# Patient Record
Sex: MALE | Race: WHITE | NOT HISPANIC OR LATINO | Employment: OTHER | URBAN - METROPOLITAN AREA
[De-identification: names, ages, dates, MRNs, and addresses within clinical notes are randomized per-mention and may not be internally consistent; named-entity substitution may affect disease eponyms.]

---

## 2018-03-22 ENCOUNTER — TRANSFERRED RECORDS (OUTPATIENT)
Dept: MULTI SPECIALTY CLINIC | Facility: CLINIC | Age: 63
End: 2018-03-22

## 2020-02-07 ENCOUNTER — OFFICE VISIT (OUTPATIENT)
Dept: FAMILY MEDICINE | Facility: CLINIC | Age: 65
End: 2020-02-07
Payer: COMMERCIAL

## 2020-02-07 ENCOUNTER — ANCILLARY PROCEDURE (OUTPATIENT)
Dept: GENERAL RADIOLOGY | Facility: CLINIC | Age: 65
End: 2020-02-07
Attending: PHYSICIAN ASSISTANT
Payer: COMMERCIAL

## 2020-02-07 VITALS
RESPIRATION RATE: 18 BRPM | DIASTOLIC BLOOD PRESSURE: 82 MMHG | HEART RATE: 64 BPM | TEMPERATURE: 97.2 F | BODY MASS INDEX: 26.09 KG/M2 | SYSTOLIC BLOOD PRESSURE: 128 MMHG | HEIGHT: 67 IN | WEIGHT: 166.2 LBS

## 2020-02-07 DIAGNOSIS — Z00.01 ENCOUNTER FOR ROUTINE ADULT HEALTH EXAMINATION WITH ABNORMAL FINDINGS: ICD-10-CM

## 2020-02-07 DIAGNOSIS — L30.8 OTHER ECZEMA: ICD-10-CM

## 2020-02-07 DIAGNOSIS — Z87.891 HISTORY OF TOBACCO ABUSE: ICD-10-CM

## 2020-02-07 DIAGNOSIS — Z23 NEED FOR VACCINATION: ICD-10-CM

## 2020-02-07 DIAGNOSIS — M25.512 CHRONIC LEFT SHOULDER PAIN: ICD-10-CM

## 2020-02-07 DIAGNOSIS — G89.29 CHRONIC LEFT SHOULDER PAIN: ICD-10-CM

## 2020-02-07 DIAGNOSIS — I25.10 CORONARY ARTERY DISEASE INVOLVING NATIVE CORONARY ARTERY OF NATIVE HEART WITHOUT ANGINA PECTORIS: ICD-10-CM

## 2020-02-07 DIAGNOSIS — Z12.11 SPECIAL SCREENING FOR MALIGNANT NEOPLASMS, COLON: Primary | ICD-10-CM

## 2020-02-07 PROBLEM — L57.0 ACTINIC KERATOSIS: Status: ACTIVE | Noted: 2020-02-07

## 2020-02-07 PROCEDURE — 90750 HZV VACC RECOMBINANT IM: CPT | Performed by: PHYSICIAN ASSISTANT

## 2020-02-07 PROCEDURE — 73030 X-RAY EXAM OF SHOULDER: CPT | Mod: LT

## 2020-02-07 PROCEDURE — 99387 INIT PM E/M NEW PAT 65+ YRS: CPT | Mod: 25 | Performed by: PHYSICIAN ASSISTANT

## 2020-02-07 PROCEDURE — 90471 IMMUNIZATION ADMIN: CPT | Performed by: PHYSICIAN ASSISTANT

## 2020-02-07 RX ORDER — ATORVASTATIN CALCIUM 40 MG/1
40 TABLET, FILM COATED ORAL
COMMUNITY
Start: 2016-07-01 | End: 2020-08-14

## 2020-02-07 RX ORDER — TRIAMCINOLONE ACETONIDE 1 MG/G
CREAM TOPICAL 2 TIMES DAILY
Qty: 85.2 G | Refills: 1 | Status: SHIPPED | OUTPATIENT
Start: 2020-02-07 | End: 2020-07-29

## 2020-02-07 RX ORDER — ASPIRIN 81 MG/1
81 TABLET, CHEWABLE ORAL
COMMUNITY
Start: 2015-06-13 | End: 2020-08-14

## 2020-02-07 ASSESSMENT — ENCOUNTER SYMPTOMS
HEADACHES: 0
SORE THROAT: 0
FEVER: 0
NERVOUS/ANXIOUS: 0
DIARRHEA: 0
PALPITATIONS: 0
DYSURIA: 0
MYALGIAS: 1
JOINT SWELLING: 0
HEMATOCHEZIA: 0
COUGH: 0
ABDOMINAL PAIN: 0
ARTHRALGIAS: 1
FREQUENCY: 0
EYE PAIN: 0
DIZZINESS: 0
HEARTBURN: 0
HEMATURIA: 0
PARESTHESIAS: 0
NAUSEA: 0
CHILLS: 0
CONSTIPATION: 0

## 2020-02-07 ASSESSMENT — ACTIVITIES OF DAILY LIVING (ADL): CURRENT_FUNCTION: NO ASSISTANCE NEEDED

## 2020-02-07 ASSESSMENT — MIFFLIN-ST. JEOR: SCORE: 1496.47

## 2020-02-07 NOTE — NURSING NOTE
"Chief Complaint   Patient presents with     Physical       Initial /82 (BP Location: Right arm, Patient Position: Sitting, Cuff Size: Adult Regular)   Pulse 64   Temp 97.2  F (36.2  C) (Tympanic)   Resp 18   Ht 1.708 m (5' 7.25\")   Wt 75.4 kg (166 lb 3.2 oz)   BMI 25.84 kg/m   Estimated body mass index is 25.84 kg/m  as calculated from the following:    Height as of this encounter: 1.708 m (5' 7.25\").    Weight as of this encounter: 75.4 kg (166 lb 3.2 oz).    Patient presents to the clinic using No DME    Health Maintenance that is potentially due pending provider review:  Colonoscopy/FIT    Gave pt phone number/pended order to schedule mammo and/or colonoscopy(or FIT)    Is there anyone who you would like to be able to receive your results? No  If yes have patient fill out RJ    Shari Chaney CMA    "

## 2020-02-07 NOTE — PROGRESS NOTES
"SUBJECTIVE:   Dillon Matias is a 65 year old male who presents for Preventive Visit.    Physical Health:    In general, how would you rate your overall physical health? good    Outside of work, how many days during the week do you exercise? 4-5 days/week    Outside of work, approximately how many minutes a day do you exercise?less than 15 minutes    If you drink alcohol do you typically have >3 drinks per day or >7 drinks per week? No    Do you usually eat at least 4 servings of fruit and vegetables a day, include whole grains & fiber and avoid regularly eating high fat or \"junk\" foods? NO    Do you have any problems taking medications regularly?  No    Do you have any side effects from medications? No    Needs assistance for the following daily activities: no assistance needed    Which of the following safety concerns are present in your home?  none identified     Hearing impairment: No    In the past 6 months, have you been bothered by leaking of urine? no    Mental Health:    In general, how would you rate your overall mental or emotional health? good  PHQ-2 Score: 0    Do you feel safe in your environment? Yes    Have you ever done Advance Care Planning? (For example, a Health Directive, POLST, or a discussion with a medical provider or your loved ones about your wishes): Yes, patient states has an Advance Care Planning document and will bring a copy to the clinic.    Additional concerns to address?  No    Fall risk:  Fallen 2 or more times in the past year?: No  Any fall with injury in the past year?: No     Cognitive Screenin) Repeat 3 items (Leader, Season, Table)   2) Clock draw: NORMAL  3) 3 item recall: Recalls 3 objects  Results: 3 items recalled: COGNITIVE IMPAIRMENT LESS LIKELY    Mini-CogTM Copyright SRIDHAR Mckenzie. Licensed by the author for use in [facility]; reprinted with permission (ean@.[facility]). All rights reserved.      Do you have sleep apnea, excessive snoring or daytime " drowsiness?: yes    Vascular Disease Follow-up - June 2015 with [facility]      How often do you take nitroglycerin? Never    Do you take an aspirin every day? Yes      Reviewed and updated as needed this visit by clinical staff  Tobacco  Allergies  Meds  Problems  Med Hx  Surg Hx  Fam Hx  Soc Hx          Reviewed and updated as needed this visit by Provider  Tobacco  Allergies  Meds  Problems  Med Hx  Surg Hx  Fam Hx        Social History     Tobacco Use     Smoking status: Former Smoker     Smokeless tobacco: Never Used   Substance Use Topics     Alcohol use: Not on file                           Current providers sharing in care for this patient include:   Transferring care to NB team  Patient Care Team:  Shree Saunders MD as PCP - General    The following health maintenance items are reviewed in Epic and correct as of today:  Health Maintenance   Topic Date Due     HEPATITIS C SCREENING  1955     COLONOSCOPY  01/21/1964     LIPID  01/21/1989     AORTIC ANEURYSM SCREENING (SYSTEM ASSIGNED)  01/21/2019     MEDICARE ANNUAL WELLNESS VISIT  02/07/2021     FALL RISK ASSESSMENT  02/07/2021     DTAP/TDAP/TD IMMUNIZATION (3 - Td) 03/18/2023     ADVANCE CARE PLANNING  02/07/2025     PHQ-2  Completed     INFLUENZA VACCINE  Completed     PNEUMOCOCCAL IMMUNIZATION 65+ LOW/MEDIUM RISK  Completed     ZOSTER IMMUNIZATION  Completed     IPV IMMUNIZATION  Aged Out     MENINGITIS IMMUNIZATION  Aged Out     Lab work is in process  Labs reviewed in EPIC  Patient Active Problem List   Diagnosis     Coronary artery disease involving native coronary artery of native heart without angina pectoris     Dyslipidemia     Actinic keratosis     Chronic left shoulder pain     History of tobacco abuse     Other eczema     History reviewed. No pertinent surgical history.    Social History     Tobacco Use     Smoking status: Former Smoker     Smokeless tobacco: Never Used   Substance Use Topics     Alcohol use: Not on file      "History reviewed. No pertinent family history.      Current Outpatient Medications   Medication Sig Dispense Refill     aspirin (ASA) 81 MG chewable tablet 81 mg by Nasogastric route       atorvastatin (LIPITOR) 40 MG tablet Take 40 mg by mouth       triamcinolone (KENALOG) 0.1 % external cream Apply topically 2 times daily 85.2 g 1     No Known Allergies  Pneumonia Vaccine: UTD  Shingles - 2nd IZ given today.     ROS:  Constitutional, HEENT, cardiovascular, pulmonary, gi and gu systems are negative, except as otherwise noted. Mild right shoulder pain    OBJECTIVE:   /82 (BP Location: Right arm, Patient Position: Sitting, Cuff Size: Adult Regular)   Pulse 64   Temp 97.2  F (36.2  C) (Tympanic)   Resp 18   Ht 1.708 m (5' 7.25\")   Wt 75.4 kg (166 lb 3.2 oz)   BMI 25.84 kg/m   Estimated body mass index is 25.84 kg/m  as calculated from the following:    Height as of this encounter: 1.708 m (5' 7.25\").    Weight as of this encounter: 75.4 kg (166 lb 3.2 oz).  EXAM:   GENERAL: healthy, alert and no distress  EYES: Eyes grossly normal to inspection, PERRL and conjunctivae and sclerae normal  HENT: Multiple rough scaly patches on the face consistent with actinic keratosis.  Ear canals and TM's normal, nose and mouth without ulcers or lesions  NECK: no adenopathy, no asymmetry, masses, or scars and thyroid normal to palpation  RESP: lungs clear to auscultation - no rales, rhonchi or wheezes  CV: regular rate and rhythm, normal S1 S2, no S3 or S4, no murmur, click or rub, no peripheral edema and peripheral pulses strong  ABDOMEN: soft, nontender, no hepatosplenomegaly, no masses and bowel sounds normal  MS: no gross musculoskeletal defects noted, no edema  MS: extremities normal- no gross deformities noted  SKIN: no suspicious lesions or rashes  NEURO: Normal strength and tone, mentation intact and speech normal  PSYCH: mentation appears normal, affect normal/bright     Diagnostic Test Results:  Labs reviewed in " "Epic      ASSESSMENT / PLAN:   (Z00.01) Encounter for routine adult health examination with abnormal findings  Comment: Overall very healthy. Few actinic keratoses on exam. Will schedule appt for cryotherapy. Updated screenings, see below  Plan:     (Z12.11) Special screening for malignant neoplasms, colon  (primary encounter diagnosis)  Comment: Thinks he had a colonoscopy in 2017, but isn't sure. Will bring in information if he can find it. If not, order for Colonoscopy placed.   Plan: GASTROENTEROLOGY ADULT REF PROCEDURE ONLY [facility] [phone]          (Z87.891) History of tobacco abuse  Comment: Pt with > 30 pack year history. AAA screening ordered  Plan: US Abdominal Aorta Imaging          (I25.10) Coronary artery disease involving native coronary artery of native heart without angina pectoris  Comment: Stable on medical management. No changes today.   Plan: Continue current regimen. Sees Cardiology yearly.     (L30.8) Other eczema  Comment: Chronic. Try triamcinolone  Plan: triamcinolone (KENALOG) 0.1 % external cream          (M25.512,  G89.29) Chronic left shoulder pain  Comment: Pain for several years. F/u for further evaluation. XR today to discuss at follow up  Plan: XR Shoulder Left G/E 3 Views          (Z23) Need for vaccination  Comment: Updated Singrix #1.   Plan: ZOSTER VACCINE RECOMBINANT ADJUVANTED IM NJX         COUNSELING:  Reviewed preventive health counseling, as reflected in patient instructions  Special attention given to:       Consider AAA screening for ages 65-75 and smoking history       Healthy diet/nutrition       Vision screening       Hearing screening       Immunizations    Vaccinated for: Zoster             Colon cancer screening    Estimated body mass index is 25.84 kg/m  as calculated from the following:    Height as of this encounter: 1.708 m (5' 7.25\").    Weight as of this encounter: 75.4 kg (166 lb 3.2 oz).       reports that he has quit smoking. He has " "never used smokeless tobacco.      Appropriate preventive services were discussed with this patient, including applicable screening as appropriate for cardiovascular disease, diabetes, osteopenia/osteoporosis, and glaucoma.  As appropriate for age/gender, discussed screening for colorectal cancer, prostate cancer, breast cancer, and cervical cancer. Checklist reviewing preventive services available has been given to the patient.    Reviewed patients plan of care and provided an AVS. The Basic Care Plan (routine screening as documented in Health Maintenance) for Dillon meets the Care Plan requirement. This Care Plan has been established and reviewed with the Patient.    Counseling Resources:  ATP IV Guidelines  Pooled Cohorts Equation Calculator  Breast Cancer Risk Calculator  FRAX Risk Assessment  ICSI Preventive Guidelines  Dietary Guidelines for Americans, 2010  [employer]'s MyPlate  ASA Prophylaxis  Lung CA Screening    Antonio Hernandez PA-C  [facility]  Answers for HPI/ROS submitted by the patient on 2/7/2020   Annual Exam:  In general, how would you rate your overall physical health?: good  Frequency of exercise:: 4-5 days/week  Do you usually eat at least 4 servings of fruit and vegetables a day, include whole grains & fiber, and avoid regularly eating high fat or \"junk\" foods? : No  Taking medications regularly:: Yes  Medication side effects:: None  Activities of Daily Living: no assistance needed  Home safety: no safety concerns identified  Hearing Impairment:: no hearing concerns  In the past 6 months, have you been bothered by leaking of urine?: No  abdominal pain: No  Blood in stool: No  Blood in urine: No  chest pain: No  chills: No  congestion: Yes  constipation: No  cough: No  diarrhea: No  dizziness: No  ear pain: No  eye pain: No  nervous/anxious: No  fever: No  frequency: No  genital sores: No  headaches: No  hearing loss: No  heartburn: No  arthralgias: Yes  joint swelling: " No  peripheral edema: No  mood changes: No  myalgias: Yes  nausea: No  dysuria: No  palpitations: No  Skin sensation changes: No  sore throat: No  urgency: No  In general, how would you rate your overall mental or emotional health?: excellent  Additional concerns today:: No  Duration of exercise:: Less than 15 minutes

## 2020-02-07 NOTE — PROGRESS NOTES
"SUBJECTIVE:   Dillon Matias is a 65 year old male who presents for Preventive Visit.    Are you in the first 12 months of your Medicare coverage?  No    Healthy Habits:     In general, how would you rate your overall health?  Good    Frequency of exercise:  4-5 days/week    Duration of exercise:  Less than 15 minutes    Do you usually eat at least 4 servings of fruit and vegetables a day, include whole grains    & fiber and avoid regularly eating high fat or \"junk\" foods?  No    Taking medications regularly:  Yes    Medication side effects:  None    Ability to successfully perform activities of daily living:  No assistance needed    Home Safety:  No safety concerns identified    Hearing Impairment:  No hearing concerns    In the past 6 months, have you been bothered by leaking of urine?  No    In general, how would you rate your overall mental or emotional health?  Excellent      PHQ-2 Total Score: 0    Additional concerns today:  No    Do you feel safe in your environment? Yes    Have you ever done Advance Care Planning? (For example, a Health Directive, POLST, or a discussion with a medical provider or your loved ones about your wishes): No, advance care planning information given to patient to review.  Advanced care planning was discussed at today's visit.    {Hearing Test Done (Optional):895331}  Fall risk  Fallen 2 or more times in the past year?: No  Any fall with injury in the past year?: No    Cognitive Screening   1) Repeat 3 items (Leader, Season, Table)    2) Clock draw: NORMAL  3) 3 item recall: Recalls 3 objects  Results: 3 items recalled: COGNITIVE IMPAIRMENT LESS LIKELY    Mini-CogTM Copyright SRIDHAR Mckenzie. Licensed by the author for use in [facility]; reprinted with permission (ean@.[facility]). All rights reserved.      {Do you have sleep apnea, excessive snoring or daytime drowsiness? (Optional):057978}    Reviewed and updated as needed this visit by clinical staff         Reviewed and " updated as needed this visit by Provider        Social History     Tobacco Use     Smoking status: Not on file   Substance Use Topics     Alcohol use: Not on file     If you drink alcohol do you typically have >3 drinks per day or >7 drinks per week? No    Alcohol Use 2/7/2020   Prescreen: >3 drinks/day or >7 drinks/week? No   No flowsheet data found.      Current providers sharing in care for this patient include:   Patient Care Team:  Shree Saunders MD as PCP - General    The following health maintenance items are reviewed in Epic and correct as of today:  Health Maintenance   Topic Date Due     HEPATITIS C SCREENING  1955     ADVANCE CARE PLANNING  1955     COLONOSCOPY  01/21/1964     DTAP/TDAP/TD IMMUNIZATION (1 - Tdap) 01/21/1965     LIPID  01/21/1989     ZOSTER IMMUNIZATION (1 of 2) 01/21/2004     MEDICARE ANNUAL WELLNESS VISIT  01/21/2019     FALL RISK ASSESSMENT  01/21/2019     PNEUMOCOCCAL IMMUNIZATION 65+ LOW/MEDIUM RISK (1 of 2 - PCV13) 01/21/2019     AORTIC ANEURYSM SCREENING (SYSTEM ASSIGNED)  01/21/2019     INFLUENZA VACCINE (1) 09/01/2019     PHQ-2  01/01/2020     IPV IMMUNIZATION  Aged Out     MENINGITIS IMMUNIZATION  Aged Out     {Chronicprobdata (optional):585892}  {Decision Support (Optional):503486}    Review of Systems   Constitutional: Negative for chills and fever.   HENT: Positive for congestion. Negative for ear pain, hearing loss and sore throat.    Eyes: Negative for pain.   Respiratory: Negative for cough.    Cardiovascular: Negative for chest pain, palpitations and peripheral edema.   Gastrointestinal: Negative for abdominal pain, constipation, diarrhea, heartburn, hematochezia and nausea.   Genitourinary: Negative for dysuria, frequency, genital sores, hematuria and urgency.   Musculoskeletal: Positive for arthralgias and myalgias. Negative for joint swelling.   Neurological: Negative for dizziness, headaches and paresthesias.   Psychiatric/Behavioral: Negative for mood  "changes. The patient is not nervous/anxious.      {ROS COMP (Optional):861357}    OBJECTIVE:   /82 (BP Location: Right arm, Patient Position: Sitting, Cuff Size: Adult Regular)   Pulse 64   Temp 97.2  F (36.2  C) (Tympanic)   Resp 18   Ht 1.708 m (5' 7.25\")   Wt 75.4 kg (166 lb 3.2 oz)   BMI 25.84 kg/m   There is no height or weight on file to calculate BMI.  Physical Exam  {Exam (Optional) :826222}    {Diagnostic Test Results (Optional):998446::\"Diagnostic Test Results:\",\"Labs reviewed in Epic\"}    ASSESSMENT / PLAN:   {Dia Picklist:622001}    COUNSELING:  {Medicare Counselin}    Estimated body mass index is 25.84 kg/m  as calculated from the following:    Height as of this encounter: 1.708 m (5' 7.25\").    Weight as of this encounter: 75.4 kg (166 lb 3.2 oz).    {Weight Management Plan (ACO) Complete if BMI is abnormal-  Ages 18-64  BMI >24.9.  Age 65+ with BMI <23 or >30 (Optional):791802}     has no history on file for tobacco.  {Tobacco Cessation -- Complete if patient is a smoker (Optional):781993}    Appropriate preventive services were discussed with this patient, including applicable screening as appropriate for cardiovascular disease, diabetes, osteopenia/osteoporosis, and glaucoma.  As appropriate for age/gender, discussed screening for colorectal cancer, prostate cancer, breast cancer, and cervical cancer. Checklist reviewing preventive services available has been given to the patient.    Reviewed patients plan of care and provided an AVS. The {CarePlan:891231} for Dillon meets the Care Plan requirement. This Care Plan has been established and reviewed with the {PATIENT, FAMILY MEMBER, CAREGIVER:613284}.    Counseling Resources:  ATP IV Guidelines  Pooled Cohorts Equation Calculator  Breast Cancer Risk Calculator  FRAX Risk Assessment  ICSI Preventive Guidelines  Dietary Guidelines for Americans,   USDA's MyPlate  ASA Prophylaxis  Lung CA Screening    Antonio Hernandez, " TAMEKA  [facility]    Identified Health Risks:

## 2020-02-10 ENCOUNTER — OFFICE VISIT (OUTPATIENT)
Dept: FAMILY MEDICINE | Facility: CLINIC | Age: 65
End: 2020-02-10
Payer: COMMERCIAL

## 2020-02-10 VITALS
HEART RATE: 62 BPM | RESPIRATION RATE: 16 BRPM | SYSTOLIC BLOOD PRESSURE: 120 MMHG | DIASTOLIC BLOOD PRESSURE: 78 MMHG | TEMPERATURE: 97.6 F | WEIGHT: 166 LBS | HEIGHT: 67 IN | BODY MASS INDEX: 26.06 KG/M2

## 2020-02-10 DIAGNOSIS — L57.0 ACTINIC KERATOSIS: Primary | ICD-10-CM

## 2020-02-10 PROCEDURE — 17003 DESTRUCT PREMALG LES 2-14: CPT | Performed by: PHYSICIAN ASSISTANT

## 2020-02-10 PROCEDURE — 17000 DESTRUCT PREMALG LESION: CPT | Performed by: PHYSICIAN ASSISTANT

## 2020-02-10 ASSESSMENT — MIFFLIN-ST. JEOR: SCORE: 1495.56

## 2020-02-10 NOTE — NURSING NOTE
"Chief Complaint   Patient presents with     Derm Problem       Initial /78   Pulse 62   Temp 97.6  F (36.4  C) (Tympanic)   Resp 16   Ht 1.708 m (5' 7.25\")   Wt 75.3 kg (166 lb)   BMI 25.81 kg/m   Estimated body mass index is 25.81 kg/m  as calculated from the following:    Height as of this encounter: 1.708 m (5' 7.25\").    Weight as of this encounter: 75.3 kg (166 lb).    Patient presents to the clinic using No DME    Health Maintenance that is potentially due pending provider review:  NONE    n/a    Is there anyone who you would like to be able to receive your results? Yes  If yes have patient fill out RJ    "

## 2020-02-10 NOTE — PROGRESS NOTES
"Subjective     Dillon Matias is a 65 year old male who presents to clinic today for the following health issues:    HPI   Facial and scalp      Duration: months to years    Description (location/character/radiation): raised dry    Intensity:  mild    Accompanying signs and symptoms: none    History (similar episodes/previous evaluation): yes    Precipitating or alleviating factors:     Therapies tried and outcome: has had cryo in the past      Patient Active Problem List   Diagnosis     Coronary artery disease involving native coronary artery of native heart without angina pectoris     Dyslipidemia     Actinic keratosis     Chronic left shoulder pain     History of tobacco abuse     Other eczema     No past surgical history on file.    Social History     Tobacco Use     Smoking status: Former Smoker     Smokeless tobacco: Never Used   Substance Use Topics     Alcohol use: Not on file     No family history on file.      Current Outpatient Medications   Medication Sig Dispense Refill     aspirin (ASA) 81 MG chewable tablet 81 mg by Nasogastric route       atorvastatin (LIPITOR) 40 MG tablet Take 40 mg by mouth       triamcinolone (KENALOG) 0.1 % external cream Apply topically 2 times daily 85.2 g 1     No Known Allergies    Reviewed and updated as needed this visit by Provider         Review of Systems   ROS otherwise negative than what was stated in the HPI.       Objective    /78   Pulse 62   Temp 97.6  F (36.4  C) (Tympanic)   Resp 16   Ht 1.708 m (5' 7.25\")   Wt 75.3 kg (166 lb)   BMI 25.81 kg/m    Body mass index is 25.81 kg/m .  Physical Exam   GENERAL: healthy, alert and no distress  EYES: Eyes grossly normal to inspection, PERRL and conjunctivae and sclerae normal  NECK: no adenopathy, no asymmetry, masses, or scars and thyroid normal to palpation  RESP: No resp distress  SKIN: A total of 8 actinic keratoses on the scalp face and neck were applied cryotherapy in 3 freeze/thaw cycles.    Diagnostic " Test Results:  none         Assessment & Plan     1. Actinic keratosis  8 actinic keratoses were applied cryotherapy on the face, head and neck.  Patient tolerated the procedure well.  Return to clinic as needed for further cryotherapy.  - DESTRUCT BENIGN LESION, UP TO 14     No follow-ups on file.    Antonio Hernandez PA-C  [facility]

## 2020-02-22 ENCOUNTER — ANCILLARY PROCEDURE (OUTPATIENT)
Dept: GENERAL RADIOLOGY | Facility: CLINIC | Age: 65
End: 2020-02-22
Attending: NURSE PRACTITIONER
Payer: COMMERCIAL

## 2020-02-22 ENCOUNTER — OFFICE VISIT (OUTPATIENT)
Dept: URGENT CARE | Facility: URGENT CARE | Age: 65
End: 2020-02-22
Payer: COMMERCIAL

## 2020-02-22 VITALS
OXYGEN SATURATION: 98 % | WEIGHT: 168 LBS | SYSTOLIC BLOOD PRESSURE: 128 MMHG | HEIGHT: 67 IN | HEART RATE: 79 BPM | TEMPERATURE: 100.7 F | RESPIRATION RATE: 16 BRPM | DIASTOLIC BLOOD PRESSURE: 76 MMHG | BODY MASS INDEX: 26.37 KG/M2

## 2020-02-22 DIAGNOSIS — B34.9 VIRAL SYNDROME: Primary | ICD-10-CM

## 2020-02-22 DIAGNOSIS — R05.9 COUGH: ICD-10-CM

## 2020-02-22 DIAGNOSIS — R50.9 FEVER, UNSPECIFIED FEVER CAUSE: ICD-10-CM

## 2020-02-22 LAB
FLUAV+FLUBV AG SPEC QL: NEGATIVE
FLUAV+FLUBV AG SPEC QL: NEGATIVE
SPECIMEN SOURCE: NORMAL

## 2020-02-22 PROCEDURE — 99213 OFFICE O/P EST LOW 20 MIN: CPT | Performed by: NURSE PRACTITIONER

## 2020-02-22 PROCEDURE — 87804 INFLUENZA ASSAY W/OPTIC: CPT | Performed by: NURSE PRACTITIONER

## 2020-02-22 PROCEDURE — 71046 X-RAY EXAM CHEST 2 VIEWS: CPT

## 2020-02-22 ASSESSMENT — MIFFLIN-ST. JEOR: SCORE: 1504.63

## 2020-02-22 NOTE — PROGRESS NOTES
"Subjective     Dillon Matias is a 65 year old male who presents to clinic today for the following health issues:    HPI     Chief Complaint   Patient presents with     Flu     Yesterday afternoon fever, chills, nasal congestion, cough, body aches, headache. Taking Nyquil.         Patient Active Problem List   Diagnosis     Coronary artery disease involving native coronary artery of native heart without angina pectoris     Dyslipidemia     Actinic keratosis     Chronic left shoulder pain     History of tobacco abuse     Other eczema     History reviewed. No pertinent surgical history.    Social History     Tobacco Use     Smoking status: Former Smoker     Smokeless tobacco: Never Used   Substance Use Topics     Alcohol use: Not on file     History reviewed. No pertinent family history.      Current Outpatient Medications   Medication Sig Dispense Refill     aspirin (ASA) 81 MG chewable tablet 81 mg by Nasogastric route       atorvastatin (LIPITOR) 40 MG tablet Take 40 mg by mouth       triamcinolone (KENALOG) 0.1 % external cream Apply topically 2 times daily 85.2 g 1     No Known Allergies      Reviewed and updated as needed this visit by Provider  Tobacco  Allergies  Meds  Problems  Med Hx  Surg Hx  Fam Hx         Review of Systems   ROS COMP: Constitutional, HEENT, cardiovascular, pulmonary, GI, , musculoskeletal, neuro, skin, endocrine and psych systems are negative, except as otherwise noted.      Objective    /76 (BP Location: Right arm, Patient Position: Sitting, Cuff Size: Adult Regular)   Pulse 79   Temp 100.7  F (38.2  C) (Tympanic)   Resp 16   Ht 1.708 m (5' 7.25\")   Wt 76.2 kg (168 lb)   SpO2 98%   BMI 26.12 kg/m    Body mass index is 26.12 kg/m .  Physical Exam   GENERAL: healthy, alert and no distress, nontoxic in appearance  EYES: Eyes grossly normal to inspection, PERRL and conjunctivae and sclerae normal  HENT: ear canals and TM's normal, nose and mouth without ulcers or " lesions  NECK: no adenopathy, supple with full ROM  RESP: lungs clear to auscultation - no rales, rhonchi or wheezes  CV: regular rate and rhythm, normal S1 S2, no S3 or S4, no murmur, click or rub, no peripheral edema   ABDOMEN: soft, nontender, no hepatosplenomegaly, no masses and bowel sounds normal  MS: no gross musculoskeletal defects noted, no edema  No rash    Diagnostic Test Results: I believe cxr is clear but will await over read.    CHEST TWO VIEWS 2/22/2020 1:42 PM      HISTORY: Fever, unspecified fever cause. Cough.     COMPARISON: None.                                                                       IMPRESSION: There are no acute infiltrates. The cardiac silhouette is  not enlarged. Pulmonary vasculature is unremarkable.    Labs reviewed in Epic  Results for orders placed or performed in visit on 02/22/20 (from the past 24 hour(s))   Influenza A/B antigen   Result Value Ref Range    Influenza A/B Agn Specimen Nasopharyngeal     Influenza A Negative NEG^Negative    Influenza B Negative NEG^Negative           Assessment & Plan   Problem List Items Addressed This Visit     None      Visit Diagnoses     Viral syndrome    -  Primary    Fever, unspecified fever cause        Relevant Orders    Influenza A/B antigen (Completed)    XR Chest 2 Views (Completed)    Cough        Relevant Orders    XR Chest 2 Views (Completed)               Patient Instructions   Increase rest and fluids. Tylenol and/or Ibuprofen for comfort. Cool mist vaporizer. If your symptoms worsen or do not resolve follow up with your primary care provider in 1 week and sooner if needed.      Mucinex 600 mg 12 hour formula for ear, head and chest congestion.  It can also thin post nasal drip which can cause a cough and sore throat.    Indications for emergent return to emergency department discussed with patient, who verbalized good understanding and agreement.  Patient understands the limitations of today's evaluation.           Patient  "Education     Viral Syndrome (Adult)  A viral illness may cause a number of symptoms such as fever. Other symptoms depend on the part of the body that the virus affects. If it settles in your nose, throat, and lungs, it may cause cough, sore throat, congestion, runny nose, headache, earache and other ear symptoms, or shortness of breath. If it settles in your stomach and intestinal tract, it may cause nausea, vomiting, cramping, and diarrhea. Sometimes it causes generalized symptoms like \"aching all over,\" feeling tired, loss of energy, or loss of appetite.  A viral illness usually lasts anywhere from several days to several weeks, but sometimes it lasts longer. In some cases, a more serious infection can look like a viral syndrome in the first few days of the illness. You may need another exam and additional tests to know the difference. Watch for the warning signs listed below for when to seek medical advice.  Home care  Follow these guidelines for taking care of yourself at home:    If symptoms are severe, rest at home for the first 2 to 3 days.    Stay away from cigarette smoke - both your smoke and the smoke from others.    You may use over-the-counter acetaminophen or ibuprofen for fever, muscle aching, and headache, unless another medicine was prescribed for this. If you have chronic liver or kidney disease or ever had a stomach ulcer or gastrointestinal bleeding, talk with your healthcare provider before using these medicines. No one who is younger than 18 and ill with a fever should take aspirin. It may cause severe disease or death.    Your appetite may be poor, so a light diet is fine. Avoid dehydration by drinking 8 to 12, 8-ounce glasses of fluids each day. This may include water; orange juice; lemonade; apple, grape, and cranberry juice; clear fruit drinks; electrolyte replacement and sports drinks; and decaffeinated teas and coffee. If you have been diagnosed with a kidney disease, ask your healthcare " provider how much and what types of fluids you should drink to prevent dehydration. If you have kidney disease, drinking too much fluid can cause it build up in the your body and be dangerous to your health.    Over-the-counter remedies won't shorten the length of the illness but may be helpful for symptoms such as cough, sore throat, nasal and sinus congestion, or diarrhea. Don't use decongestants if you have high blood pressure.  Follow-up care  Follow up with your healthcare provider if you do not improve over the next week.  Call 911  Call 911 if any of the following occur:    Convulsion    Feeling weak, dizzy, or like you are going to faint    Chest pain, or more than mild shortness of breath  When to seek medical advice  Call your healthcare provider right away if any of these occur:    Cough with lots of colored sputum (mucus) or blood in your sputum    Chest pain, shortness of breath, wheezing, or trouble breathing    Severe headache; face, neck, or ear pain    Severe, constant pain in the lower right side of your belly (abdominal)    Continued vomiting (can t keep liquids down)    Frequent diarrhea (more than 5 times a day); blood (red or black color) or mucus in diarrhea    Feeling weak, dizzy, or like you are going to faint    Extreme thirst    Fever of 100.4 F (38 C) or higher, or as directed by your healthcare provider  Date Last Reviewed: 4/1/2018 2000-2019 [employer]. [address] [place] [id number]. All rights reserved. This information is not intended as a substitute for professional medical care. Always follow your healthcare professional's instructions.             Return in about 1 week (around 2/29/2020) for Follow up with your primary care provider.    SRIRAM Chung [facility]

## 2020-02-22 NOTE — PATIENT INSTRUCTIONS
"Increase rest and fluids. Tylenol and/or Ibuprofen for comfort. Cool mist vaporizer. If your symptoms worsen or do not resolve follow up with your primary care provider in 1 week and sooner if needed.      Mucinex 600 mg 12 hour formula for ear, head and chest congestion.  It can also thin post nasal drip which can cause a cough and sore throat.    Indications for emergent return to emergency department discussed with patient, who verbalized good understanding and agreement.  Patient understands the limitations of today's evaluation.           Patient Education     Viral Syndrome (Adult)  A viral illness may cause a number of symptoms such as fever. Other symptoms depend on the part of the body that the virus affects. If it settles in your nose, throat, and lungs, it may cause cough, sore throat, congestion, runny nose, headache, earache and other ear symptoms, or shortness of breath. If it settles in your stomach and intestinal tract, it may cause nausea, vomiting, cramping, and diarrhea. Sometimes it causes generalized symptoms like \"aching all over,\" feeling tired, loss of energy, or loss of appetite.  A viral illness usually lasts anywhere from several days to several weeks, but sometimes it lasts longer. In some cases, a more serious infection can look like a viral syndrome in the first few days of the illness. You may need another exam and additional tests to know the difference. Watch for the warning signs listed below for when to seek medical advice.  Home care  Follow these guidelines for taking care of yourself at home:    If symptoms are severe, rest at home for the first 2 to 3 days.    Stay away from cigarette smoke - both your smoke and the smoke from others.    You may use over-the-counter acetaminophen or ibuprofen for fever, muscle aching, and headache, unless another medicine was prescribed for this. If you have chronic liver or kidney disease or ever had a stomach ulcer or gastrointestinal " bleeding, talk with your healthcare provider before using these medicines. No one who is younger than 18 and ill with a fever should take aspirin. It may cause severe disease or death.    Your appetite may be poor, so a light diet is fine. Avoid dehydration by drinking 8 to 12, 8-ounce glasses of fluids each day. This may include water; orange juice; lemonade; apple, grape, and cranberry juice; clear fruit drinks; electrolyte replacement and sports drinks; and decaffeinated teas and coffee. If you have been diagnosed with a kidney disease, ask your healthcare provider how much and what types of fluids you should drink to prevent dehydration. If you have kidney disease, drinking too much fluid can cause it build up in the your body and be dangerous to your health.    Over-the-counter remedies won't shorten the length of the illness but may be helpful for symptoms such as cough, sore throat, nasal and sinus congestion, or diarrhea. Don't use decongestants if you have high blood pressure.  Follow-up care  Follow up with your healthcare provider if you do not improve over the next week.  Call 911  Call 911 if any of the following occur:    Convulsion    Feeling weak, dizzy, or like you are going to faint    Chest pain, or more than mild shortness of breath  When to seek medical advice  Call your healthcare provider right away if any of these occur:    Cough with lots of colored sputum (mucus) or blood in your sputum    Chest pain, shortness of breath, wheezing, or trouble breathing    Severe headache; face, neck, or ear pain    Severe, constant pain in the lower right side of your belly (abdominal)    Continued vomiting (can t keep liquids down)    Frequent diarrhea (more than 5 times a day); blood (red or black color) or mucus in diarrhea    Feeling weak, dizzy, or like you are going to faint    Extreme thirst    Fever of 100.4 F (38 C) or higher, or as directed by your healthcare provider  Date Last Reviewed:  4/1/2018 2000-2019 [employer]. [address] [place] [address], [place] [address]. All rights reserved. This information is not intended as a substitute for professional medical care. Always follow your healthcare professional's instructions.

## 2020-02-27 ENCOUNTER — TELEPHONE (OUTPATIENT)
Dept: FAMILY MEDICINE | Facility: CLINIC | Age: 65
End: 2020-02-27

## 2020-02-27 NOTE — TELEPHONE ENCOUNTER
Spoke with Dillon, his flu like symptoms are gone but this cough is hanging on. Advised the cough can hang on for 2-3 weeks, cough should slowly improve, if gets worse, come back in

## 2020-02-27 NOTE — TELEPHONE ENCOUNTER
Reason for call:  Patient reporting a symptom    Symptom or request: Dillon was seen in  on Saturday with fever and aches. He tested negative for flu and CxR was negative. He says he is living on cough drops and decongestants.  His fever is down but now the congestion is going into his throat and is coughing a milky phlegm. Voice is hoarse. He is wondering what we advise and if he needs to be seen again.   Have you been treated for this before? Yes        Phone Number patient can be reached at:  Work number [phone]  Best Time:  anytime    Can we leave a detailed message on this number:  YES    Call taken on 2/27/2020 at 8:50 AM by Ivanna Whittaker

## 2020-07-29 ENCOUNTER — OFFICE VISIT (OUTPATIENT)
Dept: FAMILY MEDICINE | Facility: CLINIC | Age: 65
End: 2020-07-29
Payer: COMMERCIAL

## 2020-07-29 VITALS
TEMPERATURE: 97 F | HEART RATE: 60 BPM | WEIGHT: 166 LBS | RESPIRATION RATE: 18 BRPM | OXYGEN SATURATION: 96 % | DIASTOLIC BLOOD PRESSURE: 80 MMHG | SYSTOLIC BLOOD PRESSURE: 134 MMHG | BODY MASS INDEX: 25.81 KG/M2

## 2020-07-29 DIAGNOSIS — M72.2 PLANTAR FASCIITIS: ICD-10-CM

## 2020-07-29 DIAGNOSIS — L57.0 ACTINIC KERATOSIS: Primary | ICD-10-CM

## 2020-07-29 PROCEDURE — 99214 OFFICE O/P EST MOD 30 MIN: CPT | Performed by: PHYSICIAN ASSISTANT

## 2020-07-29 RX ORDER — IMIQUIMOD 12.5 MG/.25G
CREAM TOPICAL
Qty: 8 PACKET | Refills: 3 | Status: SHIPPED | OUTPATIENT
Start: 2020-07-29 | End: 2022-03-02

## 2020-07-29 NOTE — PROGRESS NOTES
Subjective     Dillon Matias is a 65 year old male who presents to clinic today for the following health issues:    HPI   Musculoskeletal problem/pain    Duration: 2 weeks     Description  Location: right heel     Intensity:  Varies      Accompanying signs and symptoms: none    History  Previous similar problem: no   Previous evaluation:  none    Precipitating or alleviating factors:  Trauma or overuse: no   Aggravating factors include: inactivity     Therapies tried and outcome: Ibuprofen    Worse with first step in the morning. Improves throughout the day.     Derm     Duration: Years    Description (location/character/radiation): Patient states the lesion that was frozen has returned. Says he used to see a dermatologist annually and wonders if he should get another referral to see a dermatologist     Intensity:  mild    Accompanying signs and symptoms: none    History (similar episodes/previous evaluation): None    Precipitating or alleviating factors: None    Therapies tried and outcome: Nothing since he was seen last      Patient Active Problem List   Diagnosis     Coronary artery disease involving native coronary artery of native heart without angina pectoris     Dyslipidemia     Actinic keratosis     Chronic left shoulder pain     History of tobacco abuse     Other eczema     History reviewed. No pertinent surgical history.    Social History     Tobacco Use     Smoking status: Former Smoker     Smokeless tobacco: Never Used   Substance Use Topics     Alcohol use: Not on file     History reviewed. No pertinent family history.      Current Outpatient Medications   Medication Sig Dispense Refill     aspirin (ASA) 81 MG chewable tablet 81 mg by Nasogastric route       atorvastatin (LIPITOR) 40 MG tablet Take 40 mg by mouth       imiquimod (ALDARA) 5 % external cream Apply topically twice weekly at bedtime to face or scalp (but not both) and wash off after 8 hours. May use for up to 16 weeks. 8 packet 3     No  Known Allergies    Reviewed and updated as needed this visit by Provider  Tobacco  Allergies  Meds  Problems  Med Hx  Surg Hx  Fam Hx       Review of Systems   Constitutional, HEENT, cardiovascular, pulmonary, gi and gu systems are negative, except as otherwise noted.      Objective    /80 (BP Location: Right arm, Patient Position: Sitting, Cuff Size: Adult Regular)   Pulse 60   Temp 97  F (36.1  C) (Tympanic)   Resp 18   Wt 75.3 kg (166 lb)   SpO2 96%   BMI 25.81 kg/m    Body mass index is 25.81 kg/m .  Physical Exam   Constitutional: healthy, alert, and no distress  Head: Normocephalic. Atraumatic  Eyes: No conjunctival injection, sclera anicteric  Respiratory: No resp distress.  Musculoskeletal: extremities normal- no gross deformities noted, and normal muscle tone. R foot is non-tender without swelling. FROM of the ankle.   Neurologic: Gait normal. CN 2-12 grossly intact  Psychiatric: mentation appears normal and affect normal/bright   Skin: There are several SKs and AKs on the scalp and face.     Diagnostic Test Results:  none       Assessment & Plan   (L57.0) Actinic keratosis  (primary encounter diagnosis)  Comment: Pt had concerns about SK's on the scalp. Discussed that these are non-cancerous lesions. However, he does have more AKs as well. Rx or Aldara cream and referral to Derm per his request.   Plan: imiquimod (ALDARA) 5 % external cream,         DERMATOLOGY REFERRAL          (M72.2) Plantar fasciitis  Comment: Pt with R heel pain that is worse at night and with the first steps in the morning. This is mostly consistent with plantar fasciitis. Will treat with NSIADs, rest, ice and home exercises. Recommended nighttime bracing if symptoms persist. RETURN TO CLINIC in 4-6 weeks if symptoms no improved.   Plan: Conservative measures at home as above.     Return in about 4 weeks (around 8/26/2020), or if symptoms worsen or fail to improve, for In-Clinic Visit.    Antonio Hernandez,  TAMEKA  [facility]

## 2020-07-29 NOTE — PATIENT INSTRUCTIONS
You can use the cream for the rough spots on your face and scalp. The brown spots are just age spots and will continue to pop up and grow over time. There spots are not cancerous.     You also have plantar fasciitis. Use the exercises I gave you today. Use Ibuprofen 600 mg after work and strenuous activities, or even before bed. You can do this for two weeks and be safe.     I also recommend icing your foot with a frozen water bottle after strenuous activities to help decreased inflammation.     Use the nighttime bracing for your foot if you want. I think this is a little overkill for now, but its up to you.     Patient Education     Understanding Plantar Fasciitis    Plantar fasciitis is a condition that causes foot and heel pain. The plantar fascia is a tough band of tissue that runs across the bottom of the foot from the heel to the toes. This tissue pulls on the heel bone. It supports the arch of the foot as it pushes off the ground. If the tissue becomes irritated or red and swollen (inflamed), it is called plantar fasciitis.  How to say it  PLAN-tuhr [id number]   What causes plantar fasciitis?  Plantar fasciitis most often occurs from overusing the plantar fascia. The tissue may become damaged from activities that put repeated stress on the heel and foot. Or it may wear down over time with age and ankle stiffness. You are more likely to have plantar fasciitis if you:    Do activities that require a lot of running, jumping, or dancing    Have a job that requires being on your feet for long periods    Are overweight or obese    Have certain foot problems, such as a tight Achilles tendon, flat feet, or high arches    Often wear poorly fitting shoes  Symptoms of plantar fasciitis  The condition most often causes pain in the heel and the bottom of the foot. The pain may occur when you take your first steps in the morning. It may get better as you walk throughout the day. But as you continue to put weight on the  foot, the pain often returns. Pain may also occur after standing or sitting for long periods.  Treating plantar fasciitis  Treatments for plantar fasciitis include:    Resting the foot. This involves limiting movements that make your foot hurt. You may also need to avoid certain sports and types of work for a time.    Using cold packs. Put an ice pack on the heel and foot to help reduce pain and swelling.    Taking pain medicines. Prescription and over-the-counter pain medicines can help relieve pain and swelling.    Using heel cups or foot inserts (orthotics). These are placed in the shoes to help support the heel or arch and cushion the heel. You may also be told to buy proper-fitting shoes with good arch support and cushioned soles.    Taping the foot. This supports the arch and limits the movement of the plantar fascia to help relieve symptoms.    Wearing a night splint. This stretches the plantar fascia and leg muscles while you sleep. This may help relieve pain.    Doing exercises and physical therapy. These stretch and strengthen the plantar fascia and the muscles in the leg that support the heel and foot.    Getting shots of medicine into the foot. These may help relieve symptoms for a time.    Having surgery. This may be needed if other treatments fail to relieve symptoms. During surgery, the surgeon may partially cut the plantar fascia to release tension.  Possible complications of plantar fasciitis  Without proper care and treatment, healing may take longer than normal. Also, symptoms may continue or get worse. Over time, the plantar fascia may be damaged. This can make it hard to walk or even stand without pain.  When to call your healthcare provider  Call your healthcare provider right away if you have any of these:    Fever of 100.4 F (38 C) or higher, or as directed    Symptoms that don t get better with treatment, or get worse    New symptoms, such as numbness, tingling, or weakness in the foot    Date Last Reviewed: 3/10/2016    4976-8640 [employer]. [address] [place] [address], [place] [address]. All rights reserved. This information is not intended as a substitute for professional medical care. Always follow your healthcare professional's instructions.

## 2020-08-14 DIAGNOSIS — I25.10 CORONARY ARTERY DISEASE INVOLVING NATIVE CORONARY ARTERY OF NATIVE HEART WITHOUT ANGINA PECTORIS: Primary | ICD-10-CM

## 2020-08-18 RX ORDER — ATORVASTATIN CALCIUM 40 MG/1
40 TABLET, FILM COATED ORAL DAILY
Qty: 90 TABLET | Refills: 3 | Status: SHIPPED | OUTPATIENT
Start: 2020-08-18 | End: 2021-07-28

## 2020-08-18 RX ORDER — ASPIRIN 81 MG/1
81 TABLET, CHEWABLE ORAL DAILY
Qty: 90 TABLET | Refills: 3 | Status: SHIPPED | OUTPATIENT
Start: 2020-08-18

## 2020-08-18 NOTE — TELEPHONE ENCOUNTER
Routing refill requests to provider for review/approval because:  Medications are reported/historical    Conchita WRIGHT RN, BSN

## 2020-08-27 ENCOUNTER — OFFICE VISIT (OUTPATIENT)
Dept: DERMATOLOGY | Facility: CLINIC | Age: 65
End: 2020-08-27
Attending: PHYSICIAN ASSISTANT
Payer: COMMERCIAL

## 2020-08-27 VITALS — SYSTOLIC BLOOD PRESSURE: 128 MMHG | RESPIRATION RATE: 20 BRPM | DIASTOLIC BLOOD PRESSURE: 75 MMHG | HEART RATE: 69 BPM

## 2020-08-27 DIAGNOSIS — L81.4 LENTIGO: ICD-10-CM

## 2020-08-27 DIAGNOSIS — D22.9 NEVUS: ICD-10-CM

## 2020-08-27 DIAGNOSIS — L82.1 SEBORRHEIC KERATOSIS: ICD-10-CM

## 2020-08-27 DIAGNOSIS — D18.01 ANGIOMA OF SKIN: ICD-10-CM

## 2020-08-27 DIAGNOSIS — L57.0 ACTINIC KERATOSIS: Primary | ICD-10-CM

## 2020-08-27 PROCEDURE — 99214 OFFICE O/P EST MOD 30 MIN: CPT | Mod: 25 | Performed by: PHYSICIAN ASSISTANT

## 2020-08-27 PROCEDURE — 17000 DESTRUCT PREMALG LESION: CPT | Performed by: PHYSICIAN ASSISTANT

## 2020-08-27 RX ORDER — FLUOROURACIL 50 MG/G
CREAM TOPICAL
Qty: 40 G | Refills: 3 | Status: SHIPPED | OUTPATIENT
Start: 2020-08-27 | End: 2023-01-20

## 2020-08-27 NOTE — PROGRESS NOTES
HPI:   Chief complaints: Dillon Matias is a 65 year old male who presents for Full skin cancer screening to rule out skin cancer   Last Skin Exam: 2018 1st Baseline: no  Personal HX of Skin Cancer: no but has had AKs in the past  Personal HX of Malignant Melanoma:no   Family HX of Skin Cancer / Malignant Melanoma:   Personal HX of Atypical Moles:   no  Risk factors: history of frequent sun exposure and burns in the past with limited sunscreen use  New / Changing lesions:yes scaly spots on the face  Social History: lives in [place]; putting new dirt in yard (very large yard)  On review of systems, there are no further skin complaints, patient is feeling otherwise well.  See patient intake sheet.  ROS of the following were done and are negative: Constitutional, Eyes, Ears, Nose,   Mouth, Throat, Cardiovascular, Respiratory, GI, Genitourinary, Musculoskeletal,   Psychiatric, Endocrine, Allergic/Immunologic.    PHYSICAL EXAM:   /75 (BP Location: Right arm, Patient Position: Sitting, Cuff Size: Adult Regular)   Pulse 69   Resp 20   Skin exam performed as follows: Type 2 skin. Mood appropriate  Alert and Oriented X 3. Well developed, well nourished in no distress.  General appearance: Normal  Head including face: Normal  Eyes: conjunctiva and lids: Normal  Mouth: Lips, teeth, gums: Normal  Neck: Normal  Chest-breast/axillae: Normal  Back: Normal  Spleen and liver: Normal  Cardiovascular: Exam of peripheral vascular system by observation for swelling, varicosities, edema: Normal  Genitalia: groin, buttocks: Normal  Extremities: digits/nails (clubbing): Normal  Eccrine and Apocrine glands: Normal  Right upper extremity: Normal  Left upper extremity: Normal  Right lower extremity: Normal  Left lower extremity: Normal  Skin: Scalp and body hair: See below    Pt deferred exam of breasts, groin, buttocks: No    Other physical findings:  1. Multiple pigmented macules on extremities and trunk  2. Multiple  pigmented macules on face, trunk and extremities  3. Multiple vascular papules on trunk, arms and legs  4. Multiple scattered keratotic plaques  5. Pink gritty papul minerva the left helix x 1  6. Numerous pink gritty papules on the face       Except as noted above, no other signs of skin cancer or melanoma.     ASSESSMENT/PLAN:   Benign Full skin cancer screening today. . Patient with history of AKs  Advised on monthly self exams and 1 year  Patient Education: Appropriate brochures given.    1. Multiple benign appearing nevi on arms, legs and trunk. Discussed ABCDEs of melanoma and sunscreen.   2. Multiple lentigos on arms, legs and trunk. Advised benign, no treatment needed.  3. Multiple scattered angiomas. Advised benign, no treatment needed.   4. Seborrheic keratosis on arms, legs and trunk. Advised benign, no treatment needed.  5. Actinic keratosis on the left helix x 1. As precancerous, cryosurgery performed. Advised on blistering and post-op care. Advised if not resolved in 1-2 months to return for evaluation  6. Numerous AKs on the face - he has done Efudex and PDT in the past. Had an aggressive reaction to PDT and does not want to redo this. Start efudex BID x 2 weeks.               Follow-up: yearly FSE/PRN sooner    1.) Patient was asked about new and changing moles. YES  2.) Patient received a complete physical skin examination: YES  3.) Patient was counseled to perform a monthly self skin examination: YES  Scribed By: Eduarda Gaytan, MS, PACASANDRA

## 2020-08-27 NOTE — LETTER
"    8/27/2020         RE: Dillon Matias  [address] [address] [id number]        Dear Colleague,    Thank you for referring your patient, Dillon Matias, to the [facility]. Please see a copy of my visit note below.    Initial /75 (BP Location: Right arm, Patient Position: Sitting, Cuff Size: Adult Regular)   Pulse 69   Resp 20  Estimated body mass index is 25.81 kg/m  as calculated from the following:    Height as of 2/22/20: 1.708 m (5' 7.25\").    Weight as of 7/29/20: 75.3 kg (166 lb). .    Javy OVERTON RN   Specialty Clinics     HPI:   Chief complaints: Dillon Matias is a 65 year old male who presents for Full skin cancer screening to rule out skin cancer   Last Skin Exam: 2018 1st Baseline: no  Personal HX of Skin Cancer: no but has had AKs in the past  Personal HX of Malignant Melanoma:no   Family HX of Skin Cancer / Malignant Melanoma:   Personal HX of Atypical Moles:   no  Risk factors: history of frequent sun exposure and burns in the past with limited sunscreen use  New / Changing lesions:yes scaly spots on the face  Social History: lives in [place]; putting new dirt in yard (very large yard)  On review of systems, there are no further skin complaints, patient is feeling otherwise well.  See patient intake sheet.  ROS of the following were done and are negative: Constitutional, Eyes, Ears, Nose,   Mouth, Throat, Cardiovascular, Respiratory, GI, Genitourinary, Musculoskeletal,   Psychiatric, Endocrine, Allergic/Immunologic.    PHYSICAL EXAM:   /75 (BP Location: Right arm, Patient Position: Sitting, Cuff Size: Adult Regular)   Pulse 69   Resp 20   Skin exam performed as follows: Type 2 skin. Mood appropriate  Alert and Oriented X 3. Well developed, well nourished in no distress.  General appearance: Normal  Head including face: Normal  Eyes: conjunctiva and lids: Normal  Mouth: Lips, teeth, gums: Normal  Neck: Normal  Chest-breast/axillae: Normal  Back: " Normal  Spleen and liver: Normal  Cardiovascular: Exam of peripheral vascular system by observation for swelling, varicosities, edema: Normal  Genitalia: groin, buttocks: Normal  Extremities: digits/nails (clubbing): Normal  Eccrine and Apocrine glands: Normal  Right upper extremity: Normal  Left upper extremity: Normal  Right lower extremity: Normal  Left lower extremity: Normal  Skin: Scalp and body hair: See below    Pt deferred exam of breasts, groin, buttocks: No    Other physical findings:  1. Multiple pigmented macules on extremities and trunk  2. Multiple pigmented macules on face, trunk and extremities  3. Multiple vascular papules on trunk, arms and legs  4. Multiple scattered keratotic plaques  5. Pink gritty papul minerva the left helix x 1  6. Numerous pink gritty papules on the face       Except as noted above, no other signs of skin cancer or melanoma.     ASSESSMENT/PLAN:   Benign Full skin cancer screening today. . Patient with history of AKs  Advised on monthly self exams and 1 year  Patient Education: Appropriate brochures given.    1. Multiple benign appearing nevi on arms, legs and trunk. Discussed ABCDEs of melanoma and sunscreen.   2. Multiple lentigos on arms, legs and trunk. Advised benign, no treatment needed.  3. Multiple scattered angiomas. Advised benign, no treatment needed.   4. Seborrheic keratosis on arms, legs and trunk. Advised benign, no treatment needed.  5. Actinic keratosis on the left helix x 1. As precancerous, cryosurgery performed. Advised on blistering and post-op care. Advised if not resolved in 1-2 months to return for evaluation  6. Numerous AKs on the face - he has done Efudex and PDT in the past. Had an aggressive reaction to PDT and does not want to redo this. Start efudex BID x 2 weeks.               Follow-up: yearly FSE/PRN sooner    1.) Patient was asked about new and changing moles. YES  2.) Patient received a complete physical skin examination: YES  3.) Patient was  counseled to perform a monthly self skin examination: YES  Scribed By: Eduarda Gaytan, MS, PAMiriamC        Again, thank you for allowing me to participate in the care of your patient.        Sincerely,        EDUARDO QuintanillaC

## 2020-08-27 NOTE — PROGRESS NOTES
"Initial /75 (BP Location: Right arm, Patient Position: Sitting, Cuff Size: Adult Regular)   Pulse 69   Resp 20  Estimated body mass index is 25.81 kg/m  as calculated from the following:    Height as of 2/22/20: 1.708 m (5' 7.25\").    Weight as of 7/29/20: 75.3 kg (166 lb). .    Javy OVERTON RN   Specialty Clinics   "

## 2020-08-27 NOTE — PATIENT INSTRUCTIONS
Using 5-Flurouracil Cream    5-Fluorouracil (5FU) topical cream (brand names Efudex, Carac) is a prescription topical medicine to treat actinic keratoses (pre-squamous cell skin cancer lesions), sun-damaged skin as well as superficial skin cancers.    When applied the areas of sun-damaged skin, the 5FU will  find  damaged skin cells and destroy them.   During treatment, the skin will become red and look very irritated. This is the expected  normal response,  Some patients using 5FU show minimal redness and scaling while others have a very  vigorous  response where the skin scabs and peels. The important thing to realize is that 5FU is treating sun-damaged skin that carries skin cancer risk.      While the skin is irritated, open, sore or scabbed you can apply aquaphor, vaseline or 1% hydrocortisone cream in the morning.     You should apply a thin layer of the cream to the affected area twice a day for 2  weeks every night. A strip of cream the length of your finger tip should be enough to cover your entire face.  For tougher skin like arms, legs, or back, we may suggest longer treatment plans.  However if you react really strong and fast, you might stop earlier or use less frequently. - please call if it is very strong and you are concern you might need to stop early.     Typically very strong reactions are related to lots of underlying sun damage, and this means you are getting a good response to the medication. However, there is no need to be miserable while using this. Please let us know if you are having trouble or concerns!

## 2020-09-14 ENCOUNTER — VIRTUAL VISIT (OUTPATIENT)
Dept: FAMILY MEDICINE | Facility: CLINIC | Age: 65
End: 2020-09-14
Payer: COMMERCIAL

## 2020-09-14 ENCOUNTER — TELEPHONE (OUTPATIENT)
Dept: FAMILY MEDICINE | Facility: CLINIC | Age: 65
End: 2020-09-14

## 2020-09-14 DIAGNOSIS — Z20.822 EXPOSURE TO COVID-19 VIRUS: Primary | ICD-10-CM

## 2020-09-14 PROCEDURE — 99213 OFFICE O/P EST LOW 20 MIN: CPT | Mod: 95 | Performed by: PHYSICIAN ASSISTANT

## 2020-09-14 NOTE — PATIENT INSTRUCTIONS
If your co-worker develops symptoms or tests positive then we will get you tested. I strongly recommend wearing a mask at all times when around others and to social distance as much as possible until the result of his test comes back.

## 2020-09-14 NOTE — TELEPHONE ENCOUNTER
Reason for Call:  Pt had Virtual visit today with LEAH Hernandez.  Pt was exposed to Employee see Virtual Visit today. Pt wanted to give this information to Provider. Employee  who did not have symptoms was exposed 9/6/20 to his Daughter that was tested postive 9/10/11 her Symptoms began  9/9/20. )   Employee at this time does not have symptoms. Until employee's test comes back pt will continue to wear his mask. Does pt need to be tested.       Phone Number Patient can be reached at: Home number on file [phone] (home)    Best Time: Any Time       Can we leave a detailed message on this number? YES    Call taken on 9/14/2020 at 11:29 AM by Corrie Vo

## 2020-09-14 NOTE — PROGRESS NOTES
"Dillon Matias is a 65 year old male who is being evaluated via a billable telephone visit.      The patient has been notified of following:     \"This telephone visit will be conducted via a call between you and your physician/provider. We have found that certain health care needs can be provided without the need for a physical exam.  This service lets us provide the care you need with a short phone conversation.  If a prescription is necessary we can send it directly to your pharmacy.  If lab work is needed we can place an order for that and you can then stop by our lab to have the test done at a later time.    Telephone visits are billed at different rates depending on your insurance coverage. During this emergency period, for some insurers they may be billed the same as an in-person visit.  Please reach out to your insurance provider with any questions.    If during the course of the call the physician/provider feels a telephone visit is not appropriate, you will not be charged for this service.\"    Patient has given verbal consent for Telephone visit?  Yes    What phone number would you like to be contacted at? [phone]    How would you like to obtain your AVS? Oniel Enciso     Dillon Matias is a 65 year old male who presents via phone visit today for the following health issues:    HPI  Concern for COVID-19  About how many days ago did these symptoms start? 0  Is this your first visit for this illness? Yes  In the 14 days before your symptoms started, have you had close contact with someone with COVID-19 (Coronavirus)? Found out this morning a co workers daughter was positive for COVID on 9/9/2020. Coworker had exposure to daughter several days before her symptoms developed. Coworker is currently asymptomatic.   Pt has had close un-masked contact with co-worker within the last week for more then 15 minutes at a time.   Do you have a fever or chills? No  Are you having new or worsening difficulty " breathing? No  Do you have new or worsening cough? No  Have you had any new or unexplained body aches? No    Have you experienced any of the following NEW symptoms?    Headache: No    Sore throat: No    Loss of taste or smell: No    Chest pain: No    Diarrhea: No    Rash: No  What treatments have you tried? NA   Who do you live with? Wife   Are you, or a household member, a healthcare worker or a ? No  Do you live in a nursing home, group home, or shelter? No  Do you have a way to get food/medications if quarantined? Yes, I have a friend or family member who can help me.    Review of Systems   Constitutional, HEENT, cardiovascular, pulmonary, gi and gu systems are negative, except as otherwise noted.     Objective      Vitals:  No vitals were obtained today due to virtual visit.  healthy, alert and no distress  PSYCH: Alert and oriented times 3; coherent speech, normal   rate and volume, able to articulate logical thoughts, able   to abstract reason, no tangential thoughts, no hallucinations   or delusions  His affect is normal  RESP: No cough, no audible wheezing, able to talk in full sentences  Remainder of exam unable to be completed due to telephone visits      Assessment & Plan   Exposure to COVID-19 virus  Asymptomatic patient with possible exposure to Covid-19 infection. He has high-risk contact with an employee whose daughter has tested positive. This co-worker did have 1 high risk exposure near the time of symptoms onset of his daughter. Co-work is getting tested, but is also asymptomatic. Right now this is a lower risk exposure for the patient. If co-worker develops symptoms or tests positive, then we will get him tested. In the meantime, strict adherence to social distancing and mask wearing is recommended at all times for the patient. Pt will follow up if symptoms develop or co-work tests positive so that he can get testing.    Return in about 1 week (around 9/21/2020) for Video  Visit.    Antonio Hernandez PA-C  [facility]    Phone call duration:  6 minutes

## 2020-12-14 ENCOUNTER — TELEPHONE (OUTPATIENT)
Dept: FAMILY MEDICINE | Facility: CLINIC | Age: 65
End: 2020-12-14

## 2020-12-14 DIAGNOSIS — I25.10 CORONARY ARTERY DISEASE INVOLVING NATIVE CORONARY ARTERY OF NATIVE HEART WITHOUT ANGINA PECTORIS: Primary | ICD-10-CM

## 2020-12-14 NOTE — TELEPHONE ENCOUNTER
Says he had a heart attack 5 years ago and [employer] requires him to have treadmill stress test every 2 years. He is due for this this year

## 2020-12-14 NOTE — TELEPHONE ENCOUNTER
Patient called [employer] scheduling asking about how to have a treadmill stress test ordered.    Please advise patient if PCP can place this order or if the order needs to be generated from cardiology.    Krupa Nuñez  [employer] [facility]

## 2020-12-14 NOTE — TELEPHONE ENCOUNTER
Pt notified and understood.  Transferred down to the Cardiac number at [facility]

## 2020-12-18 ENCOUNTER — HOSPITAL ENCOUNTER (OUTPATIENT)
Dept: CARDIOLOGY | Facility: CLINIC | Age: 65
Discharge: HOME | End: 2020-12-18
Attending: PHYSICIAN ASSISTANT | Admitting: PHYSICIAN ASSISTANT
Payer: COMMERCIAL

## 2020-12-18 DIAGNOSIS — I25.10 CORONARY ARTERY DISEASE INVOLVING NATIVE CORONARY ARTERY OF NATIVE HEART WITHOUT ANGINA PECTORIS: ICD-10-CM

## 2020-12-18 PROCEDURE — 93017 CV STRESS TEST TRACING ONLY: CPT

## 2021-01-29 ENCOUNTER — MYC MEDICAL ADVICE (OUTPATIENT)
Dept: FAMILY MEDICINE | Facility: CLINIC | Age: 66
End: 2021-01-29

## 2021-02-04 ENCOUNTER — ANCILLARY PROCEDURE (OUTPATIENT)
Dept: GENERAL RADIOLOGY | Facility: CLINIC | Age: 66
End: 2021-02-04
Attending: PHYSICIAN ASSISTANT
Payer: COMMERCIAL

## 2021-02-04 ENCOUNTER — OFFICE VISIT (OUTPATIENT)
Dept: FAMILY MEDICINE | Facility: CLINIC | Age: 66
End: 2021-02-04
Payer: COMMERCIAL

## 2021-02-04 VITALS
BODY MASS INDEX: 26.3 KG/M2 | SYSTOLIC BLOOD PRESSURE: 122 MMHG | TEMPERATURE: 97.2 F | WEIGHT: 169.2 LBS | DIASTOLIC BLOOD PRESSURE: 72 MMHG | RESPIRATION RATE: 18 BRPM | HEART RATE: 68 BPM

## 2021-02-04 DIAGNOSIS — M79.644 PAIN OF RIGHT THUMB: ICD-10-CM

## 2021-02-04 DIAGNOSIS — M25.531 RIGHT WRIST PAIN: Primary | ICD-10-CM

## 2021-02-04 DIAGNOSIS — M25.531 RIGHT WRIST PAIN: ICD-10-CM

## 2021-02-04 PROCEDURE — 99214 OFFICE O/P EST MOD 30 MIN: CPT | Performed by: PHYSICIAN ASSISTANT

## 2021-02-04 PROCEDURE — 73110 X-RAY EXAM OF WRIST: CPT | Mod: RT | Performed by: RADIOLOGY

## 2021-02-04 ASSESSMENT — PAIN SCALES - GENERAL: PAINLEVEL: MODERATE PAIN (4)

## 2021-02-04 NOTE — PROGRESS NOTES
"  Assessment & Plan   Right wrist pain  Patient presents with several weeks of atraumatic right wrist pain.    History and exam are not concerning for possible fracture.  Differential also includes strain, sprain, hematoma, 1st CMC arthritis.  X-ray of the area demonstrates no fracture or dislocation, per my read. No sigificant 1st CMC arthritis.   Most likely this represents a wrist sprain or overuse injury.  Recommended conservative therapies including rest, ice, elevation, NSAIDs.   As pain recedes, begin normal activities slowly as tolerated. Consider Physical Therapy if symptoms not better with symptomatic care.   Return to clinic in 4 weeks if symptoms not significantly improved, or sooner for any new changing or worsening symptoms.  - XR Wrist Right G/E 3 Views; Future     BMI:   Estimated body mass index is 26.3 kg/m  as calculated from the following:    Height as of 2/22/20: 1.708 m (5' 7.25\").    Weight as of this encounter: 76.7 kg (169 lb 3.2 oz).     Return in about 4 weeks (around 3/4/2021), or if symptoms worsen or fail to improve, for In-Clinic Visit.    TAMEKA Dos [facility]    Zaria Carranza is a 66 year old who presents to clinic today for the following health issues     HPI   Musculoskeletal problem/pain  Onset/Duration: About a month   Description  Location: hand and wrist - right  Joint Swelling: no  Redness: no  Pain: YES  Warmth: no  Intensity:  moderate  Progression of Symptoms:  same  Accompanying signs and symptoms:   Fevers: no  Numbness/tingling/weakness: no  History  Trauma to the area: no  Recent illness:  no  Previous similar problem: no  Previous evaluation:  no  Precipitating or alleviating factors:  Aggravating factors include: overuse  Therapies tried and outcome: Ibuprofen    Review of Systems   Constitutional, HEENT, cardiovascular, pulmonary, gi and gu systems are negative, except as otherwise noted.      Objective    /72 (BP " Location: Right arm, Patient Position: Sitting, Cuff Size: Adult Large)   Pulse 68   Temp 97.2  F (36.2  C) (Tympanic)   Resp 18   Wt 76.7 kg (169 lb 3.2 oz)   BMI 26.30 kg/m    Body mass index is 26.3 kg/m .  Physical Exam   Constitutional: healthy, alert, and no distress  Head: Normocephalic. Atraumatic  Eyes: No conjunctival injection, sclera anicteric  Respiratory: No resp distress.  Musculoskeletal: extremities normal- no gross deformities noted, and normal muscle tone. Tenderness over the dorsal aspect of the R wrist. Tenderness at the 1st CMC. FROM of the wrist. Negative  Finkelstein sign.  Skin: no suspicious lesions or rashes  Neurologic: Gait normal. CN 2-12 grossly intact. Sensation grossly intact in the median, radial and ulnar distributions. Able to appose thumb, give thumbs up, spread fingers against resistance. 5/5  strength, wrist flexion and extension.   Psychiatric: mentation appears normal and affect normal/bright    Xray - Reviewed and interpreted by me.  There is no evidence of fracture or dislocation per my read.

## 2021-02-04 NOTE — PATIENT INSTRUCTIONS
Patient Education     Treating Strains and Sprains    Strains and sprains happen when muscles or other soft tissues near your bones stretch or tear. These injuries can cause bruising, swelling, and pain. To ease your discomfort and speed the healing of your strain or sprain, follow the tips below. Remember, a strain or sprain can take 6 to 8 weeks to heal.  Important Note: Do not give aspirin to children or teens without discussing it with your healthcare provider first.  Ice first, heat later    Use ice for the first 24 to 48 hours after injury. Ice helps prevent swelling and reduce pain. Ice the injury for no more than 20 minutes at a time and allow at least 20 minutes between icing sessions.    Apply heat after the first 72 hours, once the swelling has gone down. Heat relaxes muscles and increases blood flow. Soak the injured area in warm water or use a heating pad set on low for no more than 15 minutes at a time.  Wrap and elevate    Wrap an injured limb firmly with an elastic bandage. This provides support and helps prevent swelling. Don t wear an elastic bandage overnight. Watch for tingling, numbness, or increased pain. Remove the bandage immediately if any of these occurs.    Elevate the injured area to help reduce swelling and throbbing. It s best to raise an injured limb above the level of your heart.  Medicines    Over-the-counter medicines such as acetaminophen or ibuprofen can help reduce pain. Some also help reduce swelling.    Take medicine only as directed.    Rest the area even if medicines are controlling the pain.  Rest    Rest the injured area by not using it for 24 hours.    When you re ready, return slowly to your normal activities. Rest the injured area often.    Don t use or walk on an injured limb if it hurts.  [employer] last reviewed this educational content on 1/1/2018 2000-2020 [employer]. [address] [place] [address], [place] [address]. All rights reserved. This information  is not intended as a substitute for professional medical care. Always follow your healthcare professional's instructions.

## 2021-03-08 ENCOUNTER — IMMUNIZATION (OUTPATIENT)
Dept: NURSING | Facility: CLINIC | Age: 66
End: 2021-03-08
Payer: COMMERCIAL

## 2021-03-08 PROCEDURE — 0031A PR COVID VAC JANSSEN AD26 0.5ML: CPT

## 2021-03-08 PROCEDURE — 91303 PR COVID VAC JANSSEN AD26 0.5ML: CPT

## 2021-04-03 ENCOUNTER — HEALTH MAINTENANCE LETTER (OUTPATIENT)
Age: 66
End: 2021-04-03

## 2021-07-28 ENCOUNTER — MYC MEDICAL ADVICE (OUTPATIENT)
Dept: FAMILY MEDICINE | Facility: CLINIC | Age: 66
End: 2021-07-28

## 2021-07-28 DIAGNOSIS — I25.10 CORONARY ARTERY DISEASE INVOLVING NATIVE CORONARY ARTERY OF NATIVE HEART WITHOUT ANGINA PECTORIS: ICD-10-CM

## 2021-07-28 RX ORDER — ATORVASTATIN CALCIUM 40 MG/1
40 TABLET, FILM COATED ORAL DAILY
Qty: 90 TABLET | Refills: 0 | Status: SHIPPED | OUTPATIENT
Start: 2021-07-28 | End: 2021-11-11

## 2021-07-29 ENCOUNTER — DOCUMENTATION ONLY (OUTPATIENT)
Dept: LAB | Facility: CLINIC | Age: 66
End: 2021-07-29

## 2021-07-29 DIAGNOSIS — E78.5 DYSLIPIDEMIA: ICD-10-CM

## 2021-07-29 DIAGNOSIS — I25.10 CORONARY ARTERY DISEASE INVOLVING NATIVE CORONARY ARTERY OF NATIVE HEART WITHOUT ANGINA PECTORIS: Primary | ICD-10-CM

## 2021-07-30 ENCOUNTER — LAB (OUTPATIENT)
Dept: LAB | Facility: CLINIC | Age: 66
End: 2021-07-30
Payer: COMMERCIAL

## 2021-07-30 DIAGNOSIS — E78.5 DYSLIPIDEMIA: ICD-10-CM

## 2021-07-30 LAB
CHOLEST SERPL-MCNC: 126 MG/DL
FASTING STATUS PATIENT QL REPORTED: YES
HDLC SERPL-MCNC: 64 MG/DL
LDLC SERPL CALC-MCNC: 50 MG/DL
NONHDLC SERPL-MCNC: 62 MG/DL
TRIGL SERPL-MCNC: 58 MG/DL

## 2021-07-30 PROCEDURE — 36415 COLL VENOUS BLD VENIPUNCTURE: CPT

## 2021-07-30 PROCEDURE — 80061 LIPID PANEL: CPT

## 2021-09-18 ENCOUNTER — HEALTH MAINTENANCE LETTER (OUTPATIENT)
Age: 66
End: 2021-09-18

## 2021-10-18 ENCOUNTER — OFFICE VISIT (OUTPATIENT)
Dept: DERMATOLOGY | Facility: CLINIC | Age: 66
End: 2021-10-18
Payer: COMMERCIAL

## 2021-10-18 VITALS
BODY MASS INDEX: 26.11 KG/M2 | HEART RATE: 70 BPM | DIASTOLIC BLOOD PRESSURE: 69 MMHG | HEIGHT: 68 IN | SYSTOLIC BLOOD PRESSURE: 118 MMHG

## 2021-10-18 DIAGNOSIS — D18.01 ANGIOMA OF SKIN: ICD-10-CM

## 2021-10-18 DIAGNOSIS — L30.9 DERMATITIS: ICD-10-CM

## 2021-10-18 DIAGNOSIS — D22.9 NEVUS: ICD-10-CM

## 2021-10-18 DIAGNOSIS — L82.1 SEBORRHEIC KERATOSIS: ICD-10-CM

## 2021-10-18 DIAGNOSIS — D48.5 NEOPLASM OF UNCERTAIN BEHAVIOR OF SKIN: ICD-10-CM

## 2021-10-18 DIAGNOSIS — L81.4 LENTIGO: ICD-10-CM

## 2021-10-18 DIAGNOSIS — L57.0 ACTINIC KERATOSIS: Primary | ICD-10-CM

## 2021-10-18 PROCEDURE — 17003 DESTRUCT PREMALG LES 2-14: CPT | Mod: 59 | Performed by: PHYSICIAN ASSISTANT

## 2021-10-18 PROCEDURE — 88305 TISSUE EXAM BY PATHOLOGIST: CPT | Performed by: PATHOLOGY

## 2021-10-18 PROCEDURE — 99214 OFFICE O/P EST MOD 30 MIN: CPT | Mod: 25 | Performed by: PHYSICIAN ASSISTANT

## 2021-10-18 PROCEDURE — 11102 TANGNTL BX SKIN SINGLE LES: CPT | Performed by: PHYSICIAN ASSISTANT

## 2021-10-18 PROCEDURE — 17000 DESTRUCT PREMALG LESION: CPT | Mod: 59 | Performed by: PHYSICIAN ASSISTANT

## 2021-10-18 RX ORDER — TRIAMCINOLONE ACETONIDE 1 MG/G
CREAM TOPICAL
Qty: 454 G | Refills: 2 | Status: SHIPPED | OUTPATIENT
Start: 2021-10-18 | End: 2024-07-03

## 2021-10-18 NOTE — PROGRESS NOTES
"HPI:   Chief complaints: Dillon Matias is a 66 year old male who presents for Full skin cancer screening to rule out skin cancer   Last Skin Exam: 1 year ago     1st Baseline: no  Personal HX of Skin Cancer: no but has had AKs in the past  Personal HX of Malignant Melanoma:no   Family HX of Skin Cancer / Malignant Melanoma:   Personal HX of Atypical Moles:   no  Risk factors: history of frequent sun exposure and burns in the past with limited sunscreen use  New / Changing lesions: yes new spot on the left shoulder  Social History: lives in [place]  On review of systems, there are no further skin complaints, patient is feeling otherwise well.  See patient intake sheet.  ROS of the following were done and are negative: Constitutional, Eyes, Ears, Nose,   Mouth, Throat, Cardiovascular, Respiratory, GI, Genitourinary, Musculoskeletal,   Psychiatric, Endocrine, Allergic/Immunologic.    PHYSICAL EXAM:   /69   Pulse 70   Ht 1.715 m (5' 7.5\")   BMI 26.11 kg/m    Skin exam performed as follows: Type 2 skin. Mood appropriate  Alert and Oriented X 3. Well developed, well nourished in no distress.  General appearance: Normal  Head including face: Normal  Eyes: conjunctiva and lids: Normal  Mouth: Lips, teeth, gums: Normal  Neck: Normal  Chest-breast/axillae: Normal  Back: Normal  Spleen and liver: Normal  Cardiovascular: Exam of peripheral vascular system by observation for swelling, varicosities, edema: Normal  Genitalia: groin, buttocks: Normal  Extremities: digits/nails (clubbing): Normal  Eccrine and Apocrine glands: Normal  Right upper extremity: Normal  Left upper extremity: Normal  Right lower extremity: Normal  Left lower extremity: Normal  Skin: Scalp and body hair: See below    Pt deferred exam of breasts, groin, buttocks: No    Other physical findings:  1. Multiple pigmented macules on extremities and trunk  2. Multiple pigmented macules on face, trunk and extremities  3. Multiple vascular papules on " trunk, arms and legs  4. Multiple scattered keratotic plaques  5. Pink gritty papules on the vertex scalp x 7  6. Pink firm papule on the left superior shoulder  7. Dermatitis on the lower legs        Except as noted above, no other signs of skin cancer or melanoma.     ASSESSMENT/PLAN:   Benign Full skin cancer screening today. . Patient with history of AKs  Advised on monthly self exams and 1 year  Patient Education: Appropriate brochures given.    1. Multiple benign appearing nevi on arms, legs and trunk. Discussed ABCDEs of melanoma and sunscreen.   2. Multiple lentigos on arms, legs and trunk. Advised benign, no treatment needed.  3. Multiple scattered angiomas. Advised benign, no treatment needed.   4. Seborrheic keratosis on arms, legs and trunk. Advised benign, no treatment needed.  5. Actinic keratosis on the vertex scalp x 7. As precancerous, cryosurgery performed. Advised on blistering and post-op care. Advised if not resolved in 1-2 months to return for evaluation  6. Dermatofibroma vs scar tissue r/o skin malignancy on the left superior shoulder Shave biopsy performed.  Area cleaned and anesthetized with 1% lidocaine with epinephrine.  Dermablade used to remove the lesion and sent to pathology. Bleeding was cauterized. Pt tolerated procedure well with no complications.   7. Dermatitis on the legs- start TAC cream BID x 1-2 weeks PRN; continue thick emollients daily                Follow-up: yearly FSE/PRN sooner    1.) Patient was asked about new and changing moles. YES  2.) Patient received a complete physical skin examination: YES  3.) Patient was counseled to perform a monthly self skin examination: YES  Scribed By: Eduarda Gaytan, MS, PA-C

## 2021-10-20 LAB
PATH REPORT.COMMENTS IMP SPEC: NORMAL
PATH REPORT.COMMENTS IMP SPEC: NORMAL
PATH REPORT.FINAL DX SPEC: NORMAL
PATH REPORT.GROSS SPEC: NORMAL
PATH REPORT.MICROSCOPIC SPEC OTHER STN: NORMAL
PATH REPORT.RELEVANT HX SPEC: NORMAL

## 2021-11-08 ENCOUNTER — OFFICE VISIT (OUTPATIENT)
Dept: DERMATOLOGY | Facility: CLINIC | Age: 66
End: 2021-11-08
Payer: COMMERCIAL

## 2021-11-08 VITALS — DIASTOLIC BLOOD PRESSURE: 80 MMHG | SYSTOLIC BLOOD PRESSURE: 133 MMHG | OXYGEN SATURATION: 98 % | HEART RATE: 72 BPM

## 2021-11-08 DIAGNOSIS — C44.619 BASAL CELL CARCINOMA (BCC) OF LEFT SHOULDER: Primary | ICD-10-CM

## 2021-11-08 PROCEDURE — 17314 MOHS ADDL STAGE T/A/L: CPT | Performed by: DERMATOLOGY

## 2021-11-08 PROCEDURE — 12032 INTMD RPR S/A/T/EXT 2.6-7.5: CPT | Performed by: DERMATOLOGY

## 2021-11-08 PROCEDURE — 17313 MOHS 1 STAGE T/A/L: CPT | Performed by: DERMATOLOGY

## 2021-11-08 PROCEDURE — 99207 PR NO CHARGE LOS: CPT | Performed by: DERMATOLOGY

## 2021-11-08 NOTE — PROGRESS NOTES
Dillon Matias is an extremely pleasant 66 year old year old male patient here today for evaluation and managment of basal cell carcinoma on left shoulder.  Patient has no other skin complaints today.  Remainder of the HPI, Meds, PMH, Allergies, FH, and SH was reviewed in chart.      Past Medical History:   Diagnosis Date     Actinic keratosis      Basal cell carcinoma        History reviewed. No pertinent surgical history.     History reviewed. No pertinent family history.    Social History     Socioeconomic History     Marital status:      Spouse name: Not on file     Number of children: Not on file     Years of education: Not on file     Highest education level: Not on file   Occupational History     Not on file   Tobacco Use     Smoking status: Former Smoker     Smokeless tobacco: Never Used   Substance and Sexual Activity     Alcohol use: Not on file     Drug use: Not on file     Sexual activity: Not on file   Other Topics Concern     Not on file   Social History Narrative     Not on file     Social Determinants of Health     Financial Resource Strain: Not on file   Food Insecurity: Not on file   Transportation Needs: Not on file   Physical Activity: Not on file   Stress: Not on file   Social Connections: Not on file   Intimate Partner Violence: Not on file   Housing Stability: Not on file       Outpatient Encounter Medications as of 11/8/2021   Medication Sig Dispense Refill     atorvastatin (LIPITOR) 40 MG tablet Take 1 tablet (40 mg) by mouth daily 90 tablet 0     triamcinolone (KENALOG) 0.1 % external cream Apply to AA on the leg BID x 1-2 week then PRN only 454 g 2     aspirin (ASA) 81 MG chewable tablet Take 1 tablet (81 mg) by mouth daily (Patient not taking: Reported on 11/8/2021) 90 tablet 3     fluorouracil (EFUDEX) 5 % external cream Apply to face BID x 2 weeks (Patient not taking: Reported on 9/14/2020) 40 g 3     imiquimod (ALDARA) 5 % external cream Apply topically twice weekly at bedtime  to face or scalp (but not both) and wash off after 8 hours. May use for up to 16 weeks. (Patient not taking: Reported on 9/14/2020) 8 packet 3     No facility-administered encounter medications on file as of 11/8/2021.             O:   NAD, WDWN, Alert & Oriented, Mood & Affect wnl, Vitals stable   Here today alone   /80 (BP Location: Left arm)   Pulse 72   SpO2 98%    General appearance normal   Vitals stable   Alert, oriented and in no acute distress     L shoulder ulcerated 1.5cm red plaque       Eyes: Conjunctivae/lids:Normal     ENT: Lips, buccal mucosa, tongue: normal    MSK:Normal    Cardiovascular: peripheral edema none    Pulm: Breathing Normal    Neuro/Psych: Orientation:Alert and Orientedx3 ; Mood/Affect:normal       A/P:  1. L shoulder basal cell carcinoma   MOHS:   Size    The rationale for Mohs surgery was discussed with the patient and consent was obtained.  The risks and benefits as well as alternatives to therapy were discussed, in detail.  Specifically, the risks of infection, scarring, bleeding, prolonged wound healing, incomplete removal, allergy to anesthesia, nerve injury and recurrence were addressed.  Indication for Mohs was Size. Prior to the procedure, the treatment site was clearly identified and, if available, confirmed with previous photos and confirmed by the patient   All components of the Universal Protocol/PAUSE rule were completed.  The Mohs surgeon operated in two distinct and integrated capacities as the surgeon and pathologist.      The area was prepped with Betasept.  A rim of normal appearing skin was marked circumferentially around the lesion.  The area was infiltrated with local anesthesia.  The tumor was first debulked to remove all clinically apparent tumor.  An incision following the standard Mohs approach was done and the specimen was oriented,mapped and placed in 2 block(s).  Each specimen was then chromacoded and processed in the Mohs laboratory using standard  Mohs technique and submitted for frozen section histology.  Frozen section analysis showed  residual tumor but CLEAR MARGINS.    First stage:Orthokeratosis of epidermis with a proliferation of nests of basaloid cells, with peripheral palisading and a haphazard arrangement in the center extending into the dermis, forming infiltrative nodules.  The tumor cells have hyperchromatic nuclei. Poor cytoplasm and intercellular bridging.      The tumor was excised using standard Mohs technique in 2 stages(s).  CLEAR MARGINS OBTAINED and Final defect size was 2.3 x 2.5 cm.     We discussed the options for wound management in full with the patient including risks/benefits/ possible outcomes.        REPAIR int: Because of the tightness of the surrounding skin and Because of the size and full thickness nature of the defect, a int closure was planned. After LEC anesthesia and prep, Burow's triangles were excised in the relaxed skin tension lines. The wound edges were undermined by dissection in the subcutaneous plane until adequate tissue mobility was obtained. Hemostasis was obtained. The wound edges were closed in a layered fashion using Vicryl and Fast Absorbing Plain Gut sutures. Postoperative length was 5 cm.   EBL minimal; complications none; wound care routine.  The patient was discharged in good condition and will return in one week for wound evaluation.    It was a pleasure speaking to Dillon Matias today.  Previous clinic notes and pertinent laboratory tests were reviewed prior to Dillon Matias's visit.  Nature and genetics of benign skin lesions dicussed with patient.  Signs and Symptoms of skin cancer discussed with patient.  Patient encouraged to perform monthly skin exams.  UV precautions reviewed with patient.  Risks of non-melanoma skin cancer discussed with patient   Return to clinic 6 months

## 2021-11-08 NOTE — LETTER
11/8/2021         RE: Dlilon Matias  [address] [facility] [id number]        Dear Colleague,    Thank you for referring your patient, Dillon Matias, to the [facility]. Please see a copy of my visit note below.    Dillon Matias is an extremely pleasant 66 year old year old male patient here today for evaluation and managment of basal cell carcinoma on left shoulder.  Patient has no other skin complaints today.  Remainder of the HPI, Meds, PMH, Allergies, FH, and SH was reviewed in chart.      Past Medical History:   Diagnosis Date     Actinic keratosis      Basal cell carcinoma        History reviewed. No pertinent surgical history.     History reviewed. No pertinent family history.    Social History     Socioeconomic History     Marital status:      Spouse name: Not on file     Number of children: Not on file     Years of education: Not on file     Highest education level: Not on file   Occupational History     Not on file   Tobacco Use     Smoking status: Former Smoker     Smokeless tobacco: Never Used   Substance and Sexual Activity     Alcohol use: Not on file     Drug use: Not on file     Sexual activity: Not on file   Other Topics Concern     Not on file   Social History Narrative     Not on file     Social Determinants of Health     Financial Resource Strain: Not on file   Food Insecurity: Not on file   Transportation Needs: Not on file   Physical Activity: Not on file   Stress: Not on file   Social Connections: Not on file   Intimate Partner Violence: Not on file   Housing Stability: Not on file       Outpatient Encounter Medications as of 11/8/2021   Medication Sig Dispense Refill     atorvastatin (LIPITOR) 40 MG tablet Take 1 tablet (40 mg) by mouth daily 90 tablet 0     triamcinolone (KENALOG) 0.1 % external cream Apply to AA on the leg BID x 1-2 week then PRN only 454 g 2     aspirin (ASA) 81 MG chewable tablet Take 1 tablet (81 mg) by mouth daily (Patient  not taking: Reported on 11/8/2021) 90 tablet 3     fluorouracil (EFUDEX) 5 % external cream Apply to face BID x 2 weeks (Patient not taking: Reported on 9/14/2020) 40 g 3     imiquimod (ALDARA) 5 % external cream Apply topically twice weekly at bedtime to face or scalp (but not both) and wash off after 8 hours. May use for up to 16 weeks. (Patient not taking: Reported on 9/14/2020) 8 packet 3     No facility-administered encounter medications on file as of 11/8/2021.             O:   NAD, WDWN, Alert & Oriented, Mood & Affect wnl, Vitals stable   Here today alone   /80 (BP Location: Left arm)   Pulse 72   SpO2 98%    General appearance normal   Vitals stable   Alert, oriented and in no acute distress     L shoulder ulcerated 1.5cm red plaque       Eyes: Conjunctivae/lids:Normal     ENT: Lips, buccal mucosa, tongue: normal    MSK:Normal    Cardiovascular: peripheral edema none    Pulm: Breathing Normal    Neuro/Psych: Orientation:Alert and Orientedx3 ; Mood/Affect:normal       A/P:  1. L shoulder basal cell carcinoma   MOHS:   Size    The rationale for Mohs surgery was discussed with the patient and consent was obtained.  The risks and benefits as well as alternatives to therapy were discussed, in detail.  Specifically, the risks of infection, scarring, bleeding, prolonged wound healing, incomplete removal, allergy to anesthesia, nerve injury and recurrence were addressed.  Indication for Mohs was Size. Prior to the procedure, the treatment site was clearly identified and, if available, confirmed with previous photos and confirmed by the patient   All components of the Universal Protocol/PAUSE rule were completed.  The Mohs surgeon operated in two distinct and integrated capacities as the surgeon and pathologist.      The area was prepped with Betasept.  A rim of normal appearing skin was marked circumferentially around the lesion.  The area was infiltrated with local anesthesia.  The tumor was first debulked  to remove all clinically apparent tumor.  An incision following the standard Mohs approach was done and the specimen was oriented,mapped and placed in 2 block(s).  Each specimen was then chromacoded and processed in the Mohs laboratory using standard Mohs technique and submitted for frozen section histology.  Frozen section analysis showed  residual tumor but CLEAR MARGINS.    First stage:Orthokeratosis of epidermis with a proliferation of nests of basaloid cells, with peripheral palisading and a haphazard arrangement in the center extending into the dermis, forming infiltrative nodules.  The tumor cells have hyperchromatic nuclei. Poor cytoplasm and intercellular bridging.      The tumor was excised using standard Mohs technique in 2 stages(s).  CLEAR MARGINS OBTAINED and Final defect size was 2.3 x 2.5 cm.     We discussed the options for wound management in full with the patient including risks/benefits/ possible outcomes.        REPAIR int: Because of the tightness of the surrounding skin and Because of the size and full thickness nature of the defect, a int closure was planned. After LEC anesthesia and prep, Burow's triangles were excised in the relaxed skin tension lines. The wound edges were undermined by dissection in the subcutaneous plane until adequate tissue mobility was obtained. Hemostasis was obtained. The wound edges were closed in a layered fashion using Vicryl and Fast Absorbing Plain Gut sutures. Postoperative length was 5 cm.   EBL minimal; complications none; wound care routine.  The patient was discharged in good condition and will return in one week for wound evaluation.    It was a pleasure speaking to Dillon Matias today.  Previous clinic notes and pertinent laboratory tests were reviewed prior to Dillon Matias's visit.  Nature and genetics of benign skin lesions dicussed with patient.  Signs and Symptoms of skin cancer discussed with patient.  Patient encouraged to perform monthly skin  exams.  UV precautions reviewed with patient.  Risks of non-melanoma skin cancer discussed with patient   Return to clinic 6 months        Again, thank you for allowing me to participate in the care of your patient.        Sincerely,        Herrera Paul MD

## 2021-11-08 NOTE — PATIENT INSTRUCTIONS
Sutured Wound Care     Left posterior shoulder    [facility]: [phone]    [facility]: [phone]          ? No strenuous activity for 48 hours. Resume moderate activity in 48 hours. No heavy exercising until you are seen for follow up in one week.     ? Take Tylenol as needed for discomfort.                         ? Do not drink alcoholic beverages for 48 hours.     ? Keep the pressure bandage in place for 24 hours. If the bandage becomes blood tinged or loose, reinforce it with gauze and tape.        (Refer to the reverse side of this page for management of bleeding).    ? Remove pressure bandage in 24 hours     ? Leave the flat bandage in place until your follow up appointment.    ? Keep the bandage dry. Wash around it carefully.    ? If the tape becomes soiled or starts to come off, reinforce it with additional paper tape.    ? Do not smoke for 3 weeks; smoking is detrimental to wound healing.    ? It is normal to have swelling and bruising around the surgical site. The bruising will fade in approximately 10-14 days. Elevate the area to reduce swelling.    ? Numbness, itchiness and sensitivity to temperature changes can occur after surgery and may take up to 18 months to normalize.      POSSIBLE COMPLICATIONS    BLEEDIN. Leave the bandage in place.  2. Use tightly rolled up gauze or a cloth to apply direct pressure over the bandage for 20   minutes.  3. Reapply pressure for an additional 20 minutes if necessary  4. Call the office or go to the nearest emergency room if pressure fails to stop the bleeding.  5. Use additional gauze and tape to maintain pressure once the bleeding has stopped.        PAIN:    1. Post operative pain should slowly get better, never worse.  2. A severe increase in pain may indicate a problem. Call the office if this occurs.    In case of emergency phone:Dr Paul [phone]

## 2021-11-08 NOTE — NURSING NOTE
"Initial /80 (BP Location: Left arm)   Pulse 72   SpO2 98%  Estimated body mass index is 26.11 kg/m  as calculated from the following:    Height as of 10/18/21: 1.715 m (5' 7.5\").    Weight as of 2/4/21: 76.7 kg (169 lb 3.2 oz). .      "

## 2021-11-11 ENCOUNTER — MYC REFILL (OUTPATIENT)
Dept: FAMILY MEDICINE | Facility: CLINIC | Age: 66
End: 2021-11-11
Payer: COMMERCIAL

## 2021-11-11 DIAGNOSIS — I25.10 CORONARY ARTERY DISEASE INVOLVING NATIVE CORONARY ARTERY OF NATIVE HEART WITHOUT ANGINA PECTORIS: ICD-10-CM

## 2021-11-15 ENCOUNTER — OFFICE VISIT (OUTPATIENT)
Dept: DERMATOLOGY | Facility: CLINIC | Age: 66
End: 2021-11-15
Payer: COMMERCIAL

## 2021-11-15 DIAGNOSIS — Z48.01 ENCOUNTER FOR CHANGE OR REMOVAL OF SURGICAL WOUND DRESSING: Primary | ICD-10-CM

## 2021-11-15 PROCEDURE — 99207 PR NO CHARGE NURSE ONLY: CPT

## 2021-11-15 RX ORDER — ATORVASTATIN CALCIUM 40 MG/1
40 TABLET, FILM COATED ORAL DAILY
Qty: 90 TABLET | Refills: 3 | Status: SHIPPED | OUTPATIENT
Start: 2021-11-15 | End: 2022-11-30

## 2021-11-15 RX ORDER — ATORVASTATIN CALCIUM 40 MG/1
TABLET, FILM COATED ORAL
Qty: 90 TABLET | Refills: 0 | OUTPATIENT
Start: 2021-11-15

## 2021-11-15 NOTE — PATIENT INSTRUCTIONS

## 2021-11-16 NOTE — PROGRESS NOTES
Pt returned to clinic for post surgery 1 week follow up bandage change. Pt has no complaints, denies pain. Bandage removed from left shoulder, area cleansed with normal saline. Site is healing and wound edges approximating well. Reapplied new steri strips and paper tape.    Advised to watch for signs/sx of infection; spreading redness, drainage, odor, fever. Call or report promptly to clinic. Pt given written instructions and informed to rtc as needed. Patient verbalized understanding.     Rona Martinez LPN.................11/16/2021

## 2021-11-16 NOTE — NURSING NOTE
Pt returned to clinic for post surgery 1 week follow up bandage change. Pt has no complaints, denies pain. Bandage removed from left shoulder, area cleansed with normal saline. Site is healing and wound edges approximating well. Reapplied new steri strips and tega-derm.    Advised to watch for signs/sx of infection; spreading redness, drainage, odor, fever. Call or report promptly to clinic. Pt given written instructions and informed to rtc as needed. Patient verbalized understanding.     Rona Martinez LPN.................11/16/2021

## 2022-01-27 ENCOUNTER — TELEPHONE (OUTPATIENT)
Dept: FAMILY MEDICINE | Facility: CLINIC | Age: 67
End: 2022-01-27
Payer: COMMERCIAL

## 2022-01-27 NOTE — TELEPHONE ENCOUNTER
Request for letter from Hawk Hernandez for a statement of stable cardiac health and clearance to drive Commercial Vehicle    Letter in Epic dated 1/26/22 faxed to [place] [facility] [place] [facility]   Fax: [phone]  Phone [phone]

## 2022-03-02 ENCOUNTER — OFFICE VISIT (OUTPATIENT)
Dept: FAMILY MEDICINE | Facility: CLINIC | Age: 67
End: 2022-03-02
Payer: COMMERCIAL

## 2022-03-02 VITALS
BODY MASS INDEX: 25.71 KG/M2 | HEART RATE: 76 BPM | WEIGHT: 166.6 LBS | RESPIRATION RATE: 20 BRPM | DIASTOLIC BLOOD PRESSURE: 78 MMHG | SYSTOLIC BLOOD PRESSURE: 136 MMHG | OXYGEN SATURATION: 98 %

## 2022-03-02 DIAGNOSIS — Z00.00 ENCOUNTER FOR MEDICARE ANNUAL WELLNESS EXAM: ICD-10-CM

## 2022-03-02 DIAGNOSIS — R06.83 SNORING: Primary | ICD-10-CM

## 2022-03-02 DIAGNOSIS — Z87.891 PERSONAL HISTORY OF TOBACCO USE: ICD-10-CM

## 2022-03-02 PROCEDURE — 99213 OFFICE O/P EST LOW 20 MIN: CPT | Mod: 25 | Performed by: PHYSICIAN ASSISTANT

## 2022-03-02 PROCEDURE — 99397 PER PM REEVAL EST PAT 65+ YR: CPT | Performed by: PHYSICIAN ASSISTANT

## 2022-03-02 ASSESSMENT — ENCOUNTER SYMPTOMS
ARTHRALGIAS: 1
NAUSEA: 0
PALPITATIONS: 0
DIZZINESS: 0
MYALGIAS: 1
CONSTIPATION: 0
WEAKNESS: 0
CHILLS: 0
PARESTHESIAS: 0
HEMATOCHEZIA: 0
DYSURIA: 0
FREQUENCY: 0
EYE PAIN: 0
JOINT SWELLING: 0
SORE THROAT: 0
SHORTNESS OF BREATH: 0
ABDOMINAL PAIN: 0
HEADACHES: 0
HEMATURIA: 0
FEVER: 0
HEARTBURN: 0
DIARRHEA: 0
COUGH: 0
NERVOUS/ANXIOUS: 0

## 2022-03-02 ASSESSMENT — ACTIVITIES OF DAILY LIVING (ADL): CURRENT_FUNCTION: NO ASSISTANCE NEEDED

## 2022-03-02 ASSESSMENT — PAIN SCALES - GENERAL: PAINLEVEL: NO PAIN (0)

## 2022-03-02 NOTE — PROGRESS NOTES
Lung Cancer Screening Shared Decision Making Visit     Dillon Matias is eligible for lung cancer screening on the basis of the information provided in my signed lung cancer screening order.     I have discussed with patient the risks and benefits of screening for lung cancer with low-dose CT.     The risks include:  radiation exposure: one low dose chest CT has as much ionizing radiation as about 15 chest x-rays or 6 months of background radiation living in [place]    false positives: 96% of positive findings/nodules are NOT cancer, but some might still require additional diagnostic evaluation, including biopsy  over-diagnosis: some slow growing cancers that might never have been clinically significant will be detected and treated unnecessarily     The benefit of early detection of lung cancer is contingent upon adherence to annual screening or more frequent follow up if indicated.     Furthermore, reaping the benefits of screening requires Dillon Matias to be willing and physically able to undergo diagnostic procedures, if indicated. Although no specific guide is available for determining severity of comorbidities, it is reasonable to withhold screening in patients who have greater mortality risk from other diseases.     We did discuss that the only way to prevent lung cancer is to not smoke. Smoking cessation counseling was not given.      I did not offer risk estimation using a calculator such as this one:    ShouldIScreen

## 2022-03-02 NOTE — PROGRESS NOTES
Assessment & Plan   Snoring  Worsening. Wife says there is apnea at night. Doing well otherwise. BMI is only 25. Referral for sleep study.   - Adult Sleep Eval & Management  Referral; Future    Personal history of tobacco use  Shared decision making discussed. Pt elected for LDCT scan for lung cancer screening. Also due for AAA screening. Both ordered today.   - Prof Fee: Shared Decision Making Visit for Lung Cancer Screening  - CT Chest Lung Cancer Scrn Low Dose wo; Future  -  Aorta Medicare AAA Screening; Future    Encounter for Medicare annual wellness exam  Healthy 67 yr old here for physical exam. Last physical exam done 1 year ago. Discussed preventative screenings which are updated below. Counseled on immunizations and healthy lifestyle.   Plan: Follow-up in 1 year for repeat physical exam.     Return in about 53 weeks (around 3/8/2023) for Annual Wellness Visit.    Antonio Hernandez PA-C  [facility]    Zaria Carranza is a 67 year old who presents for the following health issues     HPI   Concern - Snoring   Onset: years   Description: Patient states it just seems to be getting worse. Also says that the more tired he is when he goes to bed the more likely he is to snore  Intensity: moderate  Progression of Symptoms:  worsening  Accompanying Signs & Symptoms: none  Previous history of similar problem: no   Precipitating factors:        Worsened by: na   Alleviating factors:        Improved by: na   Therapies tried and outcome:  none     Annual Wellness Visit    Patient has been advised of split billing requirements and indicates understanding: Yes     Are you in the first 12 months of your Medicare Part B coverage?  No    Physical Health:    In general, how would you rate your overall physical health? excellent    Outside of work, how many days during the week do you exercise?4-5 days/week    Outside of work, approximately how many minutes a day do you exercise?less  "than 15 minutes    If you drink alcohol do you typically have >3 drinks per day or >7 drinks per week? No    Do you usually eat at least 4 servings of fruit and vegetables a day, include whole grains & fiber and avoid regularly eating high fat or \"junk\" foods? NO    Do you have any problems taking medications regularly? No    Do you have any side effects from medications? none    Needs assistance for the following daily activities: no assistance needed    Which of the following safety concerns are present in your home?  none identified     Hearing impairment: No    In the past 6 months, have you been bothered by leaking of urine? no    Mental Health:    In general, how would you rate your overall mental or emotional health? excellent  PHQ-2 Score: 0    Do you feel safe in your environment? Yes    Have you ever done Advance Care Planning? (For example, a Health Directive, POLST, or a discussion with a medical provider or your loved ones about your wishes)? Yes, patient states has an Advance Care Planning document and will bring a copy to the clinic.    Fall risk:  Fallen 2 or more times in the past year?: No  Any fall with injury in the past year?: No    Cognitive Screenin) Repeat 3 items (Leader, Season, Table)    2) Clock draw: ABNORMAL 12:10  3) 3 item recall: Recalls 2 objects   Results: ABNORMAL clock, 1-2 items recalled: PROBABLE COGNITIVE IMPAIRMENT, **INFORM PROVIDER**    Mini-CogTM Copyright SRIDHAR Mckenzie. Licensed by the author for use in [facility]; reprinted with permission (aen@[facility]). All rights reserved.      Do you have sleep apnea, excessive snoring or daytime drowsiness?: yes    Current providers sharing in care for this patient include:   Patient Care Team:  Antonio Hernandez PA-C as PCP - General (Physician Assistant)  Antonio Hernandez PA-C as Assigned PCP  Herrera Paul MD as Assigned Surgical Provider    Patient has been advised of split billing requirements and " indicates understanding: Yes    Review of Systems   See HPI       Objective    /78 (BP Location: Right arm, Patient Position: Sitting, Cuff Size: Adult Large)   Pulse 76   Resp 20   Wt 75.6 kg (166 lb 9.6 oz)   SpO2 98%   BMI 25.71 kg/m    Body mass index is 25.71 kg/m .  Physical Exam   Constitutional: healthy, alert, and no distress  Head: Normocephalic. Atraumatic  Eyes: No conjunctival injection, sclera anicteric  Neck: supple, no thyromegaly, nodules or asymmetry of the thyroid. No cervical LAD.  Cardiovascular: RRR. No murmurs, clicks, gallops, or rubs. No peripheral edema.   Respiratory: No resp distress. Lungs CTAB bilaterally.   Musculoskeletal: extremities normal- no gross deformities noted, and normal muscle tone  Skin: no suspicious lesions or rashes  Neurologic: Gait normal. CN 2-12 grossly intact  Psychiatric: mentation appears normal and affect normal/bright

## 2022-03-02 NOTE — PATIENT INSTRUCTIONS
Complete the ultrasound on your aorta an the CT scan. I think this can be done at the same time. Call [phone] to schedule these.     Sleep study has been ordered as well. Complete at your convenience.     Lung Cancer Screening   Frequently Asked Questions  If you are at high-risk for lung cancer, getting screened with low-dose computed tomography (LDCT) every year can help save your life. This handout offers answers to some of the most common questions about lung cancer screening. If you have other questions, please call [phone] ([phone]).     What is it?  Lung cancer screening uses special X-ray technology to create an image of your lung tissue. The exam is quick and easy and takes less than 10 seconds. We don t give you any medicine or use any needles. You can eat before and after the exam. You don t need to change your clothes as long as the clothing on your chest doesn t contain metal. But, you do need to be able to hold your breath for at least 6 seconds during the exam.    What is the goal of lung cancer screening?  The goal of lung cancer screening is to save lives. Many times, lung cancer is not found until a person starts having physical symptoms. Lung cancer screening can help detect lung cancer in the earliest stages when it may be easier to treat.    Who should be screened for lung cancer?  We suggest lung cancer screening for anyone who is at high-risk for lung cancer. You are in the high-risk group if you:      are between the ages of 55 and 79, and    have smoked at least 1 pack of cigarettes a day for 20 or more years, and    still smoke or have quit within the past 15 years.    However, if you have a new cough or shortness of breath, you should talk to your doctor before being screened.    Why does it matter if I have symptoms?  Certain symptoms can be a sign that you have a condition in your lungs that should be checked and treated by your doctor. These symptoms include fever,  chest pain, a new or changing cough, shortness of breath that you have never felt before, coughing up blood or unexplained weight loss. Having any of these symptoms can greatly affect the results of lung cancer screening.       Should all smokers get an LDCT lung cancer screening exam?  It depends. Lung cancer screening is for a very specific group of men and women who have a history of heavy smoking over a long period of time (see  Who should be screened for lung cancer  above).  I am in the high-risk group, but have been diagnosed with cancer in the past. Is LDCT lung cancer screening right for me?  In some cases, you should not have LDCT lung screening, such as when your doctor is already following your cancer with CT scan studies. Your doctor will help you decide if LDCT lung screening is right for you.  Do I need to have a screening exam every year?  Yes. If you are in the high-risk group described earlier, you should get an LDCT lung cancer screening exam every year until you are 79, or are no longer willing or able to undergo screening and possible procedures to diagnose and treat lung cancer.  How effective is LDCT at preventing death from lung cancer?  Studies have shown that LDCT lung cancer screening can lower the risk of death from lung cancer by 20 percent in people who are at high-risk.  What are the risks?  There are some risks and limitations of LDCT lung cancer screening. We want to make sure you understand the risks and benefits, so please let us know if you have any questions. Your doctor may want to talk with you more about these risks.    Radiation exposure: As with any exam that uses radiation, there is a very small increased risk of cancer. The amount of radiation in LDCT is small--about the same amount a person would get from a mammogram. Your doctor orders the exam when he or she feels the potential benefits outweigh the risks.    False negatives: No test is perfect, including LDCT. It is  possible that you may have a medical condition, including lung cancer, that is not found during your exam. This is called a false negative result.    False positives and more testing: LDCT very often finds something in the lung that could be cancer, but in fact is not. This is called a false positive result. False positive tests often cause anxiety. To make sure these findings are not cancer, you may need to have more tests. These tests will be done only if you give us permission. Sometimes patients need a treatment that can have side effects, such as a biopsy. For more information on false positives, see  What can I expect from the results?     Findings not related to lung cancer: Your LDCT exam also takes pictures of areas of your body next to your lungs. In a very small number of cases, the CT scan will show an abnormal finding in one of these areas, such as your kidneys, adrenal glands, liver or thyroid. This finding may not be serious, but you may need more tests. Your doctor can help you decide what other tests you may need, if any.  What can I expect from the results?  About 1 out of 4 LDCT exams will find something that may need more tests. Most of the time, these findings are lung nodules. Lung nodules are very small collections of tissue in the lung. These nodules are very common, and the vast majority--more than 97 percent--are not cancer (benign). Most are normal lymph nodes or small areas of scarring from past infections.  But, if a small lung nodule is found to be cancer, the cancer can be cured more than 90 percent of the time. To know if the nodule is cancer, we may need to get more images before your next yearly screening exam. If the nodule has suspicious features (for example, it is large, has an odd shape or grows over time), we will refer you to a specialist for further testing.  Will my doctor also get the results?  Yes. Your doctor will get a copy of your results.  Patient Education    Personalized Prevention Plan  You are due for the preventive services outlined below.  Your care team is available to assist you in scheduling these services.  If you have already completed any of these items, please share that information with your care team to update in your medical record.  Health Maintenance Due   Topic Date Due     LUNG CANCER SCREENING  Never done     AORTIC ANEURYSM SCREENING (SYSTEM ASSIGNED)  Never done     FALL RISK ASSESSMENT  02/07/2021

## 2022-03-09 ENCOUNTER — HOSPITAL ENCOUNTER (OUTPATIENT)
Dept: ULTRASOUND IMAGING | Facility: CLINIC | Age: 67
End: 2022-03-09
Attending: PHYSICIAN ASSISTANT
Payer: COMMERCIAL

## 2022-03-09 ENCOUNTER — HOSPITAL ENCOUNTER (OUTPATIENT)
Dept: CT IMAGING | Facility: CLINIC | Age: 67
End: 2022-03-09
Attending: PHYSICIAN ASSISTANT
Payer: COMMERCIAL

## 2022-03-09 DIAGNOSIS — Z87.891 PERSONAL HISTORY OF TOBACCO USE: ICD-10-CM

## 2022-03-09 PROCEDURE — 71271 CT THORAX LUNG CANCER SCR C-: CPT

## 2022-03-09 PROCEDURE — 76706 US ABDL AORTA SCREEN AAA: CPT

## 2022-03-10 PROBLEM — I77.811 ABDOMINAL AORTIC ECTASIA (H): Status: ACTIVE | Noted: 2022-03-10

## 2022-03-16 ENCOUNTER — MYC MEDICAL ADVICE (OUTPATIENT)
Dept: FAMILY MEDICINE | Facility: CLINIC | Age: 67
End: 2022-03-16
Payer: COMMERCIAL

## 2022-04-09 ASSESSMENT — SLEEP AND FATIGUE QUESTIONNAIRES
HOW LIKELY ARE YOU TO NOD OFF OR FALL ASLEEP WHILE WATCHING TV: SLIGHT CHANCE OF DOZING
HOW LIKELY ARE YOU TO NOD OFF OR FALL ASLEEP WHILE SITTING QUIETLY AFTER LUNCH WITHOUT ALCOHOL: SLIGHT CHANCE OF DOZING
HOW LIKELY ARE YOU TO NOD OFF OR FALL ASLEEP WHILE SITTING AND TALKING TO SOMEONE: WOULD NEVER DOZE
HOW LIKELY ARE YOU TO NOD OFF OR FALL ASLEEP WHILE SITTING INACTIVE IN A PUBLIC PLACE: WOULD NEVER DOZE
HOW LIKELY ARE YOU TO NOD OFF OR FALL ASLEEP IN A CAR, WHILE STOPPED FOR A FEW MINUTES IN TRAFFIC: WOULD NEVER DOZE
HOW LIKELY ARE YOU TO NOD OFF OR FALL ASLEEP WHILE SITTING AND READING: WOULD NEVER DOZE
HOW LIKELY ARE YOU TO NOD OFF OR FALL ASLEEP WHILE LYING DOWN TO REST IN THE AFTERNOON WHEN CIRCUMSTANCES PERMIT: HIGH CHANCE OF DOZING
HOW LIKELY ARE YOU TO NOD OFF OR FALL ASLEEP WHEN YOU ARE A PASSENGER IN A CAR FOR AN HOUR WITHOUT A BREAK: WOULD NEVER DOZE

## 2022-04-11 NOTE — PROGRESS NOTES
Does Dillon have a CPAP/Bipap?  No     [place] Sleep Scale: 5      Outpatient Sleep Medicine Consultation:  April 11, 2022    Name: Dillon Matias MRN# [id number]   Age: 67 year old YOB: 1955     Date of Consultation: April 11, 2022  Consultation is requested by: Antonio Hernandez PA-C  [address] [place] [id number] Antonio Hernandez  Primary care provider: Antonio Hernandez       Reason for Sleep Consult:     Dillon Matias is sent by Antonio Hernandez for a sleep consultation regarding snoring, apneas.    Patient s Reason for visit  Dillon Matias main reason for visit: snoring  Patient states problem(s) started: 15+ years  Dillon Matias's goals for this visit: explore options           Assessment and Plan:     Summary Sleep Diagnoses:  Snoring, apneas, insomnia.    Comorbid Diagnoses:  none      Summary Recommendations:  Patient with STOP BANG  Score 4 would need further evaluation for high likelihood of obstructive sleep apnea.  We discussed the difference between PSG versus HST.  Patient is interested in full attended sleep study.  We will place order accordingly.  No orders of the defined types were placed in this encounter.        Summary Counseling:    Sleep Testing Reviewed  Obstructive Sleep Apnea Reviewed  Complications of Untreated Sleep Apnea Reviewed      Medical Decision-making:   Educational materials provided in instructions    Total time spent reviewing medical records, history and physical examination, review of previous testing and interpretation as well as documentation on this date: 30 minutes    CC: Antonio Hernandez          History of Present Illness:     Past Sleep Evaluations:    SLEEP-WAKE SCHEDULE:     Work/School Days: Patient goes to school/work: Yes   Usually gets into bed at 9:00 p.m.  Takes patient about 2-3 minutes to fall asleep  Has trouble falling asleep 0 nights per week  Wakes up in the middle of the night 2-5 times.  Wakes up due to Snorting self  awake  He has trouble falling back asleep 0 times a week.   It usually takes 0 to get back to sleep  Patient is usually up at 3:45 a.m.  Uses alarm: Yes    Weekends/Non-work Days/All Other Days:  Usually gets into bed at.  P.m.   Takes patient about 2-3 min. to fall asleep  Patient is usually up at 7:00 a.m.  Uses alarm: No    Sleep Need  Patient gets  5 1/2 - 6 hrs. sleep on average   Patient thinks he needs about 6 hrs. sleep    Dillon Matias prefers to sleep in this position(s): Side, Stomach   Patient states they do the following activities in bed: Watch TV    Naps  Patient takes a purposeful nap 4-5 times a week and naps are usually 20 -25 min. in duration  He feels better after a nap: Yes  He dozes off unintentionally  0  days per week  Patient has had a driving accident or near-miss due to sleepiness/drowsiness: No      SLEEP DISRUPTIONS:    Breathing/Snoring  Patient snores:Yes  Other people complain about his snoring: Yes  Patient has been told he stops breathing in his sleep: Yes  He has issues with the following:      Movement:  Patient gets pain, discomfort, with an urge to move:  Yes  It happens when he is resting:  No  It happens more at night:  Yes  Patient has been told he kicks his legs at night:  No     Behaviors in Sleep:  Dillon Matias has experienced the following behaviors while sleeping: Recurring Nightmares  He has experienced sudden muscle weakness during the day: No      Is there anything else you would like your sleep provider to know:      Patient was adopted and has no knowledge of sleep hx of biological family    CAFFEINE AND OTHER SUBSTANCES:    Patient consumes caffeinated beverages per day:  4-5  Last caffeine use is usually: 7:30 p.m.  List of any prescribed or over the counter stimulants that patient takes:    List of any prescribed or over the counter sleep medication patient takes:    List of previous sleep medications that patient has tried:    Patient drinks alcohol to help  them sleep: No  Patient drinks alcohol near bedtime: No    Family History: ?  Patient has a family member been diagnosed with a sleep disorder:  ?            SCALES:    EPWORTH SLEEPINESS SCALE      [place] Sleepiness Scale ( DILAN Hernandez  [id number]<br>ESS - [place]/English - Final version - 21 Nov 07 - [facility] [place].) 4/9/2022   Sitting and reading Would never doze   Watching TV Slight chance of dozing   Sitting, inactive in a public place (e.g. a theatre or a meeting) Would never doze   As a passenger in a car for an hour without a break Would never doze   Lying down to rest in the afternoon when circumstances permit High chance of dozing   Sitting and talking to someone Would never doze   Sitting quietly after a lunch without alcohol Slight chance of dozing   In a car, while stopped for a few minutes in traffic Would never doze   [place] Score (MC) 0   [place] Score (Sleep) 5         INSOMNIA SEVERITY INDEX (ROCK)      Insomnia Severity Index (ROCK) 4/9/2022   Difficulty falling asleep 0   Difficulty staying asleep 1   Problems waking up too early 1   How SATISFIED/DISSATISFIED are you with your CURRENT sleep pattern? 2   How NOTICEABLE to others do you think your sleep problem is in terms of impairing the quality of your life? 1   How WORRIED/DISTRESSED are you about your current sleep problem? 1   To what extent do you consider your sleep problem to INTERFERE with your daily functioning (e.g. daytime fatigue, mood, ability to function at work/daily chores, concentration, memory, mood, etc.) CURRENTLY? 1   ROCK Total Score 7       Guidelines for Scoring/Interpretation:    Total score categories:  0-7 = No clinically significant insomnia   8-14 = Subthreshold insomnia   15-21 = Clinical insomnia (moderate severity)  22-28 = Clinical insomnia (severe)    Used via courtesy of www.Cellectarealth.va.gov with permission from Jamar Blount PhD., [facility]      STOP BANG     STOP BANG Questionnaire (  2008, the  "American Society of Anesthesiologists, Inc[employer] 4/9/2022   1. Snoring - Do you snore loudly (louder than talking or loud enough to be heard through closed doors)? Yes   2. Tired - Do you often feel tired, fatigued, or sleepy during daytime? No   3. Observed - Has anyone observed you stop breathing during your sleep? Yes   4. Blood pressure - Do you have or are you being treated for high blood pressure? No   5. BMI - BMI more than 35 kg/m2? No   6. Age - Age over 50 yr old? Yes   7. Neck circumference - Neck circumference greater than 40 cm? No   8. Gender - Gender male? Yes   STOP BANG Score (MC): 3 (High risk of EVANGELISTA)   B/P Clinic: -   BMI Clinic: -         GAD7    No flowsheet data found.      CAGE-AID    No flowsheet data found.    CAGE-AID reprinted with permission from the [place] [employer] Journal, EDIS Loyola. and LETTY Winters, \"Conjoint screening questionnaires for alcohol and drug abuse\" [place] Medical Journal 94: 135-140, 1995.      PATIENT HEALTH QUESTIONNAIRE-9 (PHQ - 9)    No flowsheet data found.    Developed by Colin Lott, Kya Barreto, Víctor Mariee and colleagues, with an educational shahid from [employer] No permission required to reproduce, translate, display or distribute.        Allergies:    No Known Allergies    Medications:    Current Outpatient Medications   Medication Sig Dispense Refill     aspirin (ASA) 81 MG chewable tablet Take 1 tablet (81 mg) by mouth daily 90 tablet 3     atorvastatin (LIPITOR) 40 MG tablet Take 1 tablet (40 mg) by mouth daily 90 tablet 3     fluorouracil (EFUDEX) 5 % external cream Apply to face BID x 2 weeks 40 g 3     triamcinolone (KENALOG) 0.1 % external cream Apply to AA on the leg BID x 1-2 week then PRN only (Patient not taking: Reported on 3/2/2022) 454 g 2       Problem List:  Patient Active Problem List    Diagnosis Date Noted     Abdominal aortic ectasia (H) 03/10/2022     Priority: Medium     2022: " Ectasia of the infrarenal abdominal aorta measuring 2.2 x 2.7 cm.       Actinic keratosis 02/07/2020     Priority: Medium     Chronic left shoulder pain 02/07/2020     Priority: Medium     History of tobacco abuse 02/07/2020     Priority: Medium     Other eczema 02/07/2020     Priority: Medium     Coronary artery disease involving native coronary artery of native heart without angina pectoris 06/15/2015     Priority: Medium     Dyslipidemia 06/10/2015     Priority: Medium        Past Medical/Surgical History:  Past Medical History:   Diagnosis Date     Actinic keratosis      Basal cell carcinoma      No past surgical history on file.    Social History:  Social History     Socioeconomic History     Marital status:      Spouse name: Not on file     Number of children: Not on file     Years of education: Not on file     Highest education level: Not on file   Occupational History     Not on file   Tobacco Use     Smoking status: Former Smoker     Years: 40.00     Smokeless tobacco: Never Used   Substance and Sexual Activity     Alcohol use: Not on file     Drug use: Not on file     Sexual activity: Not on file   Other Topics Concern     Not on file   Social History Narrative     Not on file     Social Determinants of Health     Financial Resource Strain: Not on file   Food Insecurity: Not on file   Transportation Needs: Not on file   Physical Activity: Not on file   Stress: Not on file   Social Connections: Not on file   Intimate Partner Violence: Not on file   Housing Stability: Not on file       Family History:  No family history on file.    Review of Systems:  A complete review of systems reviewed by me is negative with the exeption of what has been mentioned in the history of present illness.  In the last TWO WEEKS have you experienced any of the following symptoms?  Fevers: No  Night Sweats: No  Weight Gain: No  Pain at Night: Yes  Double Vision: No  Changes in Vision: No  Difficulty Breathing through Nose:  No  Sore Throat in Morning: No  Dry Mouth in the Morning: No  Shortness of Breath Lying Flat: No  Shortness of Breath With Activity: No  Awakening with Shortness of Breath: No  Increased Cough: No  Heart Racing at Night: No  Swelling in Feet or Legs: No  Diarrhea at Night: No  Heartburn at Night: No  Urinating More than Once at Night: No  Losing Control of Urine at Night: No  Joint Pains at Night: Yes  Headaches in Morning: No  Weakness in Arms or Legs: No  Depressed Mood: No  Anxiety: No     Physical Examination:  Vitals: There were no vitals taken for this visit.  BMI= There is no height or weight on file to calculate BMI.           GENERAL APPEARANCE: healthy, alert, no distress and cooperative  EYES: Eyes grossly normal to inspection, PERRL and conjunctivae and sclerae normal  HENT: ear canals and TM's normal, nose and mouth without ulcers or lesions, oropharynx crowded and soft palate dependent  NECK: no adenopathy, no asymmetry, masses, or scars and thyroid normal to palpation  NEURO: Normal strength and tone, mentation intact and speech normal  PSYCH: mentation appears normal and affect normal/bright  Mallampati Class: III.  Tonsillar Stage: 1  hidden by pillars.         Data: All pertinent previous laboratory data reviewed     No results for input(s): NA, POTASSIUM, CHLORIDE, CO2, ANIONGAP, GLC, BUN, CR, SABRA in the last 23459 hours.    No results for input(s): WBC, RBC, HGB, HCT, MCV, MCH, MCHC, RDW, PLT in the last 28502 hours.    No results for input(s): PROTTOTAL, ALBUMIN, BILITOTAL, ALKPHOS, AST, ALT, BILIDIRECT in the last 42247 hours.    No results found for: TSH    No results found for: UAMP, UBARB, BENZODIAZEUR, UCANN, UCOC, OPIT, UPCP    No results found for: IRONSAT, [id number], MARIANA    No results found for: PH, PHARTERIAL, PO2, PL1HIBVSQCV, SAT, PCO2, HCO3, BASEEXCESS, CONSTANCE, BEB    @LABRCNTIPR(phv:4,pco2v:4,po2v:4,hco3v:4,forest:4,o2per:4)@    Echocardiology: No results found for this or any previous  "visit (from the past 4320 hour(s)).    Chest x-ray: No results found for this or any previous visit from the past 365 days.      Chest CT: CT Chest Lung Cancer Scrn Low Dose wo 03/09/2022    Narrative  CT CHEST LUNG CANCER SCREEN LOW DOSE WITHOUT CONTRAST  3/9/2022 8:35  AM    HISTORY:  Lung cancer screening, >= 30 pk-yr smoking history in last  15 yrs (Age 55-80y). Personal history of tobacco use.    TECHNIQUE:  Scans obtained from the apices through the diaphragm  without IV contrast. Low dose CT chest technique was utilized.  Radiation dose for this scan was reduced using automated exposure  control, adjustment of the mA and/or kV according to patient size, or  iterative reconstruction technique.    COMPARISON:  Chest x-ray dated 2/22/2020.    FINDINGS:  No nodules.    Lungs: Mild emphysema. No effusions.    Additional findings: No adenopathy or pericardial effusion. Severe  coronary artery calcification is present.    Impression  IMPRESSION:  1. ACR Assessment Category:  Lung-RADS Category 1. Negative, continue  annual screening, if clinically relevant (please order exam code IMG  2290).  2. Significant Incidental Finding(s):  Category S: No.    Download the \"LungRADS Assessment Categories\" table at this site:  http://www.acr.org/Quality-Safety/Resources/LungRADS    ZAID DOUGLAS MD      SYSTEM ID:  [id number]      PFT: Most Recent [facility] Pulmonary Function Testing    No results found for: 20001  No results found for: 20002  No results found for: 20003  No results found for: 20015  No results found for: 20016  No results found for: 20027  No results found for: 20028  No results found for: 20029  No results found for: 20079  No results found for: 20080  No results found for: 20081  No results found for: 20335  No results found for: 20105  No results found for: 20053  No results found for: 20054  No results found for: 20055      Mohamud Sorenson CMA 4/11/2022         "

## 2022-04-12 ENCOUNTER — OFFICE VISIT (OUTPATIENT)
Dept: SLEEP MEDICINE | Facility: CLINIC | Age: 67
End: 2022-04-12
Attending: PHYSICIAN ASSISTANT
Payer: COMMERCIAL

## 2022-04-12 VITALS
WEIGHT: 166 LBS | SYSTOLIC BLOOD PRESSURE: 128 MMHG | RESPIRATION RATE: 20 BRPM | DIASTOLIC BLOOD PRESSURE: 70 MMHG | BODY MASS INDEX: 25.16 KG/M2 | HEIGHT: 68 IN | TEMPERATURE: 97.6 F

## 2022-04-12 DIAGNOSIS — R06.81 APNEA: Primary | ICD-10-CM

## 2022-04-12 DIAGNOSIS — R06.83 SNORING: ICD-10-CM

## 2022-04-12 DIAGNOSIS — F51.04 PSYCHOPHYSIOLOGICAL INSOMNIA: ICD-10-CM

## 2022-04-12 PROCEDURE — 99203 OFFICE O/P NEW LOW 30 MIN: CPT | Performed by: OTOLARYNGOLOGY

## 2022-04-12 RX ORDER — ZOLPIDEM TARTRATE 5 MG/1
TABLET ORAL
Qty: 1 TABLET | Refills: 0 | Status: SHIPPED | OUTPATIENT
Start: 2022-04-12 | End: 2023-01-20

## 2022-04-13 ENCOUNTER — IMMUNIZATION (OUTPATIENT)
Dept: FAMILY MEDICINE | Facility: CLINIC | Age: 67
End: 2022-04-13
Payer: COMMERCIAL

## 2022-04-13 PROCEDURE — 0054A COVID-19,PF,PFIZER (12+ YRS): CPT

## 2022-04-13 PROCEDURE — 91305 COVID-19,PF,PFIZER (12+ YRS): CPT

## 2022-05-24 ENCOUNTER — TELEPHONE (OUTPATIENT)
Dept: SLEEP MEDICINE | Facility: CLINIC | Age: 67
End: 2022-05-24
Payer: COMMERCIAL

## 2022-05-24 DIAGNOSIS — R06.81 APNEA: ICD-10-CM

## 2022-05-24 DIAGNOSIS — R06.83 SNORING: Primary | ICD-10-CM

## 2022-05-24 NOTE — TELEPHONE ENCOUNTER
Received following email please review and place orders for HST if applicable    Peer to Peer Request    Patient Name:                        Dillon Matias  :                                      1955  MRN:                                      [id number]        Dr. Erazo,    The authorization for procedure (description) PSG DIAGNOSTIC on date of service 22 has been denied. We were unsuccessful in obtaining approval through clinical review. A [id number]-ld-sygu review can be done by calling the insurance/third party authorization vendor with the following information:    Insurance:       [employer]  Auth Vendor:   [employer]  Phone:             [phone]  Due:                ASAP    Clinicals already Provided  Order:                          N/A  Visit Notes:                  KP  Results:                       N/A  Other:                          N/A    Patient ID:       [id number]  Case/Ref #:     [id number]    Patient contact:           N/A  Patient response:        NA  Patient Phone #:         NA    Denial Reason: does not meet medical necessity for in lab, would be approved for HST      If you feel you have additional clinical information that could get this denial overturned, please complete the Peer to Peer.  If you choose not to do the P2P, please let me know as soon as possible so that we can reach out to the patient and advise them of their denial.      Thank you,    Marina WHITNEY  [facility]      Beth Barthel   Financial [facility]  [facility]  [address] [place] [address]  [email or web address]  Office: [phone]    Fax: [phone]  Employed by [facility]

## 2022-06-17 ENCOUNTER — VIRTUAL VISIT (OUTPATIENT)
Dept: FAMILY MEDICINE | Facility: CLINIC | Age: 67
End: 2022-06-17
Payer: COMMERCIAL

## 2022-06-17 DIAGNOSIS — R05.9 COUGH: ICD-10-CM

## 2022-06-17 DIAGNOSIS — J01.90 ACUTE SINUSITIS WITH SYMPTOMS > 10 DAYS: Primary | ICD-10-CM

## 2022-06-17 PROBLEM — I77.811 ABDOMINAL AORTIC ECTASIA (H): Status: RESOLVED | Noted: 2022-03-10 | Resolved: 2022-06-17

## 2022-06-17 PROCEDURE — 99214 OFFICE O/P EST MOD 30 MIN: CPT | Mod: 95 | Performed by: NURSE PRACTITIONER

## 2022-06-17 RX ORDER — BENZONATATE 100 MG/1
100 CAPSULE ORAL 3 TIMES DAILY PRN
Qty: 30 CAPSULE | Refills: 0 | Status: SHIPPED | OUTPATIENT
Start: 2022-06-17 | End: 2023-01-20

## 2022-06-17 ASSESSMENT — ENCOUNTER SYMPTOMS: COUGH: 1

## 2022-06-17 NOTE — PROGRESS NOTES
Dillon is a 67 year old who is being evaluated via a billable video visit.      How would you like to obtain your AVS? MyChart  If the video visit is dropped, the invitation should be resent by: Text to cell phone: [phone] (Mobile)  Will anyone else be joining your video visit? No        Assessment & Plan     Acute sinusitis with symptoms > 10 days  Symptomatic care strategies reviewed  begin  - amoxicillin-clavulanate (AUGMENTIN) 875-125 MG tablet  Dispense: 20 tablet; Refill: 0    Cough  Tesslon TID as needed for cough      Call or return to the clinic with any worsening of symptoms or no resolution. Patient/Parent verbalized understanding and is in agreement. Medication side effects reviewed.   Current Outpatient Medications   Medication Sig Dispense Refill     amoxicillin-clavulanate (AUGMENTIN) 875-125 MG tablet Take 1 tablet by mouth 2 times daily for 10 days 20 tablet 0     aspirin (ASA) 81 MG chewable tablet Take 1 tablet (81 mg) by mouth daily 90 tablet 3     atorvastatin (LIPITOR) 40 MG tablet Take 1 tablet (40 mg) by mouth daily 90 tablet 3     benzonatate (TESSALON) 100 MG capsule Take 1 capsule (100 mg) by mouth 3 times daily as needed for cough 30 capsule 0     fluorouracil (EFUDEX) 5 % external cream Apply to face BID x 2 weeks (Patient not taking: Reported on 6/17/2022) 40 g 3     triamcinolone (KENALOG) 0.1 % external cream Apply to AA on the leg BID x 1-2 week then PRN only (Patient not taking: No sig reported) 454 g 2     zolpidem (AMBIEN) 5 MG tablet Take tablet by mouth 15 minutes prior to sleep, for Sleep Study (Patient not taking: Reported on 6/17/2022) 1 tablet 0     Chart documentation with Dragon Voice recognition Software. Although reviewed after completion, some words and grammatical errors may remain.  As the provider for this telephone/video service, I attest that I introduced myself to the patient, provided my credentials, disclosed my location, and determined that, based on a  review of the patient's chart and/or a discussion with members of the patient's treatment team, a telephone/video visit is an appropriate and effective means of providing this service. The patient and I mutually agree that this visit is appropriate for telephone/video visit as well.    SRIRAM Gray CNP  [facility]    Zaria Carranza is a 67 year old, presenting for the following health issues:  Cough       Cough    History of Present Illness       Reason for visit:  Cough/nasal congestion  Symptom onset:  2 weeks ago grandchildren were sick and wife got sick then he got sick. Allergy meds not working  Generic claritin  Symptoms include:  Nasal congestion/drainage, nagging cough  Symptom intensity:  Moderate  Symptom progression:  Staying the same  Had these symptoms before:  Yes  Has tried/received treatment for these symptoms:  Yes  Previous treatment was successful:  Yes  Prior treatment description:  Antibiotic  What makes it better:  Cold/flu medicineHe consumes 2 sweetened beverage(s) daily.He exercises with enough effort to increase his heart rate 10 to 19 minutes per day.  He exercises with enough effort to increase his heart rate 5 days per week.   He is taking medications regularly.      Pt would like to discuss a cough that started about 2 weeks ago. It seems like he may have a sinus infection and has drainage into his through. He has a runny nose and a wet cough. The mucus is thick cloudy drainage.          has a past medical history of Actinic keratosis and Basal cell carcinoma.      Review of Systems   Respiratory: Positive for cough.       Constitutional, HEENT, cardiovascular, pulmonary, GI, , musculoskeletal, neuro, skin, endocrine and psych systems are negative, except as otherwise noted.      Objective           Vitals:  No vitals were obtained today due to virtual visit.    Physical Exam   GENERAL: Healthy, alert and no distress  Dry cough noted.    RESP: No audible wheeze, denies chest pain or increased work of breathing.    PSYCH: Mentation appears normal, affect normal/bright, judgement and insight intact, normal speech  No further physical exam due to telephone visit            Video-Visit Details    Video Start Time: 755    Type of service:  Video Visit  Not able to connect with video changed to phone visit    Video End Time:808    Originating Location (pt. Location): Home    Distant Location (provider location):  [facility]     Platform used for Video Visit: NICE.  Answers for HPI/ROS submitted by the patient on 6/17/2022  On average, how many sweetened beverages do you drink each day (Examples: soda, juice, sweet tea, etc.  Do NOT count diet or artificially sweetened beverages)?: 2  How many minutes a day do you exercise enough to make your heart beat faster?: 10 to 19  How many days a week do you exercise enough to make your heart beat faster?: 5  How many days per week do you miss taking your medication?: 0  What is the reason for your visit today?: cough/nasal congestion  When did your symptoms begin?: 1-2 weeks ago  What are your symptoms?: nasal congestion/drainage, nagging cough  How would you describe these symptoms?: Moderate  Are your symptoms:: Staying the same  Have you had these symptoms before?: Yes  Have you tried or received treatment for these symptoms before?: Yes  Did that treatment work? : Yes  Please describe the treatment you had:: antibiotic  Is there anything that makes you feel better?: cold/flu medicine

## 2022-08-23 ENCOUNTER — MEDICAL CORRESPONDENCE (OUTPATIENT)
Dept: HEALTH INFORMATION MANAGEMENT | Facility: CLINIC | Age: 67
End: 2022-08-23

## 2022-11-19 ENCOUNTER — HEALTH MAINTENANCE LETTER (OUTPATIENT)
Age: 67
End: 2022-11-19

## 2022-11-28 ENCOUNTER — IMMUNIZATION (OUTPATIENT)
Dept: FAMILY MEDICINE | Facility: CLINIC | Age: 67
End: 2022-11-28
Payer: COMMERCIAL

## 2022-11-28 DIAGNOSIS — Z23 HIGH PRIORITY FOR 2019-NCOV VACCINE: Primary | ICD-10-CM

## 2022-11-28 PROCEDURE — 99207 PR NO CHARGE LOS: CPT

## 2022-11-28 PROCEDURE — 91312 COVID-19 VACCINE BIVALENT BOOSTER 12+ (PFIZER): CPT

## 2022-11-28 PROCEDURE — 0124A COVID-19 VACCINE BIVALENT BOOSTER 12+ (PFIZER): CPT

## 2023-01-13 ASSESSMENT — ENCOUNTER SYMPTOMS
MYALGIAS: 0
PARESTHESIAS: 0
FREQUENCY: 0
WEAKNESS: 0
HEMATOCHEZIA: 0
CONSTIPATION: 0
NERVOUS/ANXIOUS: 0
DYSURIA: 0
SORE THROAT: 0
HEADACHES: 0
HEMATURIA: 0
PALPITATIONS: 0
HEARTBURN: 0
DIARRHEA: 0
ARTHRALGIAS: 0
CHILLS: 0
JOINT SWELLING: 0
EYE PAIN: 0
FEVER: 0
ABDOMINAL PAIN: 0
NAUSEA: 0
SHORTNESS OF BREATH: 0
DIZZINESS: 0
COUGH: 0

## 2023-01-13 ASSESSMENT — ACTIVITIES OF DAILY LIVING (ADL): CURRENT_FUNCTION: NO ASSISTANCE NEEDED

## 2023-01-20 ENCOUNTER — OFFICE VISIT (OUTPATIENT)
Dept: FAMILY MEDICINE | Facility: CLINIC | Age: 68
End: 2023-01-20
Payer: COMMERCIAL

## 2023-01-20 VITALS
BODY MASS INDEX: 25.68 KG/M2 | HEART RATE: 60 BPM | DIASTOLIC BLOOD PRESSURE: 76 MMHG | SYSTOLIC BLOOD PRESSURE: 130 MMHG | RESPIRATION RATE: 18 BRPM | OXYGEN SATURATION: 98 % | TEMPERATURE: 96.7 F | HEIGHT: 67 IN | WEIGHT: 163.6 LBS

## 2023-01-20 DIAGNOSIS — R97.20 ELEVATED PROSTATE SPECIFIC ANTIGEN (PSA): ICD-10-CM

## 2023-01-20 DIAGNOSIS — Z12.5 SCREENING FOR PROSTATE CANCER: ICD-10-CM

## 2023-01-20 DIAGNOSIS — E78.5 DYSLIPIDEMIA: ICD-10-CM

## 2023-01-20 DIAGNOSIS — I25.10 CORONARY ARTERY DISEASE INVOLVING NATIVE CORONARY ARTERY OF NATIVE HEART WITHOUT ANGINA PECTORIS: ICD-10-CM

## 2023-01-20 DIAGNOSIS — Z00.00 ENCOUNTER FOR MEDICARE ANNUAL WELLNESS EXAM: Primary | ICD-10-CM

## 2023-01-20 LAB
ALBUMIN SERPL BCG-MCNC: 4.2 G/DL (ref 3.5–5.2)
ALP SERPL-CCNC: 150 U/L (ref 40–129)
ALT SERPL W P-5'-P-CCNC: 20 U/L (ref 10–50)
ANION GAP SERPL CALCULATED.3IONS-SCNC: 10 MMOL/L (ref 7–15)
AST SERPL W P-5'-P-CCNC: 23 U/L (ref 10–50)
BILIRUB SERPL-MCNC: 0.6 MG/DL
BUN SERPL-MCNC: 17.6 MG/DL (ref 8–23)
CALCIUM SERPL-MCNC: 9.5 MG/DL (ref 8.8–10.2)
CHLORIDE SERPL-SCNC: 104 MMOL/L (ref 98–107)
CHOLEST SERPL-MCNC: 135 MG/DL
CREAT SERPL-MCNC: 1 MG/DL (ref 0.67–1.17)
DEPRECATED HCO3 PLAS-SCNC: 26 MMOL/L (ref 22–29)
GFR SERPL CREATININE-BSD FRML MDRD: 82 ML/MIN/1.73M2
GLUCOSE SERPL-MCNC: 103 MG/DL (ref 70–99)
HDLC SERPL-MCNC: 52 MG/DL
LDLC SERPL CALC-MCNC: 57 MG/DL
NONHDLC SERPL-MCNC: 83 MG/DL
POTASSIUM SERPL-SCNC: 4 MMOL/L (ref 3.4–5.3)
PROT SERPL-MCNC: 6.9 G/DL (ref 6.4–8.3)
PSA SERPL-MCNC: 6.26 NG/ML (ref 0–4.5)
SODIUM SERPL-SCNC: 140 MMOL/L (ref 136–145)
TRIGL SERPL-MCNC: 131 MG/DL

## 2023-01-20 PROCEDURE — 80061 LIPID PANEL: CPT | Performed by: PHYSICIAN ASSISTANT

## 2023-01-20 PROCEDURE — 99213 OFFICE O/P EST LOW 20 MIN: CPT | Mod: 25 | Performed by: PHYSICIAN ASSISTANT

## 2023-01-20 PROCEDURE — 36415 COLL VENOUS BLD VENIPUNCTURE: CPT | Performed by: PHYSICIAN ASSISTANT

## 2023-01-20 PROCEDURE — G0103 PSA SCREENING: HCPCS | Performed by: PHYSICIAN ASSISTANT

## 2023-01-20 PROCEDURE — 99397 PER PM REEVAL EST PAT 65+ YR: CPT | Performed by: PHYSICIAN ASSISTANT

## 2023-01-20 PROCEDURE — 80053 COMPREHEN METABOLIC PANEL: CPT | Performed by: PHYSICIAN ASSISTANT

## 2023-01-20 RX ORDER — ATORVASTATIN CALCIUM 40 MG/1
40 TABLET, FILM COATED ORAL DAILY
Qty: 90 TABLET | Refills: 3 | Status: SHIPPED | OUTPATIENT
Start: 2023-01-20 | End: 2023-02-23

## 2023-01-20 ASSESSMENT — ENCOUNTER SYMPTOMS
SORE THROAT: 0
ARTHRALGIAS: 0
CHILLS: 0
HEADACHES: 0
ABDOMINAL PAIN: 0
JOINT SWELLING: 0
EYE PAIN: 0
FEVER: 0
COUGH: 0
HEMATOCHEZIA: 0
HEMATURIA: 0
FREQUENCY: 0
DIZZINESS: 0
HEARTBURN: 0
SHORTNESS OF BREATH: 0
PALPITATIONS: 0
NERVOUS/ANXIOUS: 0
DYSURIA: 0
WEAKNESS: 0
MYALGIAS: 0
NAUSEA: 0
PARESTHESIAS: 0
CONSTIPATION: 0
DIARRHEA: 0

## 2023-01-20 ASSESSMENT — PAIN SCALES - GENERAL: PAINLEVEL: NO PAIN (0)

## 2023-01-20 ASSESSMENT — ACTIVITIES OF DAILY LIVING (ADL): CURRENT_FUNCTION: NO ASSISTANCE NEEDED

## 2023-01-20 NOTE — PROGRESS NOTES
"SUBJECTIVE:   Dillon is a 67 year old who presents for Preventive Visit.      Patient has been advised of split billing requirements and indicates understanding: Yes     Are you in the first 12 months of your Medicare coverage?  No    Healthy Habits:     In general, how would you rate your overall health?  Good    Frequency of exercise:  2-3 days/week    Duration of exercise:  15-30 minutes    Do you usually eat at least 4 servings of fruit and vegetables a day, include whole grains    & fiber and avoid regularly eating high fat or \"junk\" foods?  No    Taking medications regularly:  Yes    Medication side effects:  Not applicable    Ability to successfully perform activities of daily living:  No assistance needed    Home Safety:  No safety concerns identified    Hearing Impairment:  No hearing concerns    In the past 6 months, have you been bothered by leaking of urine?  No    In general, how would you rate your overall mental or emotional health?  Excellent      PHQ-2 Total Score: 0    Additional concerns today:  No    Have you ever done Advance Care Planning? (For example, a Health Directive, POLST, or a discussion with a medical provider or your loved ones about your wishes): No, advance care planning information given to patient to review.  Patient plans to discuss their wishes with loved ones or provider.      Fall risk  Fallen 2 or more times in the past year?: No  Any fall with injury in the past year?: No    Cognitive Screening   1) Repeat 3 items (Leader, Season, Table)    2) Clock draw: NORMAL  3) 3 item recall: Recalls 2 objects   Results: NORMAL clock, 1-2 items recalled: COGNITIVE IMPAIRMENT LESS LIKELY    Mini-CogTM Copyright SRIDHAR Mckenzie. Licensed by the author for use in [facility]; reprinted with permission (ean@.[facility]). All rights reserved.      Do you have sleep apnea, excessive snoring or daytime drowsiness?: yes    Reviewed and updated as needed this visit by clinical staff   Tobacco  " Allergies  Meds  Problems  Med Hx  Surg Hx  Fam Hx          Reviewed and updated as needed this visit by Provider   Tobacco  Allergies  Meds  Problems  Med Hx  Surg Hx  Fam Hx         Social History     Tobacco Use     Smoking status: Former     Packs/day: 1.00     Years: 45.00     Pack years: 45.00     Types: Cigarettes     Start date: 1970     Quit date: 7/15/2015     Years since quittin.5     Smokeless tobacco: Never   Substance Use Topics     Alcohol use: Not Currently     If you drink alcohol do you typically have >3 drinks per day or >7 drinks per week? No    No flowsheet data found.      Current providers sharing in care for this patient include:   Patient Care Team:  Antonio Hernandez PA-C as PCP - General (Physician Assistant)  Antonio Hernandez PA-C as Assigned PCP  Mitch Erazo MD as Assigned Surgical Provider    The following health maintenance items are reviewed in Epic and correct as of today:  Health Maintenance   Topic Date Due     LUNG CANCER SCREENING  2023     ANNUAL REVIEW OF HM ORDERS  2023     DTAP/TDAP/TD IMMUNIZATION (4 - Td or Tdap) 2023     MEDICARE ANNUAL WELLNESS VISIT  2024     FALL RISK ASSESSMENT  2024     LIPID  [id number]     ADVANCE CARE PLANNING  2028     COLORECTAL CANCER SCREENING  2028     HEPATITIS C SCREENING  Completed     PHQ-2 (once per calendar year)  Completed     INFLUENZA VACCINE  Completed     Pneumococcal Vaccine: 65+ Years  Completed     ZOSTER IMMUNIZATION  Completed     AORTIC ANEURYSM SCREENING (SYSTEM ASSIGNED)  Completed     COVID-19 Vaccine  Completed     IPV IMMUNIZATION  Aged Out     MENINGITIS IMMUNIZATION  Aged Out     Review of Systems   Constitutional: Negative for chills and fever.   HENT: Negative for congestion, ear pain, hearing loss and sore throat.    Eyes: Negative for pain and visual disturbance.   Respiratory: Negative for cough and shortness of breath.    Cardiovascular:  "Negative for chest pain, palpitations and peripheral edema.   Gastrointestinal: Negative for abdominal pain, constipation, diarrhea, heartburn, hematochezia and nausea.   Genitourinary: Positive for impotence. Negative for dysuria, frequency, genital sores, hematuria, penile discharge and urgency.   Musculoskeletal: Negative for arthralgias, joint swelling and myalgias.   Skin: Negative for rash.   Neurological: Negative for dizziness, weakness, headaches and paresthesias.   Psychiatric/Behavioral: Negative for mood changes. The patient is not nervous/anxious.        OBJECTIVE:   /76 (BP Location: Right arm, Patient Position: Sitting, Cuff Size: Adult Regular)   Pulse 60   Temp (!) 96.7  F (35.9  C) (Tympanic)   Resp 18   Ht 1.695 m (5' 6.75\")   Wt 74.2 kg (163 lb 9.6 oz)   SpO2 98%   BMI 25.82 kg/m   Estimated body mass index is 25.82 kg/m  as calculated from the following:    Height as of this encounter: 1.695 m (5' 6.75\").    Weight as of this encounter: 74.2 kg (163 lb 9.6 oz).  Physical Exam  GENERAL: healthy, alert and no distress  NECK: no adenopathy, no asymmetry, masses, or scars and thyroid normal to palpation  RESP: lungs clear to auscultation - no rales, rhonchi or wheezes  CV: regular rate and rhythm, normal S1 S2, no S3 or S4, no murmur, click or rub, no peripheral edema and peripheral pulses strong  ABDOMEN: soft, nontender, no hepatosplenomegaly, no masses and bowel sounds normal  MS: no gross musculoskeletal defects noted, no edema    Diagnostic Test Results:  Labs reviewed in Epic    ASSESSMENT / PLAN:   Encounter for Medicare annual wellness exam  67 yr old here for Medicare Wellness exam. Last MWE done 1 year(s) ago. Discussed preventative screenings which are updated below. Counseled on immunizations and healthy lifestyle. Follow-up in 1 year for repeat physical exam.     Coronary artery disease involving native coronary artery of native heart without angina " "pectoris  Dyslipidemia  Stable without cardiac symptoms. Continue Lipitor and ASA. Recheck Lipids and CMP.   - Lipid panel reflex to direct LDL Fasting; Future  - Comprehensive metabolic panel; Future  - atorvastatin (LIPITOR) 40 MG tablet; Take 1 tablet (40 mg) by mouth daily  - Comprehensive metabolic panel  - Lipid panel reflex to direct LDL Fasting    Screening for prostate cancer  Due for screening.   - PSA, screen; Future    Patient has been advised of split billing requirements and indicates understanding: Yes    COUNSELING:  Reviewed preventive health counseling, as reflected in patient instructions    BMI:   Estimated body mass index is 25.82 kg/m  as calculated from the following:    Height as of this encounter: 1.695 m (5' 6.75\").    Weight as of this encounter: 74.2 kg (163 lb 9.6 oz).         He reports that he quit smoking about 7 years ago. His smoking use included cigarettes. He started smoking about 52 years ago. He has a 45.00 pack-year smoking history. He has never used smokeless tobacco.      Appropriate preventive services were discussed with this patient, including applicable screening as appropriate for cardiovascular disease, diabetes, osteopenia/osteoporosis, and glaucoma.  As appropriate for age/gender, discussed screening for colorectal cancer, prostate cancer, breast cancer, and cervical cancer. Checklist reviewing preventive services available has been given to the patient.    Reviewed patients plan of care and provided an AVS. The Basic Care Plan (routine screening as documented in Health Maintenance) for Dillon meets the Care Plan requirement. This Care Plan has been established and reviewed with the Patient.    Antonio Hernandez PA-C  [facility]    Identified Health Risks:  "

## 2023-01-20 NOTE — PATIENT INSTRUCTIONS
Patient Education   Personalized Prevention Plan  You are due for the preventive services outlined below.  Your care team is available to assist you in scheduling these services.  If you have already completed any of these items, please share that information with your care team to update in your medical record.  Health Maintenance Due   Topic Date Due     LUNG CANCER SCREENING  03/09/2023       Understanding USDA MyPlate  The USDA has guidelines to help you make healthy food choices. These are called MyPlate. MyPlate shows the food groups that make up healthy meals using the image of a place setting. Before you eat, think about the healthiest choices for what to put on your plate or in your cup or bowl. To learn more about building a healthy plate, visit www.choosemyplate.gov.    The food groups    Fruits. Any fruit or 100% fruit juice counts as part of the Fruit Group. Fruits may be fresh, canned, frozen, or dried, and may be whole, cut-up, or pureed. Make 1/2 of your plate fruits and vegetables.    Vegetables. Any vegetable or 100% vegetable juice counts as a member of the Vegetable Group. Vegetables may be fresh, frozen, canned, or dried. They can be served raw or cooked and may be whole, cut-up, or mashed. Make 1/2 of your plate fruits and vegetables.    Grains. All foods made from grains are part of the Grains Group. These include wheat, rice, oats, cornmeal, and barley. Grains are often used to make foods such as bread, pasta, oatmeal, cereal, tortillas, and grits. Grains should be no more than 1/4 of your plate. At least half of your grains should be whole grains.    Protein. This group includes meat, poultry, seafood, beans and peas, eggs, processed soy products (such as tofu), nuts (including nut butters), and seeds. Make protein choices no more than 1/4 of your plate. Meat and poultry choices should be lean or low fat.    Dairy. The Dairy Group includes all fluid milk products and foods made from milk  that contain calcium, such as yogurt and cheese. (Foods that have little calcium, such as cream, butter, and cream cheese, are not part of this group.) Most dairy choices should be low-fat or fat-free.    Oils. Oils aren't a food group, but they do contain essential nutrients. However it's important to watch your intake of oils. These are fats that are liquid at room temperature. They include canola, corn, olive, soybean, vegetable, and sunflower oil. Foods that are mainly oil include mayonnaise, certain salad dressings, and soft margarines. You likely already get your daily oil allowance from the foods you eat.  Things to limit  Eating healthy also means limiting these things in your diet:       Salt (sodium). Many processed foods have a lot of sodium. To keep sodium intake down, eat fresh vegetables, meats, poultry, and seafood when possible. Purchase low-sodium, reduced-sodium, or no-salt-added food products at the store. And don't add salt to your meals at home. Instead, season them with herbs and spices such as dill, oregano, cumin, and paprika. Or try adding flavor with lemon or lime zest and juice.    Saturated fat. Saturated fats are most often found in animal products such as beef, pork, and chicken. They are often solid at room temperature, such as butter. To reduce your saturated fat intake, choose leaner cuts of meat and poultry. And try healthier cooking methods such as grilling, broiling, roasting, or baking. For a simple lower-fat swap, use plain nonfat yogurt instead of mayonnaise when making potato salad or macaroni salad.    Added sugars. These are sugars added to foods. They are in foods such as ice cream, candy, soda, fruit drinks, sports drinks, energy drinks, cookies, pastries, jams, and syrups. Cut down on added sugars by sharing sweet treats with a family member or friend. You can also choose fruit for dessert, and drink water or other unsweetened beverages.     Jean Pierre last reviewed this  educational content on 6/1/2020 2000-2021 [employer]. All rights reserved. This information is not intended as a substitute for professional medical care. Always follow your healthcare professional's instructions.

## 2023-02-17 ENCOUNTER — TELEPHONE (OUTPATIENT)
Dept: FAMILY MEDICINE | Facility: CLINIC | Age: 68
End: 2023-02-17
Payer: COMMERCIAL

## 2023-02-17 DIAGNOSIS — Z87.891 PERSONAL HISTORY OF TOBACCO USE: Primary | ICD-10-CM

## 2023-02-17 NOTE — PATIENT INSTRUCTIONS
Lung Cancer Screening   Frequently Asked Questions  If you are at high-risk for lung cancer, getting screened with low-dose computed tomography (LDCT) every year can help save your life. This handout offers answers to some of the most common questions about lung cancer screening. If you have other questions, please call [phone] ([phone]).     What is it?  Lung cancer screening uses special X-ray technology to create an image of your lung tissue. The exam is quick and easy and takes less than 10 seconds. We don t give you any medicine or use any needles. You can eat before and after the exam. You don t need to change your clothes as long as the clothing on your chest doesn t contain metal. But, you do need to be able to hold your breath for at least 6 seconds during the exam.    What is the goal of lung cancer screening?  The goal of lung cancer screening is to save lives. Many times, lung cancer is not found until a person starts having physical symptoms. Lung cancer screening can help detect lung cancer in the earliest stages when it may be easier to treat.    Who should be screened for lung cancer?  We suggest lung cancer screening for anyone who is at high-risk for lung cancer. You are in the high-risk group if you:      are between the ages of 55 and 79, and    have smoked at least 1 pack of cigarettes a day for 20 or more years, and    still smoke or have quit within the past 15 years.    However, if you have a new cough or shortness of breath, you should talk to your doctor before being screened.    Why does it matter if I have symptoms?  Certain symptoms can be a sign that you have a condition in your lungs that should be checked and treated by your doctor. These symptoms include fever, chest pain, a new or changing cough, shortness of breath that you have never felt before, coughing up blood or unexplained weight loss. Having any of these symptoms can greatly affect the results of lung  cancer screening.       Should all smokers get an LDCT lung cancer screening exam?  It depends. Lung cancer screening is for a very specific group of men and women who have a history of heavy smoking over a long period of time (see  Who should be screened for lung cancer  above).  I am in the high-risk group, but have been diagnosed with cancer in the past. Is LDCT lung cancer screening right for me?  In some cases, you should not have LDCT lung screening, such as when your doctor is already following your cancer with CT scan studies. Your doctor will help you decide if LDCT lung screening is right for you.  Do I need to have a screening exam every year?  Yes. If you are in the high-risk group described earlier, you should get an LDCT lung cancer screening exam every year until you are 79, or are no longer willing or able to undergo screening and possible procedures to diagnose and treat lung cancer.  How effective is LDCT at preventing death from lung cancer?  Studies have shown that LDCT lung cancer screening can lower the risk of death from lung cancer by 20 percent in people who are at high-risk.  What are the risks?  There are some risks and limitations of LDCT lung cancer screening. We want to make sure you understand the risks and benefits, so please let us know if you have any questions. Your doctor may want to talk with you more about these risks.    Radiation exposure: As with any exam that uses radiation, there is a very small increased risk of cancer. The amount of radiation in LDCT is small--about the same amount a person would get from a mammogram. Your doctor orders the exam when he or she feels the potential benefits outweigh the risks.    False negatives: No test is perfect, including LDCT. It is possible that you may have a medical condition, including lung cancer, that is not found during your exam. This is called a false negative result.    False positives and more testing: LDCT very often finds  something in the lung that could be cancer, but in fact is not. This is called a false positive result. False positive tests often cause anxiety. To make sure these findings are not cancer, you may need to have more tests. These tests will be done only if you give us permission. Sometimes patients need a treatment that can have side effects, such as a biopsy. For more information on false positives, see  What can I expect from the results?     Findings not related to lung cancer: Your LDCT exam also takes pictures of areas of your body next to your lungs. In a very small number of cases, the CT scan will show an abnormal finding in one of these areas, such as your kidneys, adrenal glands, liver or thyroid. This finding may not be serious, but you may need more tests. Your doctor can help you decide what other tests you may need, if any.  What can I expect from the results?  About 1 out of 4 LDCT exams will find something that may need more tests. Most of the time, these findings are lung nodules. Lung nodules are very small collections of tissue in the lung. These nodules are very common, and the vast majority--more than 97 percent--are not cancer (benign). Most are normal lymph nodes or small areas of scarring from past infections.  But, if a small lung nodule is found to be cancer, the cancer can be cured more than 90 percent of the time. To know if the nodule is cancer, we may need to get more images before your next yearly screening exam. If the nodule has suspicious features (for example, it is large, has an odd shape or grows over time), we will refer you to a specialist for further testing.  Will my doctor also get the results?  Yes. Your doctor will get a copy of your results.  Is it okay to keep smoking now that there s a cancer screening exam?  No. Tobacco is one of the strongest cancer-causing agents. It causes not only lung cancer, but other cancers and cardiovascular (heart) diseases as well. The damage  caused by smoking builds over time. This means that the longer you smoke, the higher your risk of disease. While it is never too late to quit, the sooner you quit, the better.  Where can I find help to quit smoking?  The best way to prevent lung cancer is to stop smoking. If you have already quit smoking, congratulations and keep it up! For help on quitting smoking, please call [employer] at 9-925-QUITNOW (1-565.763.1838) or the American [employer] Society at 1-768.102.2113 to find local resources near you.  One-on-one health coaching:  If you d prefer to work individually with a health care provider on tobacco cessation, we offer:      Medication Therapy Management:  Our specially trained pharmacists work closely with you and your doctor to help you quit smoking.  Call [phone] or [phone] (toll free).

## 2023-02-17 NOTE — TELEPHONE ENCOUNTER
Lung Cancer Screening Shared Decision Making Visit     Dillon Matias, a 68 year old male, is eligible for lung cancer screening    History   Smoking Status     Former     Packs/day: 1.00     Years: 45.00     Types: Cigarettes     Start date: 6/1/1970     Quit date: 7/15/2015   Smokeless Tobacco     Never       I have discussed with patient the risks and benefits of screening for lung cancer with low-dose CT.     The risks include:    radiation exposure: one low dose chest CT has as much ionizing radiation as about 15 chest x-rays, or 6 months of background radiation living in [place]      false positives: most findings/nodules are NOT cancer, but some might still require additional diagnostic evaluation, including biopsy    over-diagnosis: some slow growing cancers that might never have been clinically significant will be detected and treated unnecessarily     The benefit of early detection of lung cancer is contingent upon adherence to annual screening or more frequent follow up if indicated.     Furthermore, to benefit from screening, Dillon must be willing and able to undergo diagnostic procedures, if indicated. Although no specific guide is available for determining severity of comorbidities, it is reasonable to withhold screening in patients who have greater mortality risk from other diseases.     We did discuss that the best way to prevent lung cancer is to not smoke.    Some patients may value a numeric estimation of lung cancer risk when evaluating if lung cancer screening is right for them, here is one calculator:    ShouldIScreen

## 2023-02-17 NOTE — TELEPHONE ENCOUNTER
Order/Referral Request    Who is requesting: Pt    Orders being requested: Lung Cancer Screening    Reason service is needed/diagnosis: Pt stated at his last appt with [facility] they discussed him getting a lung cancer screening done, pt tried scheduling this but they could not find an order for this. Wondering if [facility] can order this.      Has this been discussed with Provider: Yes    Does patient have a preference on a Group/Provider/Facility? [facility]      Could we send this information to you in [email or web address] or would you prefer to receive a phone call?:   Patient would prefer a phone call   Okay to leave a detailed message?: Yes at Cell number on file:    Telephone Information:   Mobile [phone]

## 2023-02-19 ENCOUNTER — HOSPITAL ENCOUNTER (OUTPATIENT)
Dept: CT IMAGING | Facility: CLINIC | Age: 68
Discharge: HOME | End: 2023-02-19
Attending: PHYSICIAN ASSISTANT | Admitting: PHYSICIAN ASSISTANT
Payer: COMMERCIAL

## 2023-02-19 DIAGNOSIS — Z87.891 PERSONAL HISTORY OF TOBACCO USE: ICD-10-CM

## 2023-02-19 PROCEDURE — 71271 CT THORAX LUNG CANCER SCR C-: CPT

## 2023-02-20 ENCOUNTER — OFFICE VISIT (OUTPATIENT)
Dept: DERMATOLOGY | Facility: CLINIC | Age: 68
End: 2023-02-20
Payer: COMMERCIAL

## 2023-02-20 ENCOUNTER — MYC MEDICAL ADVICE (OUTPATIENT)
Dept: DERMATOLOGY | Facility: CLINIC | Age: 68
End: 2023-02-20

## 2023-02-20 ENCOUNTER — TELEPHONE (OUTPATIENT)
Dept: DERMATOLOGY | Facility: CLINIC | Age: 68
End: 2023-02-20

## 2023-02-20 ENCOUNTER — TELEPHONE (OUTPATIENT)
Dept: FAMILY MEDICINE | Facility: CLINIC | Age: 68
End: 2023-02-20

## 2023-02-20 DIAGNOSIS — L82.1 SEBORRHEIC KERATOSIS: ICD-10-CM

## 2023-02-20 DIAGNOSIS — L81.4 LENTIGO: ICD-10-CM

## 2023-02-20 DIAGNOSIS — L85.3 XEROSIS CUTIS: ICD-10-CM

## 2023-02-20 DIAGNOSIS — Z85.828 HISTORY OF BASAL CELL CARCINOMA (BCC): ICD-10-CM

## 2023-02-20 DIAGNOSIS — I25.10 CORONARY ARTERY DISEASE INVOLVING NATIVE CORONARY ARTERY OF NATIVE HEART WITHOUT ANGINA PECTORIS: ICD-10-CM

## 2023-02-20 DIAGNOSIS — D18.01 ANGIOMA OF SKIN: ICD-10-CM

## 2023-02-20 DIAGNOSIS — L57.0 ACTINIC KERATOSIS: ICD-10-CM

## 2023-02-20 DIAGNOSIS — E78.5 DYSLIPIDEMIA: ICD-10-CM

## 2023-02-20 DIAGNOSIS — L82.0 INFLAMED SEBORRHEIC KERATOSIS: Primary | ICD-10-CM

## 2023-02-20 DIAGNOSIS — D48.5 NEOPLASM OF UNCERTAIN BEHAVIOR OF SKIN: Primary | ICD-10-CM

## 2023-02-20 DIAGNOSIS — D22.9 NEVUS: ICD-10-CM

## 2023-02-20 PROCEDURE — 99213 OFFICE O/P EST LOW 20 MIN: CPT | Mod: 25 | Performed by: PHYSICIAN ASSISTANT

## 2023-02-20 PROCEDURE — 88331 PATH CONSLTJ SURG 1 BLK 1SPC: CPT | Performed by: DERMATOLOGY

## 2023-02-20 PROCEDURE — 11102 TANGNTL BX SKIN SINGLE LES: CPT | Performed by: PHYSICIAN ASSISTANT

## 2023-02-20 ASSESSMENT — PAIN SCALES - GENERAL: PAINLEVEL: NO PAIN (0)

## 2023-02-20 NOTE — PROGRESS NOTES
R forehead:Sharply demarcated exophytic epidermal growth composed of sheets of small cells basaloid cells with variable melanin pigmentation.  There are keratin-filled cysts throughout.  The dermis is overall unremarkable with a bland monomorphic inflammatory infiltrate.        R forehead inflamed seborrheic keratosis No further treatment

## 2023-02-20 NOTE — TELEPHONE ENCOUNTER
Incoming call to let us know of significant incidental finding for severe coronary artery calcifications on CT chest  Jade Mar RN

## 2023-02-20 NOTE — TELEPHONE ENCOUNTER
----- Message from Herrera Paul MD sent at 2/20/2023 12:51 PM CST -----  R forehead inflamed seborrheic keratosis No further treatment

## 2023-02-20 NOTE — TELEPHONE ENCOUNTER
Called patient. No answer. Left message to call back. Clinic number was provided.   Ashley Spivey LPN

## 2023-02-20 NOTE — PATIENT INSTRUCTIONS
Wound Care Instructions     FOR SUPERFICIAL WOUNDS     [facility]  [phone]                 AFTER 24 HOURS YOU SHOULD REMOVE THE BANDAGE AND BEGIN DAILY DRESSING CHANGES AS FOLLOWS:     1) Remove Dressing.     2) Clean and dry the area with tap water using a Q-tip or sterile gauze pad.     3) Apply Vaseline, Aquaphor, Polysporin ointment or Bacitracin ointment over entire wound.  Do NOT use Neosporin ointment.     4) Cover the wound with a band-aid, or a sterile non-stick gauze pad and micropore paper tape    REPEAT THESE INSTRUCTIONS AT LEAST ONCE A DAY UNTIL THE WOUND HAS COMPLETELY HEALED.    It is an old wives tale that a wound heals better when it is exposed to air and allowed to dry out. The wound will heal faster with a better cosmetic result if it is kept moist with ointment and covered with a bandage.    **Do not let the wound dry out.**    Supplies Needed:      *Cotton tipped applicators (Q-tips)    *Vaseline, Aquaphor, Polysporin or Bacitracin Ointment (NOT NEOSPORIN)    *Band-aids or non-stick gauze pads and micropore paper tape.      PATIENT INFORMATION:    During the healing process you will notice a number of changes. All wounds develop a small halo of redness surrounding the wound.  This means healing is occurring. Severe itching with extensive redness usually indicates sensitivity to the ointment or bandage tape used to dress the wound.  You should call our office if this develops.      Swelling  and/or discoloration around your surgical site is common, particularly when performed around the eye.    All wounds normally drain.  The larger the wound the more drainage there will be.  After 7-10 days, you will notice the wound beginning to shrink and new skin will begin to grow.  The wound is healed when you can see skin has formed over the entire area.  A healed wound has a healthy, shiny look to the surface and is red to dark pink in color to normalize.  Wounds may  take approximately 4-6 weeks to heal.  Larger wounds may take 6-8 weeks.  After the wound is healed you may discontinue dressing changes.    You may experience a sensation of tightness as your wound heals. This is normal and will gradually subside.    Your healed wound may be sensitive to temperature changes. This sensitivity improves with time, but if you re having a lot of discomfort, try to avoid temperature extremes.    Patients frequently experience itching after their wound appears to have healed because of the continue healing under the skin.  Plain Vaseline will help relieve the itching.      POSSIBLE COMPLICATIONS    BLEEDING:    Leave the bandage in place.  Use tightly rolled up gauze or a cloth to apply direct pressure over the bandage for 30  minutes.  Reapply pressure for an additional 30 minutes if necessary  Use additional gauze and tape to maintain pressure once the bleeding has stopped.

## 2023-02-20 NOTE — TELEPHONE ENCOUNTER
Reason for Call:  Other returning call    Detailed comments: Patient is returning call. He suggested reaching him by [email or web address].     Phone Number Patient can be reached at: Home number on file [phone] (home)    Best Time: anytime    Can we leave a detailed message on this number? YES     Thank you,  Romy Carr  Specialty Clinic -   [facility]      Call taken on 2/20/2023 at 2:50 PM by Romy Carr

## 2023-02-20 NOTE — LETTER
2/20/2023         RE: Dillon Matias  [address] [address] [id number]        Dear Colleague,    Thank you for referring your patient, Dillon Matias, to the [facility]. Please see a copy of my visit note below.    HPI:   Chief complaints: Dillon Matias is a 68 year old male who presents for Full skin cancer screening to rule out skin cancer   Last Skin Exam: 1 year ago     1st Baseline: no  Personal HX of Skin Cancer: yes BCC on the left superior shoulder 2021  Personal HX of Malignant Melanoma:no   Family HX of Skin Cancer / Malignant Melanoma:   Personal HX of Atypical Moles:   no  Risk factors: history of frequent sun exposure and burns in the past with limited sunscreen use  New / Changing lesions: Yes red spot on the right forehead  Social History: lives in [place]; just retired a few months ago!  On review of systems, there are no further skin complaints, patient is feeling otherwise well.  See patient intake sheet.  ROS of the following were done and are negative: Constitutional, Eyes, Ears, Nose,   Mouth, Throat, Cardiovascular, Respiratory, GI, Genitourinary, Musculoskeletal,   Psychiatric, Endocrine, Allergic/Immunologic.    PHYSICAL EXAM:   There were no vitals taken for this visit.  Skin exam performed as follows: Type 2 skin. Mood appropriate  Alert and Oriented X 3. Well developed, well nourished in no distress.  General appearance: Normal  Head including face: Normal  Eyes: conjunctiva and lids: Normal  Mouth: Lips, teeth, gums: Normal  Neck: Normal  Chest-breast/axillae: Normal  Back: Normal  Spleen and liver: Normal  Cardiovascular: Exam of peripheral vascular system by observation for swelling, varicosities, edema: Normal  Genitalia: groin, buttocks: Normal  Extremities: digits/nails (clubbing): Normal  Eccrine and Apocrine glands: Normal  Right upper extremity: Normal  Left upper extremity: Normal  Right lower extremity: Normal  Left lower extremity:  Normal  Skin: Scalp and body hair: See below    Pt deferred exam of breasts, groin, buttocks: No    Other physical findings:  1. Multiple pigmented macules on extremities and trunk  2. Multiple pigmented macules on face, trunk and extremities  3. Multiple vascular papules on trunk, arms and legs  4. Multiple scattered keratotic plaques  5. Pink gritty papules on the vertex scalp, face > 15 in number  6. 6 mm pink papule on the right forehead  7. Xerosis on the back, extremities       Except as noted above, no other signs of skin cancer or melanoma.     ASSESSMENT/PLAN:   Benign Full skin cancer screening today. . Patient with history of BCC  Advised on monthly self exams and 1 year  Patient Education: Appropriate brochures given.    1. Multiple benign appearing nevi on arms, legs and trunk. Discussed ABCDEs of melanoma and sunscreen.   2. Multiple lentigos on arms, legs and trunk. Advised benign, no treatment needed.  3. Multiple scattered angiomas. Advised benign, no treatment needed.   4. Seborrheic keratosis on arms, legs and trunk. Advised benign, no treatment needed.  5. Numerous actinic keratoses on the face and vertex scalp - will plan on PDT for this  6. R/o SCC on the right forehead. Shave biopsy performed.  Area cleaned and anesthetized with 1% lidocaine with epinephrine.  Dermablade used to remove the lesion and sent to pathology. Bleeding was cauterized. Pt tolerated procedure well with no complications.   7. Xerosis - discussed using regular emollients daily                Follow-up: yearly FSE/PRN sooner    1.) Patient was asked about new and changing moles. YES  2.) Patient received a complete physical skin examination: YES  3.) Patient was counseled to perform a monthly self skin examination: YES  Scribed By: Eduarda Gaytan, MS, PACASANDRA          Again, thank you for allowing me to participate in the care of your patient.        Sincerely,        Eduarda Gaytan PA-C

## 2023-02-20 NOTE — PROGRESS NOTES
HPI:   Chief complaints: Dillon Matias is a 68 year old male who presents for Full skin cancer screening to rule out skin cancer   Last Skin Exam: 1 year ago     1st Baseline: no  Personal HX of Skin Cancer: yes BCC on the left superior shoulder 2021  Personal HX of Malignant Melanoma:no   Family HX of Skin Cancer / Malignant Melanoma:   Personal HX of Atypical Moles:   no  Risk factors: history of frequent sun exposure and burns in the past with limited sunscreen use  New / Changing lesions: Yes red spot on the right forehead  Social History: lives in [place]; just retired a few months ago!  On review of systems, there are no further skin complaints, patient is feeling otherwise well.  See patient intake sheet.  ROS of the following were done and are negative: Constitutional, Eyes, Ears, Nose,   Mouth, Throat, Cardiovascular, Respiratory, GI, Genitourinary, Musculoskeletal,   Psychiatric, Endocrine, Allergic/Immunologic.    PHYSICAL EXAM:   There were no vitals taken for this visit.  Skin exam performed as follows: Type 2 skin. Mood appropriate  Alert and Oriented X 3. Well developed, well nourished in no distress.  General appearance: Normal  Head including face: Normal  Eyes: conjunctiva and lids: Normal  Mouth: Lips, teeth, gums: Normal  Neck: Normal  Chest-breast/axillae: Normal  Back: Normal  Spleen and liver: Normal  Cardiovascular: Exam of peripheral vascular system by observation for swelling, varicosities, edema: Normal  Genitalia: groin, buttocks: Normal  Extremities: digits/nails (clubbing): Normal  Eccrine and Apocrine glands: Normal  Right upper extremity: Normal  Left upper extremity: Normal  Right lower extremity: Normal  Left lower extremity: Normal  Skin: Scalp and body hair: See below    Pt deferred exam of breasts, groin, buttocks: No    Other physical findings:  1. Multiple pigmented macules on extremities and trunk  2. Multiple pigmented macules on face, trunk and extremities  3. Multiple  vascular papules on trunk, arms and legs  4. Multiple scattered keratotic plaques  5. Pink gritty papules on the vertex scalp, face > 15 in number  6. 6 mm pink papule on the right forehead  7. Xerosis on the back, extremities       Except as noted above, no other signs of skin cancer or melanoma.     ASSESSMENT/PLAN:   Benign Full skin cancer screening today. . Patient with history of BCC  Advised on monthly self exams and 1 year  Patient Education: Appropriate brochures given.    1. Multiple benign appearing nevi on arms, legs and trunk. Discussed ABCDEs of melanoma and sunscreen.   2. Multiple lentigos on arms, legs and trunk. Advised benign, no treatment needed.  3. Multiple scattered angiomas. Advised benign, no treatment needed.   4. Seborrheic keratosis on arms, legs and trunk. Advised benign, no treatment needed.  5. Numerous actinic keratoses on the face and vertex scalp - will plan on PDT for this  6. R/o SCC on the right forehead. Shave biopsy performed.  Area cleaned and anesthetized with 1% lidocaine with epinephrine.  Dermablade used to remove the lesion and sent to pathology. Bleeding was cauterized. Pt tolerated procedure well with no complications.   7. Xerosis - discussed using regular emollients daily                Follow-up: yearly FSE/PRN sooner    1.) Patient was asked about new and changing moles. YES  2.) Patient received a complete physical skin examination: YES  3.) Patient was counseled to perform a monthly self skin examination: YES  Scribed By: Eduarda Gaytan, MS, PA-C

## 2023-02-20 NOTE — LETTER
2/20/2023         RE: Dillon Matias  [address] [address] [address]        Dear Colleague,    Thank you for referring your patient, Dillon Matias, to the [facility]. Please see a copy of my visit note below.    R forehead:Sharply demarcated exophytic epidermal growth composed of sheets of small cells basaloid cells with variable melanin pigmentation.  There are keratin-filled cysts throughout.  The dermis is overall unremarkable with a bland monomorphic inflammatory infiltrate.        R forehead inflamed seborrheic keratosis No further treatment        Again, thank you for allowing me to participate in the care of your patient.        Sincerely,        Herrera Paul MD

## 2023-02-23 RX ORDER — ATORVASTATIN CALCIUM 80 MG/1
80 TABLET, FILM COATED ORAL DAILY
Qty: 90 TABLET | Refills: 3 | Status: SHIPPED | OUTPATIENT
Start: 2023-02-23 | End: 2024-01-29

## 2023-02-23 NOTE — TELEPHONE ENCOUNTER
Called about incidental CT scan with severe CAD. Will increase statin to 80 mg daily. Continue 81 mg ASA. Discussed Calcium score vs Cardiology referral. Pt elected for Cardiology referral.     Antonio Hernandez PA-C

## 2023-03-01 ENCOUNTER — OFFICE VISIT (OUTPATIENT)
Dept: UROLOGY | Facility: CLINIC | Age: 68
End: 2023-03-01
Payer: COMMERCIAL

## 2023-03-01 VITALS — OXYGEN SATURATION: 98 % | HEART RATE: 67 BPM | SYSTOLIC BLOOD PRESSURE: 116 MMHG | DIASTOLIC BLOOD PRESSURE: 74 MMHG

## 2023-03-01 DIAGNOSIS — N40.0 BENIGN PROSTATIC HYPERPLASIA WITHOUT LOWER URINARY TRACT SYMPTOMS: Primary | ICD-10-CM

## 2023-03-01 DIAGNOSIS — R97.20 ELEVATED PROSTATE SPECIFIC ANTIGEN (PSA): ICD-10-CM

## 2023-03-01 LAB
ALBUMIN UR-MCNC: NEGATIVE MG/DL
APPEARANCE UR: CLEAR
BILIRUB UR QL STRIP: NEGATIVE
COLOR UR AUTO: YELLOW
GLUCOSE UR STRIP-MCNC: NEGATIVE MG/DL
HGB UR QL STRIP: NEGATIVE
KETONES UR STRIP-MCNC: NEGATIVE MG/DL
LEUKOCYTE ESTERASE UR QL STRIP: NEGATIVE
NITRATE UR QL: NEGATIVE
PH UR STRIP: 5.5 [PH] (ref 5–7)
RBC #/AREA URNS AUTO: ABNORMAL /HPF
SP GR UR STRIP: >=1.03 (ref 1–1.03)
SQUAMOUS #/AREA URNS AUTO: ABNORMAL /LPF
UROBILINOGEN UR STRIP-ACNC: 0.2 E.U./DL
WBC #/AREA URNS AUTO: ABNORMAL /HPF

## 2023-03-01 PROCEDURE — 51798 US URINE CAPACITY MEASURE: CPT | Performed by: STUDENT IN AN ORGANIZED HEALTH CARE EDUCATION/TRAINING PROGRAM

## 2023-03-01 PROCEDURE — 81001 URINALYSIS AUTO W/SCOPE: CPT | Performed by: STUDENT IN AN ORGANIZED HEALTH CARE EDUCATION/TRAINING PROGRAM

## 2023-03-01 PROCEDURE — 99213 OFFICE O/P EST LOW 20 MIN: CPT | Mod: 25 | Performed by: STUDENT IN AN ORGANIZED HEALTH CARE EDUCATION/TRAINING PROGRAM

## 2023-03-01 PROCEDURE — 87086 URINE CULTURE/COLONY COUNT: CPT | Performed by: STUDENT IN AN ORGANIZED HEALTH CARE EDUCATION/TRAINING PROGRAM

## 2023-03-01 NOTE — PROGRESS NOTES
Chief Complaint:   Elevated PSA         History of Present Illness:   Dillon Matias is a 68 year old male with a history of HLD and CAD who presents for evaluation of elevated PSA.    PSA history:  1/20/2023: 6.26  5/20/2019: 3.8  6/6/2018: 4.0  6/9/2017: 3.2  5/9/2016: 3.4  5/4/2015: 2.1  4/28/2014: 2.0  3/18/2013: 1.9  2/22/2012: 1.7  6/22/2011: 1.3  6/16/2010: 1.6    He has never had a prostate MRI or prostate biopsy. He has no family history of prostate cancer.    He denies urinary symptoms including dysuria, urinary frequency and urgency, nocturia, double voiding, weak stream, needing to strain to void, hesitancy, intermittency, sensation of incomplete bladder emptying.    Denies gross hematuria.         Past Medical History:     Past Medical History:   Diagnosis Date     Actinic keratosis      Basal cell carcinoma      Heart disease 07/2015            Past Surgical History:     Past Surgical History:   Procedure Laterality Date     COLONOSCOPY       ORTHOPEDIC SURGERY  08/2010    RIGHT KNEE            Medications     Current Outpatient Medications   Medication     aspirin (ASA) 81 MG chewable tablet     atorvastatin (LIPITOR) 80 MG tablet     Multiple Vitamin (MULTIVITAMIN ADULT PO)     triamcinolone (KENALOG) 0.1 % external cream     No current facility-administered medications for this visit.            Allergies:   Patient has no known allergies.         Review of Systems:  From intake questionnaire   Negative 14 system review except as noted on HPI, nurse's note.         Physical Exam:   Patient is a 68 year old  male   Vitals: Blood pressure 116/74, pulse 67, SpO2 98 %.  General Appearance Adult: Alert, no acute distress, oriented.  Lungs: Non-labored breathing.  Heart: No obvious jugular venous distension present.  Neuro: Alert, oriented, speech and mentation normal  : VICKY anodular, symmetric    PVR: 12 mL      Labs and Pathology:    I personally reviewed all applicable laboratory data and  went over findings with patient  Significant for:    BMP RESULTS:  Recent Labs   Lab Test 01/20/23  1007      POTASSIUM 4.0   CHLORIDE 104   CO2 26   ANIONGAP 10   *   BUN 17.6   CR 1.00   GFRESTIMATED 82   SABRA 9.5       PSA RESULTS  Prostate Specific Antigen Screen   Date Value Ref Range Status   01/20/2023 6.26 (H) 0.00 - 4.50 ng/mL Final            Assessment and Plan:     Assessment: 68 year old male who presents for evaluation of elevated PSA. We discussed the possible causes of elevated PSA including prostate cancer, prostate enlargement, UTI, voiding symptoms, and ejaculation or bike/motorcycle riding around the time of lab draw.     We discussed the evaluation of elevated PSA, including prostate MRI and subsequent prostate biopsy, if indicated. VICKY exam today was normal. We discussed proceeding with prostate MRI now vs rechecking the PSA in 3 months from the last check. The patient would prefer to recheck his PSA.     Plan:  1. UA/UC obtained in clinic to rule out infection as a cause of elevated PSA.   2. Repeat PSA in 2 months. If PSA remains elevated, will order prostate MRI.     MELIZA FLORES PA-C  Department of Urology

## 2023-03-01 NOTE — NURSING NOTE
Active order to obtain bladder scan? Yes   Name of ordering provider:  Tania Garrett  Bladder scan preformed post void Yes.  Bladder scan reveled 12ML  Provider notified?  Yes    Sarai Pond CMA

## 2023-03-01 NOTE — NURSING NOTE
"Initial /74 (BP Location: Right arm, Patient Position: Chair, Cuff Size: Adult Large)   Pulse 67   SpO2 98%  Estimated body mass index is 25.82 kg/m  as calculated from the following:    Height as of 1/20/23: 1.695 m (5' 6.75\").    Weight as of 1/20/23: 74.2 kg (163 lb 9.6 oz). .      Sarai Pond MA on 3/1/2023 at 7:52 AM    "

## 2023-03-02 LAB — BACTERIA UR CULT: NO GROWTH

## 2023-03-13 ENCOUNTER — TELEPHONE (OUTPATIENT)
Dept: SLEEP MEDICINE | Facility: CLINIC | Age: 68
End: 2023-03-13
Payer: COMMERCIAL

## 2023-03-13 DIAGNOSIS — R06.83 SNORING: Primary | ICD-10-CM

## 2023-03-13 DIAGNOSIS — R06.81 APNEA: ICD-10-CM

## 2023-03-13 NOTE — TELEPHONE ENCOUNTER
Order/Referral Request    Who is requesting: Dillon Matias    Orders being requested: HST    Reason service is needed/diagnosis: to diagnose sleeping issue - had consult in 2022 - insurance just would not cover the sleep tests than and since than have changed insurance     When are orders needed by: when you have time    Has this been discussed with Provider: Yes    Does patient have a preference on a Group/Provider/Facility? n/a    Does patient have an appointment scheduled?: No    Where to send orders: Place orders within Epic    Could we send this information to you in [facility] or would you prefer to receive a phone call?:   Patient would prefer a phone call   Okay to leave a detailed message?: Yes at Home number on file [phone] (home)

## 2023-03-13 NOTE — TELEPHONE ENCOUNTER
Pt was seen 4/12/22 for sleep consult. Would you like to have an appointment again or ok to reorder HST now that pt thinks it will be covered by insurance? Janis Gomez, CMA

## 2023-03-21 NOTE — TELEPHONE ENCOUNTER
Patient notified of provider's message as written. Patient verbalized understanding and has no further questions at this time.     Pallavi Galvan RN   [facility]

## 2023-04-13 ENCOUNTER — OFFICE VISIT (OUTPATIENT)
Dept: DERMATOLOGY | Facility: CLINIC | Age: 68
End: 2023-04-13
Payer: COMMERCIAL

## 2023-04-13 DIAGNOSIS — L57.0 ACTINIC KERATOSIS: ICD-10-CM

## 2023-04-13 DIAGNOSIS — B00.9 HSV-1 INFECTION: Primary | ICD-10-CM

## 2023-04-13 PROCEDURE — 96567 PDT DSTR PRMLG LES SKN: CPT | Performed by: PHYSICIAN ASSISTANT

## 2023-04-13 RX ORDER — VALACYCLOVIR HYDROCHLORIDE 1 G/1
TABLET, FILM COATED ORAL
Qty: 4 TABLET | Refills: 0 | Status: SHIPPED | OUTPATIENT
Start: 2023-04-13 | End: 2023-07-10

## 2023-04-13 ASSESSMENT — PAIN SCALES - GENERAL: PAINLEVEL: NO PAIN (0)

## 2023-04-13 NOTE — PATIENT INSTRUCTIONS
Possible side effects  Photosensitivity reactions: Reactions caused by light can show up on the skin where the drug is applied. They usually involve redness and a tingling or burning sensation. For about 2 days after the drug is used, you should take care to not expose treated areas of your face and scalp to light.  Stay out of strong, direct light.   Stay indoors as much as possible.   Wear protective clothing and wide-brimmed hats to avoid sunlight when outdoors.   Avoid beaches, snow, light colored concrete, or other surfaces where strong light may be reflected.  Sunscreens will not protect the skin from photosensitivity reactions.  Skin changes: The treated skin will likely turn red and may swell after treatment. Maybe mild or severe. Can use solaracine, aquaphor or OTC hydrocortisone to help with the irritation after treatment. Can use cool compresses if uncomfortable. If needed you can take tylenol or ibuprofen to help with your discomfort, as long as these medications are considered safe for you. This usually peaks about a day after treatment and gets better within a week. It should be gone about 4 weeks after treatment. The skin may also be itchy or change color after treatment.  Recommended General Skin care:   Eliminate harsh soaps, i.e. Dial, Zest, Teresa spring.  Use mild soaps such as Cetaphil or Dove sensitive skin.  Avoid overly hot or cold showers.  Use Vanicream, Cetaphil, Eucerin or Cerave creams to moisturize your skin.  Scoop-able creams are better than thin lotions.  Apply moisturizer immediately to damp skin after patting skin dry with towel.   [employer] Public inFormation Center:   Informative resources for the public to learn more about   skin conditions, tips on performing self-examinations, sun   safety, and more The public can find information online at   www aad org or by calling [phone]-FUDQ ([phone])

## 2023-04-13 NOTE — LETTER
"    4/13/2023         RE: Dillon Matias  [address] [address] [facility] [id number]        Dear Colleague,    Thank you for referring your patient, Dillon Matias, to the [facility]. Please see a copy of my visit note below.    HPI:   Chief complaints: Dillon Matias is a pleasant 68 year old male who presents for the treatment of actinic keratoses with PDT      PHYSICAL EXAM:    There were no vitals taken for this visit.  Skin exam performed as follows: Type 2 skin. Mood appropriate  Alert and Oriented X 3. Well developed, well nourished in no distress.  General appearance: Normal  Head including face: Normal  Eyes: conjunctiva and lids: Normal  Mouth: Lips, teeth, gums: Normal  Neck: Normal  Skin: Scalp and body hair: See below    nuemrous pink gritty papules on the scalp and face >> 15 in number    ASSESSMENT/PLAN:     PDT: PGACAC discussed. Risks including but not limited to redness, swelling, and blistering to treated area. Patient was instructed to cleanse face thoroughly with a mild cleanser, then face was degreased with acetone. ALA was then applied to face in a uniform manner. Patient sat for 90 minutes after ALA was applied. The blue light shined on patient's face for 16 minutes 40 seconds he/she was approximately 2-4\" away from the light. Patient then was instructed to wash face and sunscreen with spf 50 was applied. Instructed patient to avoid sun light for 48 hours and apply sunscreen daily. Follow-up in 3-4 months.             Follow-up: 4-6 mo  CC:   Scribed By: Eduarda Gaytan MS, PACASANDRA          Again, thank you for allowing me to participate in the care of your patient.        Sincerely,        Eduarda Gaytan PA-C    "

## 2023-04-13 NOTE — PROGRESS NOTES
"HPI:   Chief complaints: Dillon Matias is a pleasant 68 year old male who presents for the treatment of actinic keratoses with PDT      PHYSICAL EXAM:    There were no vitals taken for this visit.  Skin exam performed as follows: Type 2 skin. Mood appropriate  Alert and Oriented X 3. Well developed, well nourished in no distress.  General appearance: Normal  Head including face: Normal  Eyes: conjunctiva and lids: Normal  Mouth: Lips, teeth, gums: Normal  Neck: Normal  Skin: Scalp and body hair: See below    nuemrous pink gritty papules on the scalp and face >> 15 in number    ASSESSMENT/PLAN:     PDT: PGACAC discussed. Risks including but not limited to redness, swelling, and blistering to treated area. Patient was instructed to cleanse face thoroughly with a mild cleanser, then face was degreased with acetone. ALA was then applied to face in a uniform manner. Patient sat for 90 minutes after ALA was applied. The blue light shined on patient's face for 16 minutes 40 seconds he/she was approximately 2-4\" away from the light. Patient then was instructed to wash face and sunscreen with spf 50 was applied. Instructed patient to avoid sun light for 48 hours and apply sunscreen daily. Follow-up in 3-4 months.             Follow-up: 4-6 mo  CC:   Scribed By: Eduarda Gaytan MS, PAMiriamC      "

## 2023-04-14 DIAGNOSIS — R97.20 ELEVATED PROSTATE SPECIFIC ANTIGEN (PSA): Primary | ICD-10-CM

## 2023-04-15 ASSESSMENT — SLEEP AND FATIGUE QUESTIONNAIRES
HOW LIKELY ARE YOU TO NOD OFF OR FALL ASLEEP WHILE LYING DOWN TO REST IN THE AFTERNOON WHEN CIRCUMSTANCES PERMIT: HIGH CHANCE OF DOZING
HOW LIKELY ARE YOU TO NOD OFF OR FALL ASLEEP IN A CAR, WHILE STOPPED FOR A FEW MINUTES IN TRAFFIC: WOULD NEVER DOZE
HOW LIKELY ARE YOU TO NOD OFF OR FALL ASLEEP WHILE SITTING AND READING: SLIGHT CHANCE OF DOZING
HOW LIKELY ARE YOU TO NOD OFF OR FALL ASLEEP WHEN YOU ARE A PASSENGER IN A CAR FOR AN HOUR WITHOUT A BREAK: WOULD NEVER DOZE
HOW LIKELY ARE YOU TO NOD OFF OR FALL ASLEEP WHILE SITTING QUIETLY AFTER LUNCH WITHOUT ALCOHOL: SLIGHT CHANCE OF DOZING
HOW LIKELY ARE YOU TO NOD OFF OR FALL ASLEEP WHILE SITTING INACTIVE IN A PUBLIC PLACE: WOULD NEVER DOZE
HOW LIKELY ARE YOU TO NOD OFF OR FALL ASLEEP WHILE SITTING AND TALKING TO SOMEONE: WOULD NEVER DOZE
HOW LIKELY ARE YOU TO NOD OFF OR FALL ASLEEP WHILE WATCHING TV: SLIGHT CHANCE OF DOZING

## 2023-04-17 ENCOUNTER — LAB (OUTPATIENT)
Dept: LAB | Facility: CLINIC | Age: 68
End: 2023-04-17
Payer: COMMERCIAL

## 2023-04-17 ENCOUNTER — OFFICE VISIT (OUTPATIENT)
Dept: CARDIOLOGY | Facility: CLINIC | Age: 68
End: 2023-04-17
Attending: PHYSICIAN ASSISTANT
Payer: COMMERCIAL

## 2023-04-17 VITALS
HEART RATE: 71 BPM | DIASTOLIC BLOOD PRESSURE: 87 MMHG | BODY MASS INDEX: 26.35 KG/M2 | SYSTOLIC BLOOD PRESSURE: 146 MMHG | OXYGEN SATURATION: 98 % | WEIGHT: 167 LBS

## 2023-04-17 DIAGNOSIS — I25.10 CORONARY ARTERY DISEASE INVOLVING NATIVE CORONARY ARTERY OF NATIVE HEART WITHOUT ANGINA PECTORIS: Primary | ICD-10-CM

## 2023-04-17 DIAGNOSIS — R97.20 ELEVATED PROSTATE SPECIFIC ANTIGEN (PSA): ICD-10-CM

## 2023-04-17 LAB — PSA SERPL DL<=0.01 NG/ML-MCNC: 5.3 NG/ML (ref 0–4.5)

## 2023-04-17 PROCEDURE — 93000 ELECTROCARDIOGRAM COMPLETE: CPT | Performed by: INTERNAL MEDICINE

## 2023-04-17 PROCEDURE — 84153 ASSAY OF PSA TOTAL: CPT

## 2023-04-17 PROCEDURE — 99204 OFFICE O/P NEW MOD 45 MIN: CPT | Performed by: INTERNAL MEDICINE

## 2023-04-17 PROCEDURE — 36415 COLL VENOUS BLD VENIPUNCTURE: CPT

## 2023-04-17 NOTE — LETTER
4/17/2023    Antonio Hernandez PA-C  [address] [place] [id number]    RE: Dillon Matias       Dear Colleague,     I had the pleasure of seeing Dillon Matias in the [facility].    Cardiology Consultation       Assessment & Plan     1.  History of inferior MI 2015 with treatment at [facility] with single-vessel occlusive coronary artery disease.  Underwent emergent PCI to his RCA with drug-eluting stent x1.  Remainder of coronaries with mild nonocclusive disease.  Patient had cardiogenic shock and balloon pump and escalation to Impella device was performed  2.  Former smoker  3.  Hyperlipidemia on statin therapy  4.  Normal stress test in 2019 at outside hospital  5.  Coronary calcification seen on screening CT scan of chest      Recommendations    1.  Atherosclerotic coronary artery disease: He is on appropriate guideline directed medical therapy.  Let us continue with his current medical therapy.  He has no active symptoms at present.  We will order an echocardiogram for next year.  From a surveillance standpoint we have also ordered a nuclear medicine exercise stress test.    2.  Coronary calcifications: Patient has had a thorough work-up in the past and has no active cardiac symptoms.  He is on appropriate guideline directed medical therapy and his LDL is at goal.  Let us continue with his current therapy.    3.  Reviewed his ultrasound of the abdomen for screening for AAA.  There is ectasia noted however no evidence of a AAA.  No active lower extremity symptoms.  Continue with his statin therapy.    4.  Return to clinic in 1 years time, thank you kindly for consult.         Agustina Nunez MD, MD        HPI:      Patient is a pleasant 68-year-old gentleman with the above-mentioned cardiac history.  He was in his usual health up until 2015 where he had acute onset of chest pain leading to an emergent evaluation at [facility].  Per review of notes single-vessel occlusive  coronary artery disease was noted and he underwent emergent PCI to his RCA with drug-eluting stent x1.  Remainder of his coronaries had mild nonocclusive disease he had cardiogenic shock post procedure which was mitigated by the placement of an intra-aortic balloon pump and an implant Impella device for a short period of time.  He has suffered no additional cardiovascular sequelae.  His EKG performed today demonstrates normal sinus rhythm with no evidence of a prior infarct.  He due to a longstanding history of smoking has undergone CT scans of his chest to look for nodules.  They also for the last 2 times have commented on severe coronary calcifications.  He is sent for evaluation and establishment for local cardiac care.  He recently retired and states that he is enjoying his [employer].  He performs activities of daily living and has never had any repeat symptoms since his initial presentation in 2015.  He has had a stress test as recently as 2019 which was normal.  Here for establishment of care.      Primary Care Physician   Antonio Hernandez    Patient Active Problem List   Diagnosis    Coronary artery disease involving native coronary artery of native heart without angina pectoris    Dyslipidemia    Actinic keratosis    Chronic left shoulder pain    History of tobacco abuse    Other eczema       Past Medical History   I have reviewed this patient's medical history and updated it with pertinent information if needed.   Past Medical History:   Diagnosis Date    Actinic keratosis     Basal cell carcinoma     Heart disease 07/2015       Past Surgical History   I have reviewed this patient's surgical history and updated it with pertinent information if needed.  Past Surgical History:   Procedure Laterality Date    COLONOSCOPY      ORTHOPEDIC SURGERY  08/2010    RIGHT KNEE       Prior to Admission Medications   Cannot display prior to admission medications because the patient has not been admitted in this contact.      @[email or web address]@  [unfilled]  Allergies   No Known Allergies    Social History    reports that he quit smoking about 7 years ago. His smoking use included cigarettes. He started smoking about 52 years ago. He has a 45.00 pack-year smoking history. He has never used smokeless tobacco. He reports that he does not currently use alcohol. He reports that he does not currently use drugs.    Family History   No family history on file.    Review of Systems   The comprehensive 10 point Review of Systems is negative other than noted in the HPI or here.     Physical Exam   Vital Signs with Ranges     Wt Readings from Last 4 Encounters:   01/20/23 74.2 kg (163 lb 9.6 oz)   04/12/22 75.3 kg (166 lb)   03/02/22 75.6 kg (166 lb 9.6 oz)   02/04/21 76.7 kg (169 lb 3.2 oz)     [unfilled]      Vitals: There were no vitals taken for this visit.    GENERAL: Healthy, alert and no distress  EYES: Eyes grossly normal to inspection.  No discharge or erythema, or obvious scleral/conjunctival abnormalities.  RESP: No audible wheeze, cough, or visible cyanosis.  No visible retractions or increased work of breathing.    CV:  RRR  Lungs:  CTA B  Abdomen: + BS soft NT/ND  SKIN: Visible skin clear. No significant rash, abnormal pigmentation or lesions.  NEURO: Cranial nerves grossly intact.  Mentation and speech appropriate for age.  PSYCH: Mentation appears normal, affect normal/bright, judgement and insight intact, normal speech and appearance well-groomed.      Thank you for allowing me to participate in the care of your patient.      Sincerely,     Agustina Nunez MD     [facility]  cc:   Antonio Hernandez PA-C  [address] [place] [id number]

## 2023-04-17 NOTE — PROGRESS NOTES
Cardiology Consultation       Assessment & Plan     1.  History of inferior MI 2015 with treatment at [facility] with single-vessel occlusive coronary artery disease.  Underwent emergent PCI to his RCA with drug-eluting stent x1.  Remainder of coronaries with mild nonocclusive disease.  Patient had cardiogenic shock and balloon pump and escalation to Impella device was performed  2.  Former smoker  3.  Hyperlipidemia on statin therapy  4.  Normal stress test in 2019 at outside hospital  5.  Coronary calcification seen on screening CT scan of chest      Recommendations    1.  Atherosclerotic coronary artery disease: He is on appropriate guideline directed medical therapy.  Let us continue with his current medical therapy.  He has no active symptoms at present.  We will order an echocardiogram for next year.  From a surveillance standpoint we have also ordered a nuclear medicine exercise stress test.    2.  Coronary calcifications: Patient has had a thorough work-up in the past and has no active cardiac symptoms.  He is on appropriate guideline directed medical therapy and his LDL is at goal.  Let us continue with his current therapy.    3.  Reviewed his ultrasound of the abdomen for screening for AAA.  There is ectasia noted however no evidence of a AAA.  No active lower extremity symptoms.  Continue with his statin therapy.    4.  Return to clinic in 1 years time, thank you kindly for consult.         Agustina Nunez MD, MD        HPI:      Patient is a pleasant 68-year-old gentleman with the above-mentioned cardiac history.  He was in his usual health up until 2015 where he had acute onset of chest pain leading to an emergent evaluation at [facility].  Per review of notes single-vessel occlusive coronary artery disease was noted and he underwent emergent PCI to his RCA with drug-eluting stent x1.  Remainder of his coronaries had mild nonocclusive disease he had cardiogenic shock post procedure  which was mitigated by the placement of an intra-aortic balloon pump and an implant Impella device for a short period of time.  He has suffered no additional cardiovascular sequelae.  His EKG performed today demonstrates normal sinus rhythm with no evidence of a prior infarct.  He due to a longstanding history of smoking has undergone CT scans of his chest to look for nodules.  They also for the last 2 times have commented on severe coronary calcifications.  He is sent for evaluation and establishment for local cardiac care.  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  He performs activities of daily living and has never had any repeat symptoms since his initial presentation in 2015.  He has had a stress test as recently as 2019 which was normal.  Here for establishment of care.                            Primary Care Physician   Antonio Hernandez    Patient Active Problem List   Diagnosis     Coronary artery disease involving native coronary artery of native heart without angina pectoris     Dyslipidemia     Actinic keratosis     Chronic left shoulder pain     History of tobacco abuse     Other eczema       Past Medical History   I have reviewed this patient's medical history and updated it with pertinent information if needed.   Past Medical History:   Diagnosis Date     Actinic keratosis      Basal cell carcinoma      Heart disease 07/2015       Past Surgical History   I have reviewed this patient's surgical history and updated it with pertinent information if needed.  Past Surgical History:   Procedure Laterality Date     COLONOSCOPY       ORTHOPEDIC SURGERY  08/2010    RIGHT KNEE       Prior to Admission Medications   Cannot display prior to admission medications because the patient has not been admitted in this contact.     [unfilled]  [unfilled]  Allergies   No Known Allergies    Social History    reports that he quit smoking about 7 years ago. His smoking use included cigarettes.  He started smoking about 52 years ago. He has a 45.00 pack-year smoking history. He has never used smokeless tobacco. He reports that he does not currently use alcohol. He reports that he does not currently use drugs.    Family History   No family history on file.    Review of Systems   The comprehensive 10 point Review of Systems is negative other than noted in the HPI or here.     Physical Exam   Vital Signs with Ranges     Wt Readings from Last 4 Encounters:   01/20/23 74.2 kg (163 lb 9.6 oz)   04/12/22 75.3 kg (166 lb)   03/02/22 75.6 kg (166 lb 9.6 oz)   02/04/21 76.7 kg (169 lb 3.2 oz)     [unfilled]      Vitals: There were no vitals taken for this visit.    GENERAL: Healthy, alert and no distress  EYES: Eyes grossly normal to inspection.  No discharge or erythema, or obvious scleral/conjunctival abnormalities.  RESP: No audible wheeze, cough, or visible cyanosis.  No visible retractions or increased work of breathing.    CV:  RRR  Lungs:  CTA B  Abdomen: + BS soft NT/ND  SKIN: Visible skin clear. No significant rash, abnormal pigmentation or lesions.  NEURO: Cranial nerves grossly intact.  Mentation and speech appropriate for age.  PSYCH: Mentation appears normal, affect normal/bright, judgement and insight intact, normal speech and appearance well-groomed.

## 2023-04-18 DIAGNOSIS — R97.20 ELEVATED PROSTATE SPECIFIC ANTIGEN (PSA): Primary | ICD-10-CM

## 2023-04-19 ENCOUNTER — TELEPHONE (OUTPATIENT)
Dept: FAMILY MEDICINE | Facility: CLINIC | Age: 68
End: 2023-04-19

## 2023-04-19 NOTE — TELEPHONE ENCOUNTER
Patient Quality Outreach    Patient is due for the following:   IVD  -  BP Check    Next Steps:   Blood pressure check     Type of outreach:    Sent Tink message.    Next Steps:  Reach out within 90 days via Phone.    Max number of attempts reached: No. Will try again in 90 days if patient still on fail list.    Questions for provider review:    None     Shari Chaney CMA  Chart routed to Care Team.

## 2023-05-01 ASSESSMENT — SLEEP AND FATIGUE QUESTIONNAIRES
HOW LIKELY ARE YOU TO NOD OFF OR FALL ASLEEP IN A CAR, WHILE STOPPED FOR A FEW MINUTES IN TRAFFIC: WOULD NEVER DOZE
HOW LIKELY ARE YOU TO NOD OFF OR FALL ASLEEP WHILE SITTING QUIETLY AFTER LUNCH WITHOUT ALCOHOL: SLIGHT CHANCE OF DOZING
HOW LIKELY ARE YOU TO NOD OFF OR FALL ASLEEP WHILE SITTING AND TALKING TO SOMEONE: WOULD NEVER DOZE
HOW LIKELY ARE YOU TO NOD OFF OR FALL ASLEEP WHILE WATCHING TV: SLIGHT CHANCE OF DOZING
HOW LIKELY ARE YOU TO NOD OFF OR FALL ASLEEP WHILE LYING DOWN TO REST IN THE AFTERNOON WHEN CIRCUMSTANCES PERMIT: HIGH CHANCE OF DOZING
HOW LIKELY ARE YOU TO NOD OFF OR FALL ASLEEP WHILE SITTING AND READING: SLIGHT CHANCE OF DOZING
HOW LIKELY ARE YOU TO NOD OFF OR FALL ASLEEP WHEN YOU ARE A PASSENGER IN A CAR FOR AN HOUR WITHOUT A BREAK: WOULD NEVER DOZE
HOW LIKELY ARE YOU TO NOD OFF OR FALL ASLEEP WHILE SITTING INACTIVE IN A PUBLIC PLACE: WOULD NEVER DOZE

## 2023-05-03 ENCOUNTER — OFFICE VISIT (OUTPATIENT)
Dept: SLEEP MEDICINE | Facility: CLINIC | Age: 68
End: 2023-05-03
Attending: OTOLARYNGOLOGY
Payer: COMMERCIAL

## 2023-05-03 ENCOUNTER — TRANSFERRED RECORDS (OUTPATIENT)
Dept: HEALTH INFORMATION MANAGEMENT | Facility: CLINIC | Age: 68
End: 2023-05-03

## 2023-05-03 DIAGNOSIS — R06.81 APNEA: ICD-10-CM

## 2023-05-03 DIAGNOSIS — R06.83 SNORING: ICD-10-CM

## 2023-05-03 NOTE — PROGRESS NOTES
Pt is completing a home sleep test. Pt was instructed on how to put on the Noxturnal T3 device and associated equipment before going to bed and given the opportunity to practice putting it on before leaving the sleep center. Pt was reminded to bring the home sleep test kit back to the center tomorrow, at agreed upon time for download and reporting.   Neck circumference: 38 CM.

## 2023-05-04 ENCOUNTER — DOCUMENTATION ONLY (OUTPATIENT)
Dept: SLEEP MEDICINE | Facility: CLINIC | Age: 68
End: 2023-05-04
Payer: COMMERCIAL

## 2023-05-04 NOTE — PROGRESS NOTES
HST POST-STUDY QUESTIONNAIRE    1. What time did you go to bed?  1020  2. How long do you think it took to fall asleep?  3m  3. What time did you wake up to start the day?  7a  4. Did you get up during the night at all?  y  5. If you woke up, do you remember approximately what time(s)? 1145, 1230, 145, 315  6. Did you have any difficulty with the equipment?  No  7. Did you us any type of treatment with this study?  None  8. Was the head of the bed elevated? Yes  9. Did you sleep in a recliner?  No  10. Did you stop using CPAP at least 3 days before this test?  NA  11. Any other information you'd like us to know? NA

## 2023-05-04 NOTE — PROGRESS NOTES
This HSAT was performed using a Noxturnal T3 device which recorded snore, sound, movement activity, body position, nasal pressure, oronasal thermal airflow, pulse, oximetry and both chest and abdominal respiratory effort. HSAT data was restricted to the time patient states they were in bed.     HSAT was scored using 1B 4% hypopnea rule.     HST AHI (Non-PAT): 6.5  Snoring was reported as mild and moderate.  Time with SpO2 below 89% was 0.2 minutes.   Overall signal quality was good.     Pt will follow up with sleep provider to determine appropriate therapy.

## 2023-05-05 PROCEDURE — G0399 HOME SLEEP TEST/TYPE 3 PORTA: HCPCS | Performed by: INTERNAL MEDICINE

## 2023-05-27 ENCOUNTER — ANCILLARY PROCEDURE (OUTPATIENT)
Dept: MRI IMAGING | Facility: CLINIC | Age: 68
End: 2023-05-27
Attending: STUDENT IN AN ORGANIZED HEALTH CARE EDUCATION/TRAINING PROGRAM
Payer: COMMERCIAL

## 2023-05-27 DIAGNOSIS — R97.20 ELEVATED PROSTATE SPECIFIC ANTIGEN (PSA): ICD-10-CM

## 2023-05-27 PROCEDURE — A9585 GADOBUTROL INJECTION: HCPCS | Performed by: RADIOLOGY

## 2023-05-27 PROCEDURE — 72197 MRI PELVIS W/O & W/DYE: CPT | Performed by: RADIOLOGY

## 2023-05-27 RX ORDER — GADOBUTROL 604.72 MG/ML
7.5 INJECTION INTRAVENOUS ONCE
Status: COMPLETED | OUTPATIENT
Start: 2023-05-27 | End: 2023-05-27

## 2023-05-27 RX ADMIN — GADOBUTROL 7.5 ML: 604.72 INJECTION INTRAVENOUS at 13:18

## 2023-05-27 NOTE — DISCHARGE INSTRUCTIONS
MRI Contrast Discharge Instructions    The IV contrast you received today will pass out of your body in your  urine. This will happen in the next 24 hours. You will not feel this process.  Your urine will not change color.    Drink at least 4 extra glasses of water or juice today (unless your doctor  has restricted your fluids). This reduces the stress on your kidneys.  You may take your regular medicines.    If you are on dialysis: It is best to have dialysis today.    If you have a reaction: Most reactions happen right away. If you have  any new symptoms after leaving the hospital (such as hives or swelling),  call your hospital at the correct number below. Or call your family doctor.  If you have breathing distress or wheezing, call 911.    Special instructions: ***    I have read and understand the above information.    Signature:______________________________________ Date:___________    Staff:__________________________________________ Date:___________     Time:__________    [place] [facility] Departments:    ___[place]: [phone]  ___[facility]: [phone]  ___[place]: [phone] ___[facility]: [phone]  ___[facility]: [phone]  ___[facility]: [phone]  ___[phone]  ___[facility]: [phone]  ___[phone]

## 2023-05-30 ENCOUNTER — MYC MEDICAL ADVICE (OUTPATIENT)
Dept: UROLOGY | Facility: CLINIC | Age: 68
End: 2023-05-30
Payer: COMMERCIAL

## 2023-05-30 ENCOUNTER — TELEPHONE (OUTPATIENT)
Dept: UROLOGY | Facility: CLINIC | Age: 68
End: 2023-05-30
Payer: COMMERCIAL

## 2023-05-30 NOTE — TELEPHONE ENCOUNTER
Called patient to review prostate MRI results. Voicemail left with request for patient to return call.

## 2023-05-31 NOTE — TELEPHONE ENCOUNTER
Patient calling to let Tania know that he would like to set up the prostate biopsy sooner than later. Patient is pretty open for dates and times for appt.     Shelby Batista  [facility]

## 2023-06-05 ENCOUNTER — TELEPHONE (OUTPATIENT)
Dept: UROLOGY | Facility: CLINIC | Age: 68
End: 2023-06-05
Payer: COMMERCIAL

## 2023-06-05 NOTE — TELEPHONE ENCOUNTER
MARY CARMENM offering 6/12 10:30 appointment for biopsy with Dr. Almendarez. Will send a GitHub message to the patient relaying the same info

## 2023-06-06 ENCOUNTER — PRE VISIT (OUTPATIENT)
Dept: UROLOGY | Facility: CLINIC | Age: 68
End: 2023-06-06
Payer: COMMERCIAL

## 2023-06-06 DIAGNOSIS — R97.20 ELEVATED PROSTATE SPECIFIC ANTIGEN (PSA): Primary | ICD-10-CM

## 2023-06-06 RX ORDER — GENTAMICIN 40 MG/ML
80 INJECTION, SOLUTION INTRAMUSCULAR; INTRAVENOUS ONCE
Status: COMPLETED | OUTPATIENT
Start: 2023-06-12 | End: 2023-06-12

## 2023-06-06 RX ORDER — CIPROFLOXACIN 500 MG/1
500 TABLET, FILM COATED ORAL 2 TIMES DAILY
Qty: 6 TABLET | Refills: 0 | Status: SHIPPED | OUTPATIENT
Start: 2023-06-06 | End: 2023-06-09

## 2023-06-06 NOTE — TELEPHONE ENCOUNTER
Reason for visit: prostate biopsy     Dx/Hx/Sx: elevated PSA    Records/imaging/labs/orders: MRI in [facility]     At [facility]: MRI guided (PIRADS-5, 1 lesion), gent    Called patient to go over prep for biopsy including:      No blood thinning medications for 7 days before procedure, including aspirin, anticoagulants, ibuprofen and fish oil.       prophylactic antibiotics sent to primary pharmacy listed in chart:   -take the day before, the day of and the day after the procedure, one tablet, two times daily.      Complete a Fleets enema approximately 2 hours before the scheduled procedure.      Do not come to the appointment fasted.

## 2023-06-12 ENCOUNTER — OFFICE VISIT (OUTPATIENT)
Dept: UROLOGY | Facility: CLINIC | Age: 68
End: 2023-06-12
Payer: COMMERCIAL

## 2023-06-12 VITALS
DIASTOLIC BLOOD PRESSURE: 78 MMHG | BODY MASS INDEX: 23.95 KG/M2 | WEIGHT: 158 LBS | HEIGHT: 68 IN | HEART RATE: 60 BPM | SYSTOLIC BLOOD PRESSURE: 144 MMHG

## 2023-06-12 DIAGNOSIS — R97.20 ELEVATED PROSTATE SPECIFIC ANTIGEN (PSA): Primary | ICD-10-CM

## 2023-06-12 PROCEDURE — 76999 ECHO EXAMINATION PROCEDURE: CPT | Performed by: UROLOGY

## 2023-06-12 PROCEDURE — 88305 TISSUE EXAM BY PATHOLOGIST: CPT | Mod: TC | Performed by: UROLOGY

## 2023-06-12 PROCEDURE — 55700 PR BIOPSY OF PROSTATE,NEEDLE/PUNCH: CPT | Performed by: UROLOGY

## 2023-06-12 PROCEDURE — G0416 PROSTATE BIOPSY, ANY MTHD: HCPCS | Mod: 26 | Performed by: PATHOLOGY

## 2023-06-12 PROCEDURE — 76872 US TRANSRECTAL: CPT | Performed by: UROLOGY

## 2023-06-12 RX ADMIN — GENTAMICIN 80 MG: 40 INJECTION, SOLUTION INTRAMUSCULAR; INTRAVENOUS at 10:42

## 2023-06-12 ASSESSMENT — PAIN SCALES - GENERAL: PAINLEVEL: NO PAIN (0)

## 2023-06-12 NOTE — PATIENT INSTRUCTIONS
[facility]  Precautions Following a Prostate Biopsy    There are four conditions that you should watch for after a prostate biopsy:    Excessive pain  Bleeding irregularities (passing numerous  dime sized  clots or if your urine looks like cranberry juice)  Fever of 100 degrees or more  If you are unable to urinate      If any of these occur, call the Urology Clinic during normal business hours (M-F, 8:00-4:30) at [phone].  If you experience a problem after normal business hours, call our 24-hour phone number at [phone] and ask for the Urology Resident on call to be paged.    If you experience any discomfort following the biopsy, you may take Tylenol.  DO NOT TAKE ASPIRIN unless specified by your physician.   If the discomfort becomes severe or uncontrolled by medication, contact the Urology Clinic or Urology Resident (after normal business hours).    Do not be alarmed if you have some blood in your stool, in your urine, or ejaculate (semen).  This occurrence is normal and may last up to three (3) or four (4) days, usually intermittently.  Blood in the ejaculate (semen) may last several weeks, up to about a dozen ejaculations.  The blood in your ejaculate may appear as brown streaks, blood tinged, and immediately following a biopsy, it may appear bright red.    If you run a fever above 100 degrees, call the Urology Clinic or Urology Resident (after normal business hours) immediately.  If you are unable to reach your physician or the Resident on call, go to the nearest emergency room.  Explain that you have had a transrectal biopsy of your prostate and what problems you are experiencing.      You should attempt to urinate following your biopsy before you leave the clinic.  If you are unable to urinate four (4) to six (6) hours after you leave the clinic, you will need to contact the Urology Clinic or the Resident on call.  If you are unable to reach your physician or the  Resident on call, go to the nearest emergency room.            If you have any questions or concerns after your biopsy, feel free to contact the Urology Clinic at [phone] during M-F, 8:00-5pm business hours.  If you need to speak with someone after normal business hours, call [phone] and ask for the Resident on call to be paged.

## 2023-06-12 NOTE — PROGRESS NOTES
Reason for visit: MRI ultrasound fusion prostate biopsy    Indications: Mr. Matias is a 68 year old -year-old gentleman followed in clinic for elevated PSA. He presents today for  MRI/ultrasound fusion prostate biopsy.    Procedure: After informed consent was obtained and after confirming the patient has been off aspirin or aspirin like products for a week, took his antibiotics and perform his enema, the patient was placed left side down on the procedure table. Digital rectal exam was performed revealing a normal feeling prostate. The ultrasound probe was lubricated and placed into the patient's rectum without difficulty. Inspection of the prostate revealed a few calcifications, but no obvious hypoechoic regions.   Next 5 ccs of lidocaine were injected at the junction of the prostate and the seminal vesicles bilaterally.  Measurement of the prostate were then obtained revealing a volume of 31 ccs.  We then performed an ultrasound sweep of the prostate. These post-processed images were then utilized by the UroNav software to create a 3-D prostate volume.  These images were then overlaid on the patient's MRI from 5/27/23 and MRI/ ultrasound fusion was performed. We then obtained 2 cores from target #1, a lesion in the right base TZ 10-11 oclock. We then obtained another 12 cores in the standard sextant distribution with an additional core each from the left and right transition zones for a total of 16 cores obtained. The patient tolerated the procedure without difficulty.    The patient was counseled he could expect to see blood in his urine, semen, and stool for the next several days and should contact us should he develop any fevers chills or sweats. We'll see the patient back in a week to go over the results of his biopsy.

## 2023-06-12 NOTE — PROGRESS NOTES
The following medication was given:     MEDICATION:  Gentamicin  ROUTE: IM  SITE: Deltoid - Right  DOSE: 80 mg  LOT #: [id number]  : [employer]  EXPIRATION DATE: 04/24  NDC#: 598305p   Was there drug waste? No  Multi-dose vial: Gina Bah RN  June 12, 2023

## 2023-06-12 NOTE — NURSING NOTE
"Chief Complaint   Patient presents with     Prostate Biopsy       Blood pressure (!) 144/78, pulse 60, height 1.715 m (5' 7.5\"), weight 71.7 kg (158 lb). Body mass index is 24.38 kg/m .    Patient Active Problem List   Diagnosis     Coronary artery disease involving native coronary artery of native heart without angina pectoris     Dyslipidemia     Actinic keratosis     Chronic left shoulder pain     History of tobacco abuse     Other eczema       No Known Allergies    Current Outpatient Medications   Medication Sig Dispense Refill     atorvastatin (LIPITOR) 80 MG tablet Take 1 tablet (80 mg) by mouth daily 90 tablet 3     triamcinolone (KENALOG) 0.1 % external cream Apply to AA on the leg BID x 1-2 week then PRN only 454 g 2     aspirin (ASA) 81 MG chewable tablet Take 1 tablet (81 mg) by mouth daily (Patient not taking: Reported on 2023) 90 tablet 3     Multiple Vitamin (MULTIVITAMIN ADULT PO)  (Patient not taking: Reported on 2023)       valACYclovir (VALTREX) 1000 mg tablet 2 tablets now; 2 more 12 hours later 4 tablet 0       Social History     Tobacco Use     Smoking status: Former     Packs/day: 1.00     Years: 45.00     Pack years: 45.00     Types: Cigarettes     Start date: 1970     Quit date: 7/15/2015     Years since quittin.9     Smokeless tobacco: Never   Vaping Use     Vaping status: Never Used   Substance Use Topics     Alcohol use: Not Currently     Drug use: Not Currently       Invasive Procedure Safety Checklist:    Procedure: MRI fusion TRUS prostate biopsy    Action: Complete sections and checkboxes as appropriate.    Pre-procedure:  1. Patient ID Verified with 2 identifiers (Sagrario and  or MRN) : YES    2. Procedure and site verified with patient/designee (when able) : YES    3. Accurate consent documentation in medical record : YES    4. H&P (or appropriate assessment) documented in medical record : N/A  H&P must be up to 30 days prior to procedure an updated within 24 hours " of                 Procedure as applicable.     5. Relevant diagnostic and radiology test results appropriately labeled and displayed as applicable : YES    6. Blood products, implants, devices, and/or special equipment available for the procedure as applicable : YES    7. Procedure site(s) marked with provider initials [Exclusions: none] : NO    8. Marking not required. Reason : Yes  Procedure does not require site marking    Time Out:     Time-Out performed immediately prior to starting procedure, including verbal and active participation of all team members addressing: YES    1. Correct patient identity.  2. Confirmed that the correct side and site are marked.  3. An accurate procedure to be done.  4. Agreement on the procedure to be done.  5. Correct patient position.  6. Relevant images and results are properly labeled and appropriately displayed.  7. The need to administer antibiotics or fluids for irrigation purposes during the procedure as applicable.  8. Safety precautions based on patient history or medication use.    During Procedure: Verification of correct person, site, and procedure occurs any time the responsibility for care of the patient is transferred to another member of the care team.    The following medication was given:     MEDICATION:  Lidocaine without epinephrine 1%  ROUTE: administered by physician - prostate nerve block   SITE: administered by physician - prostate nerve block    DOSE: 10 mL  LOT #: [id number]  : Genecure  EXPIRATION DATE: 02/26  NDC#: 52632-909-58   Was there drug waste? Yes  Amount of drug waste (mL): 20 mL.  Reason for waste:  Single use vial    Prior to injection, verified patient identity using patient's name and date of birth.  Due to injection administration, patient instructed to remain in clinic for 15 minutes  afterwards, and to report any adverse reaction to me immediately.    Drug Amount Wasted:  Yes: 20 mL (10 mg/ml )  Vial/Syringe: Single dose  carmelo Nunes  6/12/2023  10:54 AM

## 2023-06-12 NOTE — LETTER
6/12/2023       RE: Dillon Matias  [address] [address]     Dear Colleague,    Thank you for referring your patient, Dillon Matias, to the [facility] [place] at [facility]. Please see a copy of my visit note below.    The following medication was given:     MEDICATION:  Gentamicin  ROUTE: IM  SITE: Deltoid - Right  DOSE: 80 mg  LOT #: [id number]  : makerist  EXPIRATION DATE: 04/24  NDC#: 102788x   Was there drug waste? No  Multi-dose vial: No    Hira Bah RN  June 12, 2023    Reason for visit: MRI ultrasound fusion prostate biopsy    Indications: Mr. Matias is a 68 year old -year-old gentleman followed in clinic for elevated PSA. He presents today for  MRI/ultrasound fusion prostate biopsy.    Procedure: After informed consent was obtained and after confirming the patient has been off aspirin or aspirin like products for a week, took his antibiotics and perform his enema, the patient was placed left side down on the procedure table. Digital rectal exam was performed revealing a normal feeling prostate. The ultrasound probe was lubricated and placed into the patient's rectum without difficulty. Inspection of the prostate revealed a few calcifications, but no obvious hypoechoic regions.   Next 5 ccs of lidocaine were injected at the junction of the prostate and the seminal vesicles bilaterally.  Measurement of the prostate were then obtained revealing a volume of 31 ccs.  We then performed an ultrasound sweep of the prostate. These post-processed images were then utilized by the UroNav software to create a 3-D prostate volume.  These images were then overlaid on the patient's MRI from 5/27/23 and MRI/ ultrasound fusion was performed. We then obtained 2 cores from target #1, a lesion in the right base TZ 10-11 oclock. We then obtained another 12 cores in the standard sextant distribution with an  additional core each from the left and right transition zones for a total of 16 cores obtained. The patient tolerated the procedure without difficulty.    The patient was counseled he could expect to see blood in his urine, semen, and stool for the next several days and should contact us should he develop any fevers chills or sweats. We'll see the patient back in a week to go over the results of his biopsy.     Sincerely,    Michael Almendarez MD

## 2023-06-13 ENCOUNTER — PRE VISIT (OUTPATIENT)
Dept: UROLOGY | Facility: CLINIC | Age: 68
End: 2023-06-13
Payer: COMMERCIAL

## 2023-06-14 LAB
PATH REPORT.COMMENTS IMP SPEC: ABNORMAL
PATH REPORT.COMMENTS IMP SPEC: YES
PATH REPORT.FINAL DX SPEC: ABNORMAL
PATH REPORT.GROSS SPEC: ABNORMAL
PATH REPORT.MICROSCOPIC SPEC OTHER STN: ABNORMAL
PATH REPORT.RELEVANT HX SPEC: ABNORMAL
PHOTO IMAGE: ABNORMAL

## 2023-06-17 ENCOUNTER — DOCUMENTATION ONLY (OUTPATIENT)
Dept: SLEEP MEDICINE | Facility: CLINIC | Age: 68
End: 2023-06-17
Payer: COMMERCIAL

## 2023-06-17 DIAGNOSIS — G47.33 OSA (OBSTRUCTIVE SLEEP APNEA): ICD-10-CM

## 2023-06-17 NOTE — PROCEDURES
"HOME SLEEP STUDY INTERPRETATION    Patient: Dillon Matias  MRN: [id number]  YOB: 1955  Study Date: 5/5/2023  Referring Provider: Abdirahman Hernandez MD  Ordering Provider:Dr Erazo     Indications for Home Study: Dillon Matias is a 68 year old male with a history of insomnia, CAD, basal cell carcinoma who presents with symptoms suggestive of obstructive sleep apnea.    Estimated body mass index is 24.38 kg/m  as calculated from the following:    Height as of 6/12/23: 1.715 m (5' 7.5\").    Weight as of 6/12/23: 71.7 kg (158 lb).  Total score - [place]: 6 (5/1/2023  9:30 AM)  Total Score: 4 (5/1/2023  9:31 AM)    Data: A full night home sleep study was performed recording the standard physiologic parameters including body position, movement, sound, nasal pressure, thermal oral airflow, chest and abdominal movements with respiratory inductance plethysmography, and oxygen saturation by pulse oximetry. Pulse rate was estimated by oximetry recording. This study was considered adequate based on > 4 hours of quality oximetry and respiratory recording. As specified by the AASM Manual for the Scoring of Sleep and Associated events, version 2.3, Rule VIII.D 1B, 4% oxygen desaturation scoring for hypopneas is used as a standard of care on all home sleep apnea testing.    Analysis Time:  414 minutes    Respiration:   Sleep Associated Hypoxemia: sustained hypoxemia was not present. Baseline oxygen saturation was 93.3%.  Time with saturation less than or equal to 88% was 0.2 minutes. The lowest oxygen saturation was 86%.   Snoring: Snoring was present.  Respiratory events: The home study revealed a presence of 10 obstructive apneas and 10 mixed and central apneas. There were 25 hypopneas resulting in a combined apnea/hypopnea index [AHI] of 6.5 events per hour.  AHI was 74.2 per hour supine, 10.1 per hour prone, 5.3 per hour on left side, and 2.7 per hour on right side.   Pattern: Excluding events noted above, " respiratory rate and pattern was Normal.    Position: Percent of time spent: supine - 3.3%, prone - 5.7%, on left - 38%, on right - 52.9%.    Heart Rate: By pulse oximetry normal rate was noted.     Assessment:   Mild obstructive sleep apnea. There was minimal supine sleep which may have underestimated the severity of EVANGELISTA.  Sleep associated hypoxemia was not present.    Recommendations:  Consider auto-CPAP at 5-15 cmH2O, oral appliance therapy or positional therapy.  Suggest optimizing sleep hygiene and avoiding sleep deprivation.  Weight management.    Diagnosis Code(s): Obstructive Sleep Apnea G47.33, Hypoxemia G47.36, Snoring R06.83    Navdeep Lara MD, June 17, 2023   Diplomate, American Board of Internal Medicine, Sleep Medicine

## 2023-06-19 ENCOUNTER — VIRTUAL VISIT (OUTPATIENT)
Dept: UROLOGY | Facility: CLINIC | Age: 68
End: 2023-06-19
Payer: COMMERCIAL

## 2023-06-19 DIAGNOSIS — C61 PROSTATE CANCER (H): Primary | ICD-10-CM

## 2023-06-19 PROCEDURE — 99215 OFFICE O/P EST HI 40 MIN: CPT | Mod: VID | Performed by: UROLOGY

## 2023-06-19 NOTE — NURSING NOTE
Is the patient currently in the state of [place]? YES    Visit mode:VIDEO    If the visit is dropped, the patient can be reconnected by: VIDEO VISIT: Send to e-mail at: [email or web address]    Will anyone else be joining the visit? NO      How would you like to obtain your AVS? MyChart    Are changes needed to the allergy or medication list? NO    Reason for visit: Follow Up (Bx results)    Pt is joined by wife Zoe today. Unable to assess PHQ.     iSlvana Bradford on 6/19/2023 at 3:52 PM

## 2023-06-19 NOTE — PROGRESS NOTES
Virtual Visit Details    Type of service:  Video Visit   Video Start Time:4:32pm    CC: biopsy results    HPI: 69 yo male s/p prostate biopsy 6/12/23.  No problems after the biopsy    OBJECTIVE:  Pathology from 6/12/23 shows:  A. Prostate, Target 1: Right Base TZ 10:00-11:00, Core Needle Biopsy:  - Prostatic adenocarcinoma, acinar type  - Troy score 7 (3+4; 10% pattern 4)  - Grade group 2  - Extent: 2 of 2 cores (9 mm, 80%; 8 mm, 70%)      B. Prostate, Left Base, Core Needle Biopsy:  - Benign prostate tissue      C. Prostate, Left Mid, Core Needle Biopsy:  - Prostatic adenocarcinoma, acinar type  - Troy score 6 (3+3)  - Grade group 1  - Extent: 1 of 2 cores (2 mm; 30%)      D. Prostate, Left [place], Core Needle Biopsy:  - Prostatic adenocarcinoma, acinar type  - Jovani score 6 (3+3)  - Grade group 1  - Extent: 1 of 2 cores (1 mm; 10%)      E. Prostate, Right Base, Core Needle Biopsy:  - Benign prostate tissue      F.  Prostate, Right Mid, Core Needle Biopsy:  - Benign prostate tissue      G. Prostate, Right [place], Core Needle Biopsy:  - Benign prostate tissue      H. Prostate, Left Transitional Zone, Core Needle Biopsy:  - Benign prostate tissue      I. Prostate, Right Transitional Zone, Core Needle Biopsy:  - Prostatic adenocarcinoma, acinar type  - Jovani score 7 (3+4; 10% pattern 4)  - Grade group 2  - Extent: 1 of 1 cores (12 mm, 90%)     ASSESSMENT AND PLAN:  Over half of today's 45-minute visit was spent counseling the patient regarding his new diagnosis of prostate cancer.  I suggested to the patient that given his PSA<10 ng/mL, normal or minimally abnormal exam and Gl 3+4=7 he has favorable intermediate risk prostate cancer.  As such, the patient will not require a staging evaluation.  The patient would be a candidate for observation, definitive therapy including surgery, both open and robotically assisted laparoscopic approaches, as well as radiation therapy in the form of external beam radiation  versus brachytherapy versus proton beam therapy.  We also briefly touched on HIFU and cryotherapy.  We discussed risks of surgery including leaking of urine and erectile dysfunction.  I counseled the patient that at a year from surgery 90% of men would be dry and not need to wear any pads and 70% of men in their early to mid 50s with perfect erectile function going in who underwent a bilateral nerve-sparing would be able to have erections sufficient for intercourse with or without the use of Viagra.  We also discussed risks of radiation therapy including development of irritative urinary symptoms such as urgency, frequency of urination or urgency, frequency of stooling, chronically loose stools or blood in the stool as well as blood in the urine.  We also discussed erectile dysfunction is a risk of radiation therapy such that 2-3 years following the treatment 50% of men will have new onset or worsening of preexisting erectile dysfunction.  We also discussed rectal toxicity from radiation can be reduced with placement of SpaceOAR.  We discussed that patients can have radiation after surgery, but generally speaking, we do not do it in reverse.  The patient asked many good questions today and these were answered to his satisfaction.  At this point, I suggested the patient meet with Radiation Oncology to hear about radiation options directly from them.  In addition, I have also suggested the patient obtain Dr. Antonio Buchanan's Guide to Surviving Prostate Cancer, which is an excellent resource for all patients with prostate cancer.  We would then plan to see the patient back in 4-6 weeks from now to answer further questions and begin to make some plans at that time.         Video End Time:5:10pm    Originating Location (pt. Location): Home    Distant Location (provider location):  Off-site  Platform used for Video Visit: Kathleen

## 2023-06-19 NOTE — LETTER
6/19/2023       RE: Dillon Matias  [address] [address]     Dear Colleague,    Thank you for referring your patient, Dillon Matias, to the [facility] [place] at [facility]. Please see a copy of my visit note below.    Virtual Visit Details    Type of service:  Video Visit   Video Start Time:4:32pm    CC: biopsy results    HPI: 67 yo male s/p prostate biopsy 6/12/23.  No problems after the biopsy    OBJECTIVE:  Pathology from 6/12/23 shows:  A. Prostate, Target 1: Right Base TZ 10:00-11:00, Core Needle Biopsy:  - Prostatic adenocarcinoma, acinar type  - [place] score 7 (3+4; 10% pattern 4)  - Grade group 2  - Extent: 2 of 2 cores (9 mm, 80%; 8 mm, 70%)      B. Prostate, Left Base, Core Needle Biopsy:  - Benign prostate tissue      C. Prostate, Left Mid, Core Needle Biopsy:  - Prostatic adenocarcinoma, acinar type  - Gateway score 6 (3+3)  - Grade group 1  - Extent: 1 of 2 cores (2 mm; 30%)      D. Prostate, Left [place], Core Needle Biopsy:  - Prostatic adenocarcinoma, acinar type  - Gateway score 6 (3+3)  - Grade group 1  - Extent: 1 of 2 cores (1 mm; 10%)      E. Prostate, Right Base, Core Needle Biopsy:  - Benign prostate tissue      F.  Prostate, Right Mid, Core Needle Biopsy:  - Benign prostate tissue      G. Prostate, Right [place], Core Needle Biopsy:  - Benign prostate tissue      H. Prostate, Left Transitional Zone, Core Needle Biopsy:  - Benign prostate tissue      I. Prostate, Right Transitional Zone, Core Needle Biopsy:  - Prostatic adenocarcinoma, acinar type  - Gateway score 7 (3+4; 10% pattern 4)  - Grade group 2  - Extent: 1 of 1 cores (12 mm, 90%)     ASSESSMENT AND PLAN:  Over half of today's 45-minute visit was spent counseling the patient regarding his new diagnosis of prostate cancer.  I suggested to the patient that given his PSA<10 ng/mL, normal or minimally abnormal exam and Gl 3+4=7 he has favorable  intermediate risk prostate cancer.  As such, the patient will not require a staging evaluation.  The patient would be a candidate for observation, definitive therapy including surgery, both open and robotically assisted laparoscopic approaches, as well as radiation therapy in the form of external beam radiation versus brachytherapy versus proton beam therapy.  We also briefly touched on HIFU and cryotherapy.  We discussed risks of surgery including leaking of urine and erectile dysfunction.  I counseled the patient that at a year from surgery 90% of men would be dry and not need to wear any pads and 70% of men in their early to mid 50s with perfect erectile function going in who underwent a bilateral nerve-sparing would be able to have erections sufficient for intercourse with or without the use of Viagra.  We also discussed risks of radiation therapy including development of irritative urinary symptoms such as urgency, frequency of urination or urgency, frequency of stooling, chronically loose stools or blood in the stool as well as blood in the urine.  We also discussed erectile dysfunction is a risk of radiation therapy such that 2-3 years following the treatment 50% of men will have new onset or worsening of preexisting erectile dysfunction.  We also discussed rectal toxicity from radiation can be reduced with placement of SpaceOAR.  We discussed that patients can have radiation after surgery, but generally speaking, we do not do it in reverse.  The patient asked many good questions today and these were answered to his satisfaction.  At this point, I suggested the patient meet with Radiation Oncology to hear about radiation options directly from them.  In addition, I have also suggested the patient obtain Dr. Antonio Buchanan's Guide to Surviving Prostate Cancer, which is an excellent resource for all patients with prostate cancer.  We would then plan to see the patient back in 4-6 weeks from now to answer  further questions and begin to make some plans at that time.         Video End Time:5:10pm    Originating Location (pt. Location): Home    Distant Location (provider location):  Off-site  Platform used for Video Visit: Kathleen      Sincerely,    Michael Almendarez MD

## 2023-06-22 DIAGNOSIS — C61 PROSTATE CANCER (H): Primary | ICD-10-CM

## 2023-07-10 ENCOUNTER — OFFICE VISIT (OUTPATIENT)
Dept: RADIATION THERAPY | Facility: OUTPATIENT CENTER | Age: 68
End: 2023-07-10
Payer: COMMERCIAL

## 2023-07-10 VITALS
OXYGEN SATURATION: 97 % | SYSTOLIC BLOOD PRESSURE: 145 MMHG | DIASTOLIC BLOOD PRESSURE: 82 MMHG | WEIGHT: 159.8 LBS | HEART RATE: 66 BPM | BODY MASS INDEX: 24.66 KG/M2 | RESPIRATION RATE: 12 BRPM

## 2023-07-10 DIAGNOSIS — C61 PROSTATE CANCER (H): ICD-10-CM

## 2023-07-10 NOTE — LETTER
7/10/2023         RE: Dillon Matias  [address] [address]        Dear Colleague,    Thank you for referring your patient, Dillon Matias, to the [facility] RADIATION THERAPY CLINIC. Please see a copy of my visit note below.       Department of Radiation Oncology  Radiation Therapy Center  [facility]  [address], [address]  [place] [address] [phone]       Consultation Note    Name: Dillon Matias MRN: [id number]   : 1955   Date of Service: 7/10/2023  Referring: Dr. Almendarez     Reason for consultation: Adenocarcinoma of the prostate, Jovani score 3+4 = 7, PSA = 6.26, clinical T1 cN0 M0.  Evaluate role for radiation therapy.    History of Present Illness   Mr. Matias is a 68 year old male a diagnosis of prostate cancer.     The patient underwent PSA on 2023 which demonstrated a value of 6.26.  Repeat PSA on 2023 demonstrated value 5.3.  MRI of the prostate on 2023 demonstrated a 26cc prostate, with PI-RADS 5 lesion located at the right base and mid gland transitional zone involving the anterior fibromuscular stroma with long segment capsular abutment indicating moderate suspicion of minimal ROMULO.  No lymphadenopathy noted.  The patient underwent prostate biopsy by Dr. Almendarez on 2023.  Pathology demonstrated prostate adenocarcinoma, Benedict score 3+4 equal 7, 5 cores of 16 cores were involved with cancer.  Patient met with Dr. Almendarez we discussed different options including surgery versus radiation therapy.  Patient presents to our clinic to discuss potential role of radiation therapy as a part of treatment strategy.    Patient is overall doing well.  No prior radiation.  No pacemaker.  AUA = 3, NAKUL= 0.  No history of inflammatory bowel disease.    Past Medical History:   Past Medical History:   Diagnosis Date    Actinic keratosis     Basal cell carcinoma     Heart disease 2015       Past Surgical History:   Past  Surgical History:   Procedure Laterality Date    COLONOSCOPY      ORTHOPEDIC SURGERY  2010    RIGHT KNEE       Chemotherapy History:  None    Radiation History:  None    Pregnant: Not Applicable  Implanted Cardiac Devices: No    Medications:  Current Outpatient Medications   Medication    aspirin (ASA) 81 MG chewable tablet    atorvastatin (LIPITOR) 80 MG tablet    Multiple Vitamin (MULTIVITAMIN ADULT PO)    triamcinolone (KENALOG) 0.1 % external cream    valACYclovir (VALTREX) 1000 mg tablet     Current Facility-Administered Medications   Medication    lidocaine 1 % 10 mL         Allergies:   No Known Allergies      Family History:  No family history on file.    Review of Systems   A 10-point review of systems was performed. Pertinent findings are noted in the HPI.    Physical Exam   ECOG Status: 1    Vitals:  BP (!) 145/82 (BP Location: Left arm, Patient Position: Chair, Cuff Size: Adult Regular)   Pulse 66   Resp 12   Wt 72.5 kg (159 lb 12.8 oz)   SpO2 97%   BMI 24.66 kg/m      Gen: Alert, in NAD  Head: NC/AT  Eyes: PERRL, EOMI, sclera anicteric  Ears: No external auricular lesions  Nose/sinus: No rhinorrhea or epistaxis  Oral cavity/oropharynx: MMM, no visible oral cavity lesions, FOM and BOT are soft to palpation  Neck: Full ROM, supple, no palpable adenopathy  Pulm: No wheezing, stridor or respiratory distress  CV: Extremities are warm and well-perfused, no cyanosis, no pedal edema  Abdominal: Normal bowel sounds, soft, nontender, no masses  Musculoskeletal: Normal bulk and tone  Skin: Normal color and turgor  Neuro: A/Ox3, CN II-XII intact, normal gait    Imaging/Path/Labs   Imagin23  MRI Prostate  IMPRESSION:  1. Based on the most suspicious abnormality, this exam is  characterized as PIRADS 5 - Clinically significant cancer is highly  likely to be present. The most suspicious abnormality is located at  the right base and mid gland transitional zone involving the AFMS and  there is long  segment capsular abutment indicating moderate suspicion  of minimal extraprostatic extension.  2. No suspicious adenopathy or evidence of pelvic metastases.      Path:   6/12/23  A. Prostate, Target 1: Right Base TZ 10:00-11:00, Core Needle Biopsy:  - Prostatic adenocarcinoma, acinar type  - Jovani score 7 (3+4; 10% pattern 4)  - Grade group 2  - Extent: 2 of 2 cores (9 mm, 80%; 8 mm, 70%)      B. Prostate, Left Base, Core Needle Biopsy:  - Benign prostate tissue      C. Prostate, Left Mid, Core Needle Biopsy:  - Prostatic adenocarcinoma, acinar type  - Jovani score 6 (3+3)  - Grade group 1  - Extent: 1 of 2 cores (2 mm; 30%)      D. Prostate, Left [place], Core Needle Biopsy:  - Prostatic adenocarcinoma, acinar type  - Jovani score 6 (3+3)  - Grade group 1  - Extent: 1 of 2 cores (1 mm; 10%)      E. Prostate, Right Base, Core Needle Biopsy:  - Benign prostate tissue      F.  Prostate, Right Mid, Core Needle Biopsy:  - Benign prostate tissue      G. Prostate, Right [place], Core Needle Biopsy:  - Benign prostate tissue      H. Prostate, Left Transitional Zone, Core Needle Biopsy:  - Benign prostate tissue      I. Prostate, Right Transitional Zone, Core Needle Biopsy:  - Prostatic adenocarcinoma, acinar type  - Jovani score 7 (3+4; 10% pattern 4)  - Grade group 2  - Extent: 1 of 1 cores (12 mm, 90%)     Labs:   Prostate Specific Antigen Screen   Date Value Ref Range Status   01/20/2023 6.26 (H) 0.00 - 4.50 ng/mL Final     PSA Tumor Marker   Date Value Ref Range Status   04/17/2023 5.30 (H) 0.00 - 4.50 ng/mL Final           Assessment    Mr. Matias is a 68 year old male with a diagnosis of  adenocarcinoma of the prostate, Welch score 3+4 = 7, PSA = 6.26, clinical T1 cN0 M0.  Evaluate role for radiation therapy.    Plan   1.  The patient has favorable intermediate risk prostate cancer based upon his Welch 3+4=7 pathology. I would expect his life expectancy to be greater than 10 years. As such, we recommend definitive  treatment.      2. We discussed the results of the PIVOT trial,  in which surgery was associated with lower all-cause mortality than observation among men with intermediate-risk disease.     3. We discussed definitive treatment options including surgery vs. radiation therapy. Radiation therapy techniques discussed included EBRT with conventional vs. hypofractionation. The patient has met with urology team, who does not feel he is an ideal candidate for surgery given his medical comorbidities.   He wishes to undergo a course of EBRT. We also discussed that a short course of ADT in addition to the radiation could be considered. In general,  the addition of a short course of ADT to the radiation has been associated with an biochemical control benefit and possibly OS compared to RT alone in patients with intermediate risk disease. However, given favorable intermediate risk disease, the benefit of ADT may not outweigh its risks.     4. We discussed radiation side effects of treatment including fatigue, urinary obstructive symptoms, loose stool, proctitis, cystitis, and erectile dysfunction.      5.  We discussed that we would recommend hypofractionated RT over 5.5 weeks.      6. We discussed  fiducial placement/ Spacer OAR gel placement at rectal-prostate interface prior to initiation of RT.      7. The patient wishes to think about his options. He will also meet with Dr. Almendarez on 8/7/23 to re-discuss potential treatment options. We will also plan to see him in about 1 month to help finalize next steps.       Jaren Ohara MD  Department of Radiation Oncology  [facility]

## 2023-07-10 NOTE — PROGRESS NOTES
Department of Radiation Oncology  Radiation Therapy Center  [facility]  [address], [address]  [place] [address]  [phone]       Consultation Note    Name: Dillon Matias MRN: [id number]   : 1955   Date of Service: 7/10/2023  Referring: Dr. Almendarez     Reason for consultation: Adenocarcinoma of the prostate, Jovani score 3+4 = 7, PSA = 6.26, clinical T1 cN0 M0.  Evaluate role for radiation therapy.    History of Present Illness   Mr. Matias is a 68 year old male a diagnosis of prostate cancer.     The patient underwent PSA on 2023 which demonstrated a value of 6.26.  Repeat PSA on 2023 demonstrated value 5.3.  MRI of the prostate on 2023 demonstrated a 26cc prostate, with PI-RADS 5 lesion located at the right base and mid gland transitional zone involving the anterior fibromuscular stroma with long segment capsular abutment indicating moderate suspicion of minimal ROMULO.  No lymphadenopathy noted.  The patient underwent prostate biopsy by Dr. Almendarez on 2023.  Pathology demonstrated prostate adenocarcinoma, Charlotte score 3+4 equal 7, 5 cores of 16 cores were involved with cancer.  Patient met with Dr. Almendarez we discussed different options including surgery versus radiation therapy.  Patient presents to our clinic to discuss potential role of radiation therapy as a part of treatment strategy.    Patient is overall doing well.  No prior radiation.  No pacemaker.  AUA = 3, NAKUL= 0.  No history of inflammatory bowel disease.    Past Medical History:   Past Medical History:   Diagnosis Date     Actinic keratosis      Basal cell carcinoma      Heart disease 2015       Past Surgical History:   Past Surgical History:   Procedure Laterality Date     COLONOSCOPY       ORTHOPEDIC SURGERY  2010    RIGHT KNEE       Chemotherapy History:  None    Radiation History:  None    Pregnant: Not Applicable  Implanted Cardiac Devices: No    Medications:  Current  Outpatient Medications   Medication     aspirin (ASA) 81 MG chewable tablet     atorvastatin (LIPITOR) 80 MG tablet     Multiple Vitamin (MULTIVITAMIN ADULT PO)     triamcinolone (KENALOG) 0.1 % external cream     valACYclovir (VALTREX) 1000 mg tablet     Current Facility-Administered Medications   Medication     lidocaine 1 % 10 mL         Allergies:   No Known Allergies      Family History:  No family history on file.    Review of Systems   A 10-point review of systems was performed. Pertinent findings are noted in the HPI.    Physical Exam   ECOG Status: 1    Vitals:  BP (!) 145/82 (BP Location: Left arm, Patient Position: Chair, Cuff Size: Adult Regular)   Pulse 66   Resp 12   Wt 72.5 kg (159 lb 12.8 oz)   SpO2 97%   BMI 24.66 kg/m      Gen: Alert, in NAD  Head: NC/AT  Eyes: PERRL, EOMI, sclera anicteric  Ears: No external auricular lesions  Nose/sinus: No rhinorrhea or epistaxis  Oral cavity/oropharynx: MMM, no visible oral cavity lesions, FOM and BOT are soft to palpation  Neck: Full ROM, supple, no palpable adenopathy  Pulm: No wheezing, stridor or respiratory distress  CV: Extremities are warm and well-perfused, no cyanosis, no pedal edema  Abdominal: Normal bowel sounds, soft, nontender, no masses  Musculoskeletal: Normal bulk and tone  Skin: Normal color and turgor  Neuro: A/Ox3, CN II-XII intact, normal gait    Imaging/Path/Labs   Imagin23  MRI Prostate  IMPRESSION:  1. Based on the most suspicious abnormality, this exam is  characterized as PIRADS 5 - Clinically significant cancer is highly  likely to be present. The most suspicious abnormality is located at  the right base and mid gland transitional zone involving the AFMS and  there is long segment capsular abutment indicating moderate suspicion  of minimal extraprostatic extension.  2. No suspicious adenopathy or evidence of pelvic metastases.      Path:   23  A. Prostate, Target 1: Right Base TZ 10:00-11:00, Core Needle Biopsy:  -  Prostatic adenocarcinoma, acinar type  - Jovani score 7 (3+4; 10% pattern 4)  - Grade group 2  - Extent: 2 of 2 cores (9 mm, 80%; 8 mm, 70%)      B. Prostate, Left Base, Core Needle Biopsy:  - Benign prostate tissue      C. Prostate, Left Mid, Core Needle Biopsy:  - Prostatic adenocarcinoma, acinar type  - Myra score 6 (3+3)  - Grade group 1  - Extent: 1 of 2 cores (2 mm; 30%)      D. Prostate, Left [place], Core Needle Biopsy:  - Prostatic adenocarcinoma, acinar type  - Myra score 6 (3+3)  - Grade group 1  - Extent: 1 of 2 cores (1 mm; 10%)      E. Prostate, Right Base, Core Needle Biopsy:  - Benign prostate tissue      F.  Prostate, Right Mid, Core Needle Biopsy:  - Benign prostate tissue      G. Prostate, Right [place], Core Needle Biopsy:  - Benign prostate tissue      H. Prostate, Left Transitional Zone, Core Needle Biopsy:  - Benign prostate tissue      I. Prostate, Right Transitional Zone, Core Needle Biopsy:  - Prostatic adenocarcinoma, acinar type  - Jovani score 7 (3+4; 10% pattern 4)  - Grade group 2  - Extent: 1 of 1 cores (12 mm, 90%)     Labs:   Prostate Specific Antigen Screen   Date Value Ref Range Status   01/20/2023 6.26 (H) 0.00 - 4.50 ng/mL Final     PSA Tumor Marker   Date Value Ref Range Status   04/17/2023 5.30 (H) 0.00 - 4.50 ng/mL Final           Assessment    Mr. Matias is a 68 year old male with a diagnosis of  adenocarcinoma of the prostate, Jovani score 3+4 = 7, PSA = 6.26, clinical T1 cN0 M0.  Evaluate role for radiation therapy.    Plan   1.  The patient has favorable intermediate risk prostate cancer based upon his Jovani 3+4=7 pathology. I would expect his life expectancy to be greater than 10 years. As such, we recommend definitive treatment.      2. We discussed the results of the PIVOT trial,  in which surgery was associated with lower all-cause mortality than observation among men with intermediate-risk disease.     3. We discussed definitive treatment options  including surgery vs. radiation therapy. Radiation therapy techniques discussed included EBRT with conventional vs. hypofractionation. The patient has met with urology team, who does not feel he is an ideal candidate for surgery given his medical comorbidities.   He wishes to undergo a course of EBRT. We also discussed that a short course of ADT in addition to the radiation could be considered. In general,  the addition of a short course of ADT to the radiation has been associated with an biochemical control benefit and possibly OS compared to RT alone in patients with intermediate risk disease. However, given favorable intermediate risk disease, the benefit of ADT may not outweigh its risks.     4. We discussed radiation side effects of treatment including fatigue, urinary obstructive symptoms, loose stool, proctitis, cystitis, and erectile dysfunction.      5.  We discussed that we would recommend hypofractionated RT over 5.5 weeks.      6. We discussed  fiducial placement/ Spacer OAR gel placement at rectal-prostate interface prior to initiation of RT.      7. The patient wishes to think about his options. He will also meet with Dr. Almendarez on 8/7/23 to re-discuss potential treatment options. We will also plan to see him in about 1 month to help finalize next steps.       Jaren Ohara MD  Department of Radiation Oncology  [facility]

## 2023-07-10 NOTE — NURSING NOTE
"REASON FOR APPOINTMENT   Type of Cancer: Favorable intermediate risk prostate cancer  Location: prostate  Date of Symptom Onset: rising psa    TREATMENT TO-DATE FOR THIS CANCER  Surgery ? Discussion ongoing with Dr. Almendarez - pt still gathering info on treatment options   Chemotherapy ? None indicated   Other Treatments for this Cancer ? Discussion today about treatment options of SpaceOAR/fiducial and external beam radiation treatment    PERSONAL HISTORY OF CANCER   Previous Cancer ? no   Prior Radiation ? no   Prior Chemotherapy ? no   Prior Hormonal Therapy ? no     RECENT IMAGING STUDIES  MRI    REFERRALS NEEDED  None at this time    VITALS  BP (!) 145/82 (BP Location: Left arm, Patient Position: Chair, Cuff Size: Adult Regular)   Pulse 66   Resp 12   Wt 72.5 kg (159 lb 12.8 oz)   SpO2 97%   BMI 24.66 kg/m      PACEMAKER/IMPLANTED CARDIAC DEVICE no    PAIN  Denies    PSYCHOSOCIAL  Marital Status:   Patient lives in [place] with wife, Ro.  Number of children:   Working status:   Do you feel safe in your home? Yes    REVIEW OF SYSTEMS  Skin: negative  Eyes: glasses  Ears/Nose/Throat: negative  Respiratory: No shortness of breath, dyspnea on exertion, cough, or hemoptysis  Cardiovascular: negative  Gastrointestinal: negative  Genitourinary: negative and erectile dysfunction  Musculoskeletal: negative  Neurologic: negative  Psychiatric: negative  Hematologic/Lymphatic/Immunologic: negative  Endocrine: negative      Radiation Oncology Patient Teaching    Current Concern: \" I read the Dr. Buchanan book which was very helpful. I am still gathering info and thinking about what to do\"    Person involved with teaching: Patient and Wife  Patient asked Questions: Yes  Patient was cooperative: Yes  Patient was receptive (willing to accept information given): Yes    Education Assessment  Comprehension ability: Medium  Knowledge level: Medium  Factors affecting teaching: None    Education Materials " Given  Radiation Therapy and You  Radiaiton to the pelvis    Educational Topics Discussed  Side effects, Medications, Activity, Nutrition, Adjustment to illness and When to call MD/RN    Response To Teaching  More review necessary    Do you have an advanced directive or living will? No  Are you DNR/DNI? No

## 2023-07-27 ASSESSMENT — SLEEP AND FATIGUE QUESTIONNAIRES
HOW LIKELY ARE YOU TO NOD OFF OR FALL ASLEEP IN A CAR, WHILE STOPPED FOR A FEW MINUTES IN TRAFFIC: WOULD NEVER DOZE
HOW LIKELY ARE YOU TO NOD OFF OR FALL ASLEEP WHILE SITTING QUIETLY AFTER LUNCH WITHOUT ALCOHOL: SLIGHT CHANCE OF DOZING
HOW LIKELY ARE YOU TO NOD OFF OR FALL ASLEEP WHILE SITTING AND TALKING TO SOMEONE: WOULD NEVER DOZE
HOW LIKELY ARE YOU TO NOD OFF OR FALL ASLEEP WHILE WATCHING TV: SLIGHT CHANCE OF DOZING
HOW LIKELY ARE YOU TO NOD OFF OR FALL ASLEEP WHEN YOU ARE A PASSENGER IN A CAR FOR AN HOUR WITHOUT A BREAK: WOULD NEVER DOZE
HOW LIKELY ARE YOU TO NOD OFF OR FALL ASLEEP WHILE SITTING AND READING: SLIGHT CHANCE OF DOZING
HOW LIKELY ARE YOU TO NOD OFF OR FALL ASLEEP WHILE LYING DOWN TO REST IN THE AFTERNOON WHEN CIRCUMSTANCES PERMIT: MODERATE CHANCE OF DOZING
HOW LIKELY ARE YOU TO NOD OFF OR FALL ASLEEP WHILE SITTING INACTIVE IN A PUBLIC PLACE: WOULD NEVER DOZE

## 2023-08-01 ENCOUNTER — PRE VISIT (OUTPATIENT)
Dept: UROLOGY | Facility: CLINIC | Age: 68
End: 2023-08-01
Payer: COMMERCIAL

## 2023-08-01 NOTE — TELEPHONE ENCOUNTER
Reason for visit: follow up      Dx/Hx/Sx: prostate cancer     Records/imaging/labs/orders: in epic     At Rooming: video visit

## 2023-08-02 NOTE — PROGRESS NOTES
Does Dillon have a CPAP/Bipap?  No     [place] Sleep Scale:  5    Sleep Study Follow-Up Visit:    Date on this visit: 8/3/2023    Dillon Matias comes in today for follow-up of his sleep study done on 5/5/2023  These findings were reviewed with patient.   Data: A full night home sleep study was performed recording the standard physiologic parameters including body position, movement, sound, nasal pressure, thermal oral airflow, chest and abdominal movements with respiratory inductance plethysmography, and oxygen saturation by pulse oximetry. Pulse rate was estimated by oximetry recording. This study was considered adequate based on > 4 hours of quality oximetry and respiratory recording. As specified by the AASM Manual for the Scoring of Sleep and Associated events, version 2.3, Rule VIII.D 1B, 4% oxygen desaturation scoring for hypopneas is used as a standard of care on all home sleep apnea testing.     Analysis Time:  414 minutes     Respiration:   Sleep Associated Hypoxemia: sustained hypoxemia was not present. Baseline oxygen saturation was 93.3%.  Time with saturation less than or equal to 88% was 0.2 minutes. The lowest oxygen saturation was 86%.   Snoring: Snoring was present.  Respiratory events: The home study revealed a presence of 10 obstructive apneas and 10 mixed and central apneas. There were 25 hypopneas resulting in a combined apnea/hypopnea index [AHI] of 6.5 events per hour.  AHI was 74.2 per hour supine, 10.1 per hour prone, 5.3 per hour on left side, and 2.7 per hour on right side.   Pattern: Excluding events noted above, respiratory rate and pattern was Normal.     Position: Percent of time spent: supine - 3.3%, prone - 5.7%, on left - 38%, on right - 52.9%.     Heart Rate: By pulse oximetry normal rate was noted.      Assessment:   Mild obstructive sleep apnea. There was minimal supine sleep which may have underestimated the severity of EVANGELISTA.  Sleep associated hypoxemia was not present.      Recommendations:  Consider auto-CPAP at 5-15 cmH2O, oral appliance therapy or positional therapy.  Suggest optimizing sleep hygiene and avoiding sleep deprivation.  Past medical/surgical history, family history, social history, medications and allergies were reviewed.      Problem List:  Patient Active Problem List    Diagnosis Date Noted    Actinic keratosis 02/07/2020     Priority: Medium    Chronic left shoulder pain 02/07/2020     Priority: Medium    History of tobacco abuse 02/07/2020     Priority: Medium    Other eczema 02/07/2020     Priority: Medium    Coronary artery disease involving native coronary artery of native heart without angina pectoris 06/15/2015     Priority: Medium    Dyslipidemia 06/10/2015     Priority: Medium        Impression/Plan:    Today we discussed the results of sleep study with the patient.  He certainly is very interested in positional therapy.  However according to his wife he also snores loudly in any position.  At this point we discussed some treatment options.  Patient will undergo positional therapy but also is interested in trial of oral appliance.  We will refer him to the [place] [facility] to be evaluated for oral appliance.  He will follow up with me in about 4 month(s).     20 minutes spent with patient, all of which were spent face-to-face counseling, consulting, coordinating plan of care.      Mitch Erazo MD      CC: Antonio Hernandez

## 2023-08-03 ENCOUNTER — OFFICE VISIT (OUTPATIENT)
Dept: SLEEP MEDICINE | Facility: CLINIC | Age: 68
End: 2023-08-03
Payer: COMMERCIAL

## 2023-08-03 VITALS
HEART RATE: 59 BPM | OXYGEN SATURATION: 93 % | SYSTOLIC BLOOD PRESSURE: 121 MMHG | WEIGHT: 159 LBS | DIASTOLIC BLOOD PRESSURE: 64 MMHG | BODY MASS INDEX: 24.1 KG/M2 | HEIGHT: 68 IN

## 2023-08-03 DIAGNOSIS — G47.19 EXCESSIVE DAYTIME SLEEPINESS: ICD-10-CM

## 2023-08-03 DIAGNOSIS — G47.33 OSA (OBSTRUCTIVE SLEEP APNEA): Primary | ICD-10-CM

## 2023-08-03 PROCEDURE — 99213 OFFICE O/P EST LOW 20 MIN: CPT | Performed by: OTOLARYNGOLOGY

## 2023-08-04 NOTE — PROGRESS NOTES
Writer faxed office notes, face sheet, oral appliance order and sleep study to [facility] per patient request.     Mohamud LIVE CMA

## 2023-08-07 ENCOUNTER — VIRTUAL VISIT (OUTPATIENT)
Dept: UROLOGY | Facility: CLINIC | Age: 68
End: 2023-08-07
Payer: COMMERCIAL

## 2023-08-07 VITALS — BODY MASS INDEX: 24.1 KG/M2 | HEIGHT: 68 IN | WEIGHT: 159 LBS

## 2023-08-07 DIAGNOSIS — C61 PROSTATE CANCER (H): Primary | ICD-10-CM

## 2023-08-07 PROCEDURE — 99215 OFFICE O/P EST HI 40 MIN: CPT | Mod: VID | Performed by: UROLOGY

## 2023-08-07 ASSESSMENT — PAIN SCALES - GENERAL: PAINLEVEL: NO PAIN (0)

## 2023-08-07 NOTE — PROGRESS NOTES
Dillon is a 68 year old who is being evaluated via a billable video visit.      How would you like to obtain your AVS? MyChart  If the video visit is dropped, the invitation should be resent by: Text to cell phone: [phone]  Will anyone else be joining your video visit? No            Video-Visit Details    Type of service:  Video Visit   Video Start Time: 1:58pm     CC: biopsy result    HPI: 67 yo male with Gl 3+4=7 prostate cancer diagnosed on 6/12/23.  Has been counseled on various treatment options and has seen Dr. Ohara in radiation oncology.  The visit today is to discuss options further.    ASSESSMENT AND PLAN:  Over half of today's 47-minute visit was spent reviewing the chart, results and counseling the patient regarding his prostate cancer.  We again discussed options of observation vs surgery vs radiation and we also discussed the VAPOR 2 clinical trial.  The patient might not be a candidate for the trial due to his PIRADS 5 lesion on his MRI.  However, if he wishes to pursue enrollment and have his MRI re-read centrally to see if it would be downgraded to a PIRADS 4, that is possible.  If he is ineligible for the trial he could still move forward with definitive therapy.  The patient is otherwise thinking about radiation.  The patient wishes to hear more about the trial.  We will have the  meet with him.    Video End Time:2:24pm    Originating Location (pt. Location): Home    Distant Location (provider location):  Off-site  Platform used for Video Visit: Atooma

## 2023-08-07 NOTE — NURSING NOTE
Is the patient currently in the state of [place]? YES    Visit mode:VIDEO    If the visit is dropped, the patient can be reconnected by: VIDEO VISIT: Send to e-mail at: [email or web address]    Will anyone else be joining the visit? NO      How would you like to obtain your AVS? MyChart    Are changes needed to the allergy or medication list? NO    Reason for visit: Follow Up

## 2023-08-07 NOTE — PROGRESS NOTES
"Virtual Visit Details    Type of service:  Video Visit   Video Start Time: {video visit start/end time for provider to select:660897}  Video End Time:{video visit start/end time for provider to select:113650}    Originating Location (pt. Location): {video visit patient location:150747::\"Home\"}  {PROVIDER LOCATION On-site should be selected for visits conducted from your clinic location or adjoining [facility] hospital, academic office, or other nearby [facility] building. Off-site should be selected for all other provider locations, including home:615854}  Distant Location (provider location):  {virtual location provider:630150}  Platform used for Video Visit: {Virtual Visit Platforms:592878::\"[place]in\"}  "

## 2023-08-07 NOTE — LETTER
8/7/2023       RE: Dillon Matias  [address] [id number]     Dear Colleague,    Thank you for referring your patient, Dillon Matias, to the [facility] UROLOGY CLINIC [place] [facility]. Please see a copy of my visit note below.    Dillon is a 68 year old who is being evaluated via a billable video visit.      How would you like to obtain your AVS? MyChart  If the video visit is dropped, the invitation should be resent by: Text to cell phone: [phone]  Will anyone else be joining your video visit? No            Video-Visit Details    Type of service:  Video Visit   Video Start Time: 1:58pm     CC: biopsy result    HPI: 67 yo male with Gl 3+4=7 prostate cancer diagnosed on 6/12/23.  Has been counseled on various treatment options and has seen Dr. Ohara in radiation oncology.  The visit today is to discuss options further.    ASSESSMENT AND PLAN:  Over half of today's 47-minute visit was spent reviewing the chart, results and counseling the patient regarding his prostate cancer.  We again discussed options of observation vs surgery vs radiation and we also discussed the VAPOR 2 clinical trial.  The patient might not be a candidate for the trial due to his PIRADS 5 lesion on his MRI.  However, if he wishes to pursue enrollment and have his MRI re-read centrally to see if it would be downgraded to a PIRADS 4, that is possible.  If he is ineligible for the trial he could still move forward with definitive therapy.  The patient is otherwise thinking about radiation.  The patient wishes to hear more about the trial.  We will have the  meet with him.    Video End Time:2:24pm    Originating Location (pt. Location): Home    Distant Location (provider location):  Off-site  Platform used for Video Visit: Kathleen    Sincerely,    Michael Almendarez MD

## 2023-08-10 ENCOUNTER — OFFICE VISIT (OUTPATIENT)
Dept: RADIATION THERAPY | Facility: OUTPATIENT CENTER | Age: 68
End: 2023-08-10
Payer: COMMERCIAL

## 2023-08-10 VITALS
OXYGEN SATURATION: 98 % | SYSTOLIC BLOOD PRESSURE: 117 MMHG | HEART RATE: 67 BPM | RESPIRATION RATE: 18 BRPM | DIASTOLIC BLOOD PRESSURE: 65 MMHG

## 2023-08-10 DIAGNOSIS — C61 PROSTATE CANCER (H): Primary | ICD-10-CM

## 2023-08-10 ASSESSMENT — PAIN SCALES - GENERAL: PAINLEVEL: NO PAIN (0)

## 2023-08-10 NOTE — LETTER
8/10/2023         RE: Dillon Matias  [address] [address] [address]        Dear Colleague,    Thank you for referring your patient, Dillon Matias, to the [facility] RADIATION THERAPY CLINIC. Please see a copy of my visit note below.    Radiation Oncology Progress Note    HPI: Mr. Matias is a 68 year old male with a diagnosis of  adenocarcinoma of the prostate, Sloughhouse score 3+4 = 7, PSA = 6.26, clinical T1 cN0 M0.  Evaluate role for radiation therapy.    The patient presents for follow up and to re-discuss next steps.     He met with Dr. Almendarez to re-discuss surgical options.     No changes in health since he was last seen in our clinic.       Plan:  1.  The patient has favorable intermediate risk prostate cancer based upon his Jovani 3+4=7 pathology. I would expect his life expectancy to be greater than 10 years. As such, we recommend definitive treatment.      2. We re-discussed the results of the PIVOT trial,  in which surgery was associated with lower all-cause mortality than observation among men with intermediate-risk disease.     3. We re-discussed definitive treatment options including surgery vs. radiation therapy. Radiation therapy techniques discussed included EBRT with conventional vs. hypofractionation. The patient has met with urology team, who does not feel he is an ideal candidate for surgery given his medical comorbidities.   He wishes to undergo a course of EBRT. We also discussed that a short course of ADT in addition to the radiation could be considered. In general,  the addition of a short course of ADT to the radiation has been associated with an biochemical control benefit and possibly OS compared to RT alone in patients with intermediate risk disease. However, given favorable intermediate risk disease, the benefit of ADT may not outweigh its risks.     4. We re-discussed radiation side effects of treatment including fatigue, urinary obstructive symptoms, loose stool,  proctitis, cystitis, and erectile dysfunction.      5.  We re-discussed that we would recommend hypofractionated RT over 5.5 weeks.      6. We re-discussed fiducial placement/ Spacer OAR gel placement at rectal-prostate interface prior to initiation of RT.    7. The patient wishes to proceed with radiation therapy as definitive treatment at this time. We will coordinate with Dr. Almendarez's team fiducial placement/ Spacer OAR gel placement prior to RT start and subsequently schedule CT simulation. Patient agrees with the current plan in place.    Jaren Ohara M.D.  Department of Radiation Oncology  [facility]         Again, thank you for allowing me to participate in the care of your patient.        Sincerely,        Jaren Ohara MD

## 2023-08-10 NOTE — PROGRESS NOTES
Radiation Oncology Progress Note    HPI: Mr. Matias is a 68 year old male with a diagnosis of  adenocarcinoma of the prostate, Jovani score 3+4 = 7, PSA = 6.26, clinical T1 cN0 M0.  Evaluate role for radiation therapy.    The patient presents for follow up and to re-discuss next steps.     He met with Dr. Almendarez to re-discuss surgical options.     No changes in health since he was last seen in our clinic.       Plan:  1.  The patient has favorable intermediate risk prostate cancer based upon his Jovani 3+4=7 pathology. I would expect his life expectancy to be greater than 10 years. As such, we recommend definitive treatment.      2. We re-discussed the results of the PIVOT trial,  in which surgery was associated with lower all-cause mortality than observation among men with intermediate-risk disease.     3. We re-discussed definitive treatment options including surgery vs. radiation therapy. Radiation therapy techniques discussed included EBRT with conventional vs. hypofractionation. The patient has met with urology team, who does not feel he is an ideal candidate for surgery given his medical comorbidities.   He wishes to undergo a course of EBRT. We also discussed that a short course of ADT in addition to the radiation could be considered. In general,  the addition of a short course of ADT to the radiation has been associated with an biochemical control benefit and possibly OS compared to RT alone in patients with intermediate risk disease. However, given favorable intermediate risk disease, the benefit of ADT may not outweigh its risks.     4. We re-discussed radiation side effects of treatment including fatigue, urinary obstructive symptoms, loose stool, proctitis, cystitis, and erectile dysfunction.      5.  We re-discussed that we would recommend hypofractionated RT over 5.5 weeks.      6. We re-discussed fiducial placement/ Spacer OAR gel placement at rectal-prostate interface prior to initiation of  RT.    7. The patient wishes to proceed with radiation therapy as definitive treatment at this time. We will coordinate with Dr. Almendarez's team fiducial placement/ Spacer OAR gel placement prior to RT start and subsequently schedule CT simulation. Patient agrees with the current plan in place.    Jaren Ohara M.D.  Department of Radiation Oncology  [facility]

## 2023-08-10 NOTE — NURSING NOTE
"Oncology Rooming Note    August 10, 2023 8:50 AM   Dillon Matias is a 68 year old male who presents for:    Chief Complaint   Patient presents with    Radiation Therapy     Follow up     Initial Vitals: /65   Pulse 67   Resp 18   SpO2 98%  Estimated body mass index is 24.52 kg/m  as calculated from the following:    Height as of 8/7/23: 1.715 m (5' 7.52\").    Weight as of 8/7/23: 72.1 kg (159 lb). There is no height or weight on file to calculate BSA.  No Pain (0) Comment: Data Unavailable   No LMP for male patient.  Allergies reviewed: Yes  Medications reviewed: Yes    Medications: Medication refills not needed today.  Pharmacy name entered into [employer]:    [id number] - [place], [place] - [address]  [facility] - [place], [place] - [address]    Clinical concerns: patient with prostate cancer - here to continue discussion of option of radiatoin therapy for treatment.  If he proceeds with radiation he would need SpaceOAR/fiducial and then simulation/MRI Patient interested in radiaiton. He does have some questions about a trial.  MD was notified.      Laurie Werner RN             "

## 2023-08-12 ENCOUNTER — PREP FOR PROCEDURE (OUTPATIENT)
Dept: SURGERY | Facility: CLINIC | Age: 68
End: 2023-08-12
Payer: COMMERCIAL

## 2023-08-12 ENCOUNTER — PREP FOR PROCEDURE (OUTPATIENT)
Dept: UROLOGY | Facility: CLINIC | Age: 68
End: 2023-08-12
Payer: COMMERCIAL

## 2023-08-12 DIAGNOSIS — C61 PROSTATE CANCER (H): Primary | ICD-10-CM

## 2023-08-12 RX ORDER — CEFAZOLIN SODIUM 2 G/50ML
2 SOLUTION INTRAVENOUS
Status: CANCELLED | OUTPATIENT
Start: 2023-08-12

## 2023-08-12 RX ORDER — CEFAZOLIN SODIUM 2 G/50ML
2 SOLUTION INTRAVENOUS SEE ADMIN INSTRUCTIONS
Status: CANCELLED | OUTPATIENT
Start: 2023-08-12

## 2023-08-16 ENCOUNTER — TRANSFERRED RECORDS (OUTPATIENT)
Dept: HEALTH INFORMATION MANAGEMENT | Facility: CLINIC | Age: 68
End: 2023-08-16

## 2023-08-22 ENCOUNTER — MEDICAL CORRESPONDENCE (OUTPATIENT)
Dept: HEALTH INFORMATION MANAGEMENT | Facility: CLINIC | Age: 68
End: 2023-08-22
Payer: COMMERCIAL

## 2023-08-23 ENCOUNTER — TELEPHONE (OUTPATIENT)
Dept: UROLOGY | Facility: CLINIC | Age: 68
End: 2023-08-23
Payer: COMMERCIAL

## 2023-08-23 NOTE — TELEPHONE ENCOUNTER
FUTURE VISIT INFORMATION      SURGERY INFORMATION:  Date: 23  Location: [facility] or  Surgeon:  Michael Almendarez MD   Anesthesia Type:  general  Procedure: Transrectal ultrasound guided placement of fiducials and SpaceOar   Consult: virtual visit 23    RECORDS REQUESTED FROM:       [facility] Care [facility]    Pertinent Medical History: CAD    Most recent EKG+ Tracin23    Most recent ECHO: 23    Most recent Cardiac Stress Test: 23

## 2023-08-23 NOTE — TELEPHONE ENCOUNTER
Patient is scheduled for a surgical procedure with Dr. Almendarez      Spoke with: Patient via phone      Date of Surgery/Procedure: Friday September 08, 2023    [facility]      Informed patient they will need an adult : Yes     Pre-op: Yes     PAC EVAL: Tuesday August 29, 2023    Additional imaging/appointments:     Additional comments:      Surgery packet: jeff     Patient is aware that surgery time is tentative to change and to expect a call 3-1 business days from Pre Admission Nursing for instructions and arrival time

## 2023-08-24 ENCOUNTER — TELEPHONE (OUTPATIENT)
Dept: RADIATION THERAPY | Facility: OUTPATIENT CENTER | Age: 68
End: 2023-08-24

## 2023-08-24 DIAGNOSIS — C61 PROSTATE CANCER (H): Primary | ICD-10-CM

## 2023-08-28 DIAGNOSIS — C61 PROSTATE CANCER (H): Primary | ICD-10-CM

## 2023-08-29 ENCOUNTER — PRE VISIT (OUTPATIENT)
Dept: SURGERY | Facility: CLINIC | Age: 68
End: 2023-08-29

## 2023-08-29 ENCOUNTER — VIRTUAL VISIT (OUTPATIENT)
Dept: SURGERY | Facility: CLINIC | Age: 68
End: 2023-08-29
Payer: COMMERCIAL

## 2023-08-29 ENCOUNTER — ANESTHESIA EVENT (OUTPATIENT)
Dept: SURGERY | Facility: CLINIC | Age: 68
End: 2023-08-29
Payer: COMMERCIAL

## 2023-08-29 DIAGNOSIS — C61 PROSTATE CANCER (H): ICD-10-CM

## 2023-08-29 DIAGNOSIS — Z01.818 PREOP EXAMINATION: Primary | ICD-10-CM

## 2023-08-29 PROCEDURE — 99203 OFFICE O/P NEW LOW 30 MIN: CPT | Mod: VID

## 2023-08-29 ASSESSMENT — COPD QUESTIONNAIRES: COPD: 0

## 2023-08-29 ASSESSMENT — LIFESTYLE VARIABLES: TOBACCO_USE: 1

## 2023-08-29 ASSESSMENT — ENCOUNTER SYMPTOMS
ORTHOPNEA: 0
SEIZURES: 0

## 2023-08-29 NOTE — H&P
Pre-Operative H & P     CC:  Preoperative exam to assess for increased cardiopulmonary risk while undergoing surgery and anesthesia.    Date of Encounter: 8/29/2023  Primary Care Physician:  Antonio Hernandez     Reason for visit:   Encounter Diagnoses   Name Primary?    Preop examination Yes    Prostate cancer (H)        HPI  Dillon Matias is a 68 year old male who presents for pre-operative H & P in preparation for  Procedure Information       Case: [id number] Date/Time: 09/08/23 1640    Procedure: Transrectal ultrasound guided placement of fiducials and SpaceOar (Perineum)    Anesthesia type: General    Diagnosis: Prostate cancer (H) [C61]    Pre-op diagnosis: Prostate cancer (H) [C61]    Location: [facility] / [facility]    Providers: Michael Almendarez MD            Dillon Matias is a 69 y/o male with a PMH significant for HLD, CAD s/p MARE (2015), former tobacco use, and history of BCC who has prostate cancer. He has been counseled on various treatment options and is now scheduled for the surgery listed above. He is also following with Dr. Ohara in radiation oncology.     History is obtained from the patient and chart review    Hx of abnormal bleeding or anti-platelet use: Asprin 81mg      Past Medical History  Past Medical History:   Diagnosis Date    Actinic keratosis     Basal cell carcinoma     Heart disease 07/2015       Past Surgical History  Past Surgical History:   Procedure Laterality Date    COLONOSCOPY      ORTHOPEDIC SURGERY  08/2010    RIGHT KNEE       Prior to Admission Medications  Current Outpatient Medications   Medication Sig Dispense Refill    aspirin (ASA) 81 MG chewable tablet Take 1 tablet (81 mg) by mouth daily (Patient taking differently: Take 81 mg by mouth every evening) 90 tablet 3    atorvastatin (LIPITOR) 80 MG tablet Take 1 tablet (80 mg) by mouth daily (Patient taking differently: Take 80 mg by mouth every evening) 90 tablet 3    Multiple Vitamin (MULTIVITAMIN ADULT PO) Take 1  tablet by mouth every morning      triamcinolone (KENALOG) 0.1 % external cream Apply to AA on the leg BID x 1-2 week then PRN only 454 g 2       Allergies  No Known Allergies    Social History  Social History     Socioeconomic History    Marital status:      Spouse name: Not on file    Number of children: Not on file    Years of education: Not on file    Highest education level: Not on file   Occupational History    Not on file   Tobacco Use    Smoking status: Former     Packs/day: 1.00     Years: 45.00     Pack years: 45.00     Types: Cigarettes     Start date: 1970     Quit date: 7/15/2015     Years since quittin.1     Passive exposure: Past    Smokeless tobacco: Never   Vaping Use    Vaping Use: Never used   Substance and Sexual Activity    Alcohol use: Not Currently    Drug use: Not Currently    Sexual activity: Not Currently     Partners: Female     Birth control/protection: Post-menopausal   Other Topics Concern    Not on file   Social History Narrative    Not on file     Social Determinants of Health     Financial Resource Strain: Not on file   Food Insecurity: Not on file   Transportation Needs: Not on file   Physical Activity: Not on file   Stress: Not on file   Social Connections: Not on file   Intimate Partner Violence: Not on file   Housing Stability: Not on file       Family History  Family History   Problem Relation Age of Onset    Anesthesia Reaction No family hx of     Venous thrombosis No family hx of        Review of Systems  The complete review of systems is negative other than noted in the HPI or here.   Anesthesia Evaluation   Pt has had prior anesthetic.     No history of anesthetic complications       ROS/MED HX  ENT/Pulmonary:     (+) sleep apnea, doesn't use CPAP,              tobacco use, Past use,                   (-) asthma, COPD and recent URI   Neurologic:  - neg neurologic ROS  (-) no seizures and no CVA   Cardiovascular:     (+) Dyslipidemia - -  CAD - past MI -  stent-2015.  Drug Eluting Stent.                               Previous cardiac testing   Echo: Date: 2015 Results:   Final Conclusion    Normal left ventricular size and systolic function. Visually estimated ejection fraction is   60-65%.    No obvious regional wall motion abnormalities.    Mild right ventricular dilatation. Mild right ventricular hypokinesis.    Impella device appears to be sub-optimally positioned- currently laterally (perhaps in   papillary muscle), and the inlet appears to be in    the aorta.    Catheter/device wire in the right ventricular cavity. IABP not identified on this study.    Findings relayed to the Interventionist.    Estimated EF: 60-65%     Stress Test:  Date: 12/18/20 Results:  Normal treadmill stress echo without ischemia      ECG Reviewed:  Date: 4/17/23 Results:  SR  Cath:  Date: 2015 Results:  Summary/Conclusions   PRESENTATION / INDICATIONS   ? Non STEMI   ? Cardiogenic shock   DIAGNOSTIC - CORONARY   ? Right dominant coronary artery system   ? The LAD has minimal disease.   ? The circumflex artery has no significant obstruction.   ? Acute total occlusion of the proximal ramus   HEMODYNAMICS   ? Normal right heart filling pressures noted   INTERVENTION   ? Successful angioplasty and stenting (Promus drug eluting) of the proximal right coronary artery   ? Intra-aortic balloon pump inserted for maximum coronary artery perfusion and hemodynamic stabilization   ? Impella cardia assist devise inserted for hemodynamic stabilization   CASE NOTES   ? Integrin given during interventional procedure    (-) MURRIETA and orthopnea/PND   METS/Exercise Tolerance: >4 METS Comment: - Exercising daily. Walking miles continuously without exertional symptoms.    Hematologic:  - neg hematologic  ROS  (-) history of blood clots and history of blood transfusion   Musculoskeletal:  - neg musculoskeletal ROS     GI/Hepatic:  - neg GI/hepatic ROS  (-) GERD and liver disease   Renal/Genitourinary: Comment:  - Prostate cancer   (-) renal disease   Endo:  - neg endo ROS  (-) Type II DM and chronic steroid usage   Psychiatric/Substance Use:  - neg psychiatric ROS     Infectious Disease:  - neg infectious disease ROS  (-) Recent Fever   Malignancy:   (+) Malignancy, History of Prostate and Skin.Prostate CA Active status post.  Skin CA Remission status post Surgery.      Other:            Virtual visit -  No vitals were obtained    Physical Exam  Constitutional: Awake, alert, cooperative, no apparent distress, and appears stated age.  Eyes: Pupils equal  HENT: Normocephalic  Respiratory: non labored breathing   Neurologic: Awake, alert, oriented to name, place and time.   Neuropsychiatric: Calm, cooperative. Normal affect.      Prior Labs/Diagnostic Studies   All labs and imaging personally reviewed     Labs ordered today: CBC, BMP, Dr. Almendarez's UA order. Patient will make a lab appointment at a [facility] lab this week.     EKG/ stress test - if available please see in ROS above     The patient's records and results personally reviewed by this provider.     Outside records reviewed from: Care Everywhere      Assessment    Dillon Matias is a 68 year old male seen as a PAC referral for risk assessment and optimization for anesthesia.    Plan/Recommendations  Pt will be optimized for the proposed procedure.  See below for details on the assessment, risk, and preoperative recommendations    NEUROLOGY  - No history of TIA, CVA or seizure  -Post Op delirium risk factors:  Age    ENT  - No current airway concerns.  Will need to be reassessed day of surgery.  Mallampati: Unable to assess  TM: Unable to assess    CARDIAC  - CAD. History of NSTEMI in 2015 s/p MARE to RCA. He is following with Dr. Nunez with cardiology, last on 4/17/23, with plan to follow-up in 1 year. The patient works out daily and denies any symptoms of CP, chest tightness, or SOB.   Hold aspirin x 7 days prior to surgery.   - Hyperlipidemia managed on atorvastatin.  "  - METS (Metabolic Equivalents)  Patient performs 4 or more METS exercise without symptoms            Total Score: 0      RCRI-Low risk: Class 2 0.9% complication rate            Total Score: 1    RCRI: CAD        PULMONARY  - Obstructive Sleep Apnea. Patient reports mild EVANGELISTA per sleep study. He has never used a CPAP but is currently in the process for getting a mouth guard.   EVANGELISTA without home CPAP use.    - Denies asthma or inhaler use  - Former smoker (quit in 2015). Patient denies any symptoms of wheezing or SOB.   - Tobacco History    History   Smoking Status    Former    Packs/day: 1.00    Years: 45.00    Types: Cigarettes    Start date: 6/1/1970    Quit date: 7/15/2015   Smokeless Tobacco    Never       GI  PONV Low Risk  Total Score: 1           1 AN PONV: Patient is not a current smoker        /RENAL  - Baseline Creatinine  1.0  - Prostate cancer (see HPI) - surgery planned as above.     ENDOCRINE    - BMI: Estimated body mass index is 24.52 kg/m  as calculated from the following:    Height as of 8/7/23: 1.715 m (5' 7.52\").    Weight as of 8/7/23: 72.1 kg (159 lb).  Healthy Weight (BMI 18.5-24.9)  - No history of Diabetes Mellitus    HEME  VTE High Risk 3%            Total Score: 8    VTE: Greater than 59 yrs old    VTE: Male    VTE: Current cancer      - Platelet disfunction second to Aspirin (Carlyn, many others)        Different anesthesia methods/types have been discussed with the patient, but they are aware that the final plan will be decided by the assigned anesthesia provider on the date of service.    The patient is optimized for their procedure. AVS with information on surgery time/arrival time, meds and NPO status given by nursing staff. No further diagnostic testing indicated.    Please refer to the physical examination documented by the anesthesiologist in the anesthesia record on the day of surgery.    Video-Visit Details    Type of service:  Video Visit    Provider received verbal consent for " a Video Visit from the patient? Yes     Originating Location (pt. Location): Home    Distant Location (provider location):  Off-site  Mode of Communication:  Video Conference via [employer]  On the day of service:     Prep time: 21 minutes  Visit time: 7 minutes  Documentation time: 13 minutes  ------------------------------------------  Total time: 41 minutes      SRIRAM Saldivar CNP  Preoperative Assessment Center  [facility]  Cl[facility] and [facility] Center  Phone: [phone]  Fax: [phone]

## 2023-08-29 NOTE — PROGRESS NOTES
Dillon is a 68 year old who is being evaluated via a billable video visit.      How would you like to obtain your AVS? MyChart      Subjective   Dillon is a 68 year old, presenting for the following health issues:  Pre-Op Exam (/)    HPI           Review of Systems       Physical Exam     MIRIAN Aguayo LPN

## 2023-08-29 NOTE — PATIENT INSTRUCTIONS
Preparing for Your Surgery      Name:  Dillon Matias   MRN:  [id number]   :  1955   Today's Date:  2023       Arriving for surgery:  Surgery date:  23  Arrival time:  2:40PM    Please come to:     Please come to:      [facility] [place] [address]  [address] [place]  [address]  The Green Ramp for patients and visitors is located beneath the [facility]. The parking facility entrance is at the intersection of [address] [place] [address] and [address]. Patients and visitors who self-park will receive the reduced hospital parking rate (no ticket validation needed).  Takkle parking, located at the [facility] main entrance on [address] [place] [address], is available Monday - Friday from 7 am to 3:30 pm.  Discounted parking pass options can be purchased from  attendants during business hours.  -Check in at the security desk in the [facility] ([facility]) Lobby. They will direct you to the correct elevators.  -Proceed to the 3rd floor, check in at the Adult Surgery Waiting Lounge. [phone]  If you are in need of directions, a wheelchair or escort please stop at the Information Desk in the lobby.  Inform the information person that you are here for surgery; a wheelchair and escort to [address] will be provided.   An escort to the Adult Surgery Waiting Lounge will be provided.    What can I eat or drink?  -  You may eat and drink normally up to 8 hours prior to arrival time. (Until 23, 6:40AM)  -  You may have clear liquids until 2 hours prior to arrival time. (Until 23, 12:40PM)    Examples of clear liquids:  Water  Clear broth  Juices (apple, white grape, white cranberry  and cider) without pulp  Noncarbonated, powder based beverages  (lemonade and Cl-Aid)  Sodas (Sprite, 7-Up, ginger ale and seltzer)  Coffee or tea (without milk or cream)  Gatorade    -  No Alcohol or  cannabis products for at least 24 hours before surgery.     Which medicines can I take?    Hold Aspirin for 7 days before surgery.   Hold Multivitamins for 7 days before surgery.  Hold Supplements for 7 days before surgery.  Hold Ibuprofen (Advil, Motrin) for 1 day(s) before surgery--unless otherwise directed by surgeon.  Hold Naproxen (Aleve) for 4 days before surgery.      How do I prepare myself?  - Please take 2 showers (one the night prior to surgery and one the morning of surgery) using Scrubcare or Hibiclens soap.    Use this soap only from the neck to your toes.     Leave the soap on your skin for one minute--then rinse thoroughly.      You may use your own shampoo and conditioner. No other hair products.   - Please remove all jewelry and body piercings.  - No lotions, deodorants or fragrance.  - Bring your ID and insurance card.    -If you have a Deep Brain Stimulator, Spinal Cord Stimulator, or any Neuro Stimulator device---you must bring the remote control to the hospital.      ALL PATIENTS GOING HOME THE SAME DAY OF SURGERY ARE REQUIRED TO HAVE A RESPONSIBLE ADULT TO DRIVE AND BE IN ATTENDANCE WITH THEM FOR 24 HOURS FOLLOWING SURGERY.    Covid testing policy as of 12/06/2022  Your surgeon will notify and schedule you for a COVID test if one is needed before surgery--please direct any questions or COVID symptoms to your surgeon      Questions or Concerns:    - For any questions regarding the day of surgery or your hospital stay, please contact the Pre Admission Nursing Office at [phone].       - If you have health changes between today and your surgery, please call your surgeon.       - For questions after surgery, please call your surgeons office.           Current Visitor Guidelines    You may have 2 visitors in the pre op area.    Visiting hours: 8 a.m. to 8:30 p.m.    You may have four visitors during your inpatient hospital stay.    Patients confirmed or suspected to have symptoms of COVID 19 or  flu:     No visitors allowed for adult patients.   Children (under age 18) can have 1 named visitor.     People who are sick or showing symptoms of COVID 19 or flu:    Are not allowed to visit patients--we can only make exceptions in special situations.       Please follow these guidelines for your visit:          Please maintain social distance          Masking is optional--however at times you may be asked to wear a mask for the safety of yourself and others     Clean your hands with alcohol hand . Do this when you arrive at and leave the building and patient room,    And again after you touch your mask or anything in the room.     Go directly to and from the room you are visiting.     Stay in the patient s room during your visit. Limit going to other places in the hospital as much as possible     Leave bags and jackets at home or in the car.     For everyone s health, please don t come and go during your visit. That includes for smoking   during your visit.

## 2023-09-03 ENCOUNTER — LAB (OUTPATIENT)
Dept: LAB | Facility: CLINIC | Age: 68
End: 2023-09-03
Payer: COMMERCIAL

## 2023-09-03 DIAGNOSIS — Z01.818 PREOP EXAMINATION: ICD-10-CM

## 2023-09-03 DIAGNOSIS — C61 PROSTATE CANCER (H): ICD-10-CM

## 2023-09-03 LAB
ALBUMIN UR-MCNC: NEGATIVE MG/DL
ANION GAP SERPL CALCULATED.3IONS-SCNC: 8 MMOL/L (ref 7–15)
APPEARANCE UR: CLEAR
BACTERIA #/AREA URNS HPF: ABNORMAL /HPF
BILIRUB UR QL STRIP: NEGATIVE
BUN SERPL-MCNC: 13.1 MG/DL (ref 8–23)
CALCIUM SERPL-MCNC: 9.6 MG/DL (ref 8.8–10.2)
CHLORIDE SERPL-SCNC: 105 MMOL/L (ref 98–107)
COLOR UR AUTO: YELLOW
CREAT SERPL-MCNC: 0.88 MG/DL (ref 0.67–1.17)
DEPRECATED HCO3 PLAS-SCNC: 26 MMOL/L (ref 22–29)
ERYTHROCYTE [DISTWIDTH] IN BLOOD BY AUTOMATED COUNT: 12.9 % (ref 10–15)
GFR SERPL CREATININE-BSD FRML MDRD: >90 ML/MIN/1.73M2
GLUCOSE SERPL-MCNC: 99 MG/DL (ref 70–99)
GLUCOSE UR STRIP-MCNC: NEGATIVE MG/DL
HCT VFR BLD AUTO: 43.2 % (ref 40–53)
HGB BLD-MCNC: 14.5 G/DL (ref 13.3–17.7)
HGB UR QL STRIP: NEGATIVE
KETONES UR STRIP-MCNC: NEGATIVE MG/DL
LEUKOCYTE ESTERASE UR QL STRIP: NEGATIVE
MCH RBC QN AUTO: 29.7 PG (ref 26.5–33)
MCHC RBC AUTO-ENTMCNC: 33.6 G/DL (ref 31.5–36.5)
MCV RBC AUTO: 89 FL (ref 78–100)
NITRATE UR QL: NEGATIVE
PH UR STRIP: 7 [PH] (ref 5–7)
PLATELET # BLD AUTO: 182 10E3/UL (ref 150–450)
POTASSIUM SERPL-SCNC: 4 MMOL/L (ref 3.4–5.3)
RBC # BLD AUTO: 4.88 10E6/UL (ref 4.4–5.9)
RBC #/AREA URNS AUTO: ABNORMAL /HPF
SODIUM SERPL-SCNC: 139 MMOL/L (ref 136–145)
SP GR UR STRIP: 1.01 (ref 1–1.03)
SQUAMOUS #/AREA URNS AUTO: ABNORMAL /LPF
UROBILINOGEN UR STRIP-ACNC: 0.2 E.U./DL
WBC # BLD AUTO: 6 10E3/UL (ref 4–11)
WBC #/AREA URNS AUTO: ABNORMAL /HPF

## 2023-09-03 PROCEDURE — 81001 URINALYSIS AUTO W/SCOPE: CPT

## 2023-09-03 PROCEDURE — 36415 COLL VENOUS BLD VENIPUNCTURE: CPT

## 2023-09-03 PROCEDURE — 85027 COMPLETE CBC AUTOMATED: CPT

## 2023-09-03 PROCEDURE — 80048 BASIC METABOLIC PNL TOTAL CA: CPT

## 2023-09-08 ENCOUNTER — ANESTHESIA (OUTPATIENT)
Dept: SURGERY | Facility: CLINIC | Age: 68
End: 2023-09-08
Payer: COMMERCIAL

## 2023-09-08 ENCOUNTER — HOSPITAL ENCOUNTER (OUTPATIENT)
Facility: CLINIC | Age: 68
Discharge: HOME | End: 2023-09-08
Attending: UROLOGY | Admitting: UROLOGY
Payer: COMMERCIAL

## 2023-09-08 VITALS
OXYGEN SATURATION: 100 % | HEIGHT: 68 IN | SYSTOLIC BLOOD PRESSURE: 138 MMHG | BODY MASS INDEX: 23.82 KG/M2 | RESPIRATION RATE: 12 BRPM | WEIGHT: 157.19 LBS | DIASTOLIC BLOOD PRESSURE: 75 MMHG | HEART RATE: 44 BPM | TEMPERATURE: 97.2 F

## 2023-09-08 DIAGNOSIS — I25.10 CORONARY ARTERY DISEASE INVOLVING NATIVE CORONARY ARTERY OF NATIVE HEART WITHOUT ANGINA PECTORIS: Primary | ICD-10-CM

## 2023-09-08 PROCEDURE — 250N000009 HC RX 250: Performed by: UROLOGY

## 2023-09-08 PROCEDURE — C1889 IMPLANT/INSERT DEVICE, NOC: HCPCS | Performed by: UROLOGY

## 2023-09-08 PROCEDURE — 55874 TPRNL PLMT BIODEGRDABL MATRL: CPT | Mod: GC | Performed by: UROLOGY

## 2023-09-08 PROCEDURE — 258N000003 HC RX IP 258 OP 636: Performed by: NURSE ANESTHETIST, CERTIFIED REGISTERED

## 2023-09-08 PROCEDURE — 999N000141 HC STATISTIC PRE-PROCEDURE NURSING ASSESSMENT: Performed by: UROLOGY

## 2023-09-08 PROCEDURE — 250N000011 HC RX IP 250 OP 636: Performed by: NURSE ANESTHETIST, CERTIFIED REGISTERED

## 2023-09-08 PROCEDURE — 250N000011 HC RX IP 250 OP 636: Performed by: UROLOGY

## 2023-09-08 PROCEDURE — 710N000012 HC RECOVERY PHASE 2, PER MINUTE: Performed by: UROLOGY

## 2023-09-08 PROCEDURE — 55876 PLACE RT DEVICE/MARKER PROS: CPT | Mod: GC | Performed by: UROLOGY

## 2023-09-08 PROCEDURE — 370N000017 HC ANESTHESIA TECHNICAL FEE, PER MIN: Performed by: UROLOGY

## 2023-09-08 PROCEDURE — 360N000075 HC SURGERY LEVEL 2, PER MIN: Performed by: UROLOGY

## 2023-09-08 DEVICE — KIT NDL RAD POLYMARK MARKER PM-1.0-3-18-20: Type: IMPLANTABLE DEVICE | Site: PROSTATE | Status: FUNCTIONAL

## 2023-09-08 RX ORDER — PROPOFOL 10 MG/ML
INJECTION, EMULSION INTRAVENOUS PRN
Status: DISCONTINUED | OUTPATIENT
Start: 2023-09-08 | End: 2023-09-08

## 2023-09-08 RX ORDER — ONDANSETRON 4 MG/1
4 TABLET, ORALLY DISINTEGRATING ORAL EVERY 30 MIN PRN
Status: DISCONTINUED | OUTPATIENT
Start: 2023-09-08 | End: 2023-09-09 | Stop reason: HOSPADM

## 2023-09-08 RX ORDER — ACETAMINOPHEN 325 MG/1
650 TABLET ORAL EVERY 4 HOURS PRN
Qty: 50 TABLET | Refills: 0 | Status: SHIPPED | OUTPATIENT
Start: 2023-09-08

## 2023-09-08 RX ORDER — IBUPROFEN 600 MG/1
600 TABLET, FILM COATED ORAL ONCE
Status: CANCELLED | OUTPATIENT
Start: 2023-09-08 | End: 2023-09-08

## 2023-09-08 RX ORDER — PROPOFOL 10 MG/ML
INJECTION, EMULSION INTRAVENOUS CONTINUOUS PRN
Status: DISCONTINUED | OUTPATIENT
Start: 2023-09-08 | End: 2023-09-08

## 2023-09-08 RX ORDER — FENTANYL CITRATE 50 UG/ML
25 INJECTION, SOLUTION INTRAMUSCULAR; INTRAVENOUS
Status: DISCONTINUED | OUTPATIENT
Start: 2023-09-08 | End: 2023-09-09 | Stop reason: HOSPADM

## 2023-09-08 RX ORDER — FENTANYL CITRATE 50 UG/ML
25 INJECTION, SOLUTION INTRAMUSCULAR; INTRAVENOUS EVERY 5 MIN PRN
Status: DISCONTINUED | OUTPATIENT
Start: 2023-09-08 | End: 2023-09-09 | Stop reason: HOSPADM

## 2023-09-08 RX ORDER — METOPROLOL TARTRATE 1 MG/ML
1-2 INJECTION, SOLUTION INTRAVENOUS EVERY 5 MIN PRN
Status: DISCONTINUED | OUTPATIENT
Start: 2023-09-08 | End: 2023-09-09 | Stop reason: HOSPADM

## 2023-09-08 RX ORDER — CEFAZOLIN SODIUM/WATER 2 G/20 ML
2 SYRINGE (ML) INTRAVENOUS
Status: COMPLETED | OUTPATIENT
Start: 2023-09-08 | End: 2023-09-08

## 2023-09-08 RX ORDER — FENTANYL CITRATE 50 UG/ML
INJECTION, SOLUTION INTRAMUSCULAR; INTRAVENOUS PRN
Status: DISCONTINUED | OUTPATIENT
Start: 2023-09-08 | End: 2023-09-08

## 2023-09-08 RX ORDER — OXYCODONE HYDROCHLORIDE 5 MG/1
5 TABLET ORAL EVERY 4 HOURS PRN
Status: DISCONTINUED | OUTPATIENT
Start: 2023-09-08 | End: 2023-09-09 | Stop reason: HOSPADM

## 2023-09-08 RX ORDER — FENTANYL CITRATE 50 UG/ML
50 INJECTION, SOLUTION INTRAMUSCULAR; INTRAVENOUS EVERY 5 MIN PRN
Status: DISCONTINUED | OUTPATIENT
Start: 2023-09-08 | End: 2023-09-09 | Stop reason: HOSPADM

## 2023-09-08 RX ORDER — HYDROMORPHONE HYDROCHLORIDE 1 MG/ML
0.2 INJECTION, SOLUTION INTRAMUSCULAR; INTRAVENOUS; SUBCUTANEOUS EVERY 5 MIN PRN
Status: DISCONTINUED | OUTPATIENT
Start: 2023-09-08 | End: 2023-09-09 | Stop reason: HOSPADM

## 2023-09-08 RX ORDER — HYDROMORPHONE HYDROCHLORIDE 1 MG/ML
0.4 INJECTION, SOLUTION INTRAMUSCULAR; INTRAVENOUS; SUBCUTANEOUS EVERY 5 MIN PRN
Status: DISCONTINUED | OUTPATIENT
Start: 2023-09-08 | End: 2023-09-09 | Stop reason: HOSPADM

## 2023-09-08 RX ORDER — BACITRACIN ZINC 500 [USP'U]/G
OINTMENT TOPICAL PRN
Status: DISCONTINUED | OUTPATIENT
Start: 2023-09-08 | End: 2023-09-08 | Stop reason: HOSPADM

## 2023-09-08 RX ORDER — OXYCODONE HYDROCHLORIDE 10 MG/1
10 TABLET ORAL EVERY 4 HOURS PRN
Status: DISCONTINUED | OUTPATIENT
Start: 2023-09-08 | End: 2023-09-09 | Stop reason: HOSPADM

## 2023-09-08 RX ORDER — AMOXICILLIN 250 MG
1-2 CAPSULE ORAL 2 TIMES DAILY
Qty: 30 TABLET | Refills: 0 | Status: SHIPPED | OUTPATIENT
Start: 2023-09-08 | End: 2024-01-26

## 2023-09-08 RX ORDER — CEFAZOLIN SODIUM/WATER 2 G/20 ML
2 SYRINGE (ML) INTRAVENOUS SEE ADMIN INSTRUCTIONS
Status: DISCONTINUED | OUTPATIENT
Start: 2023-09-08 | End: 2023-09-08 | Stop reason: HOSPADM

## 2023-09-08 RX ORDER — SODIUM CHLORIDE, SODIUM LACTATE, POTASSIUM CHLORIDE, CALCIUM CHLORIDE 600; 310; 30; 20 MG/100ML; MG/100ML; MG/100ML; MG/100ML
INJECTION, SOLUTION INTRAVENOUS CONTINUOUS
Status: DISCONTINUED | OUTPATIENT
Start: 2023-09-08 | End: 2023-09-09 | Stop reason: HOSPADM

## 2023-09-08 RX ORDER — SODIUM CHLORIDE, SODIUM LACTATE, POTASSIUM CHLORIDE, CALCIUM CHLORIDE 600; 310; 30; 20 MG/100ML; MG/100ML; MG/100ML; MG/100ML
INJECTION, SOLUTION INTRAVENOUS CONTINUOUS PRN
Status: DISCONTINUED | OUTPATIENT
Start: 2023-09-08 | End: 2023-09-08

## 2023-09-08 RX ORDER — ACETAMINOPHEN 325 MG/1
650 TABLET ORAL ONCE
Status: CANCELLED | OUTPATIENT
Start: 2023-09-08 | End: 2023-09-08

## 2023-09-08 RX ORDER — ONDANSETRON 2 MG/ML
4 INJECTION INTRAMUSCULAR; INTRAVENOUS EVERY 30 MIN PRN
Status: DISCONTINUED | OUTPATIENT
Start: 2023-09-08 | End: 2023-09-09 | Stop reason: HOSPADM

## 2023-09-08 RX ORDER — HYDRALAZINE HYDROCHLORIDE 20 MG/ML
2.5-5 INJECTION INTRAMUSCULAR; INTRAVENOUS EVERY 10 MIN PRN
Status: DISCONTINUED | OUTPATIENT
Start: 2023-09-08 | End: 2023-09-09 | Stop reason: HOSPADM

## 2023-09-08 RX ADMIN — SODIUM CHLORIDE, POTASSIUM CHLORIDE, SODIUM LACTATE AND CALCIUM CHLORIDE: 600; 310; 30; 20 INJECTION, SOLUTION INTRAVENOUS at 20:33

## 2023-09-08 RX ADMIN — Medication 2 G: at 20:33

## 2023-09-08 RX ADMIN — FENTANYL CITRATE 25 MCG: 50 INJECTION, SOLUTION INTRAMUSCULAR; INTRAVENOUS at 21:03

## 2023-09-08 RX ADMIN — PROPOFOL 30 MG: 10 INJECTION, EMULSION INTRAVENOUS at 20:54

## 2023-09-08 RX ADMIN — PROPOFOL 120 MCG/KG/MIN: 10 INJECTION, EMULSION INTRAVENOUS at 20:40

## 2023-09-08 RX ADMIN — PROPOFOL 40 MG: 10 INJECTION, EMULSION INTRAVENOUS at 20:40

## 2023-09-08 ASSESSMENT — ACTIVITIES OF DAILY LIVING (ADL)
ADLS_ACUITY_SCORE: 35
ADLS_ACUITY_SCORE: 33

## 2023-09-08 ASSESSMENT — LIFESTYLE VARIABLES: TOBACCO_USE: 1

## 2023-09-08 NOTE — OP NOTE
PREOPERATIVE DIAGNOSIS:    1. Prostate cancer    POSTOPERATIVE DIAGNOSIS: Same as above    PROCEDURES PERFORMED:   1. Transrectal ultrasound-guided placement of fiducial markers and SpaceOAR.    STAFF SURGEON: Michael Almendarez MD PhD    RESIDENT(S):  Stanley Loyola MD    ANESTHESIA: General    ESTIMATED BLOOD LOSS: 1 mL.     DRAINS: None     SPECIMEN(S): None    OPERATIVE INDICATIONS:   Dillon Matias is a(n) 68 year old male with a recent diagnosis of prostate cancer.  The patient has elected external beam radiation therapy as his management strategy.  The patient presents today for transrectal ultrasound-guided placement of SpaceOAR fiducial markers in preparation for his radiation therapy.    DESCRIPTION OF PROCEDURE:   After informed consent was obtained, the patient was brought to the operating room, where he was administered general anesthesia with an LMA.  After suitable level of anesthesia was attained, he was placed in lithotomy position with all pressure points padded.  He was administered preoperative antibiotics.  He was prepped and draped in standard sterile fashion.  Next, the transrectal ultrasound probe was lubricated and placed into the patient's rectum, allowing good visualization of the prostate.  Then under transrectal ultrasound guidance, we advanced the needles containing the fiducial markers into the prostate.  We placed 1 marker in the right base, 1 at the right apex and 1 at the left apex.  These were all placed without difficulty.  We then used a finder needle, which was advanced under transrectal ultrasound guidance into the space between the prostate and the rectum.  After a confirmatory puff of saline, 10 mL of SpaceOAR hydrogel was injected in this space, resulting in an excellent left of the prostate off the rectum.  This was confirmed in both the sagittal and axial views.  The puncture sites were subsequently dressed with bacitracin, gauze and Tegaderm.  The patient was awakened, and he  was brought to the recovery room in stable condition.

## 2023-09-08 NOTE — DISCHARGE INSTRUCTIONS
"You had the placement of a SpaceOar    - Restrict your activities for the first 24 hours after the biopsy.    - For the first few days after the biopsy, drink plenty of water to keep your urine clear and flowing freely.     - It is common to experience some bleeding after a prostate biopsy. You may notice blood in the urine, a bloody discharge in your underwear, or blood in the stool or on the toilet tissue. This generally will last for no more than a few days. Sexual activity can be resumed at your own discretion. Blood in the semen can persist for six weeks or so.    - You will probably have a sore bottom for a day or so after the biopsy. It is helpful to take a hot bath after the biopsy to help relieve the pressure and spasms that may occur. Tylenol (Regular or Extra-Strength) is also helpful.    - Call your primary care provider to touch base regarding your recent procedure.   - Call or return sooner than your regularly scheduled visit      Please call us immediately or present to the ER if you develop any of the following: fever (greater than 101.5), uncontrolled pain, uncontrolled nausea or vomiting, as well as increased redness, swelling, or drainage from your wound.    Phone numbers:   - Monday through Friday 8am to 4:30pm: Call [phone] with questions or to schedule or confirm appointment.    - Nights or weekends: call the after hours emergency pager - [phone] and tell the  \"I would like to page the Urology Resident on call.\"  - For emergencies, call [phone]          Same-Day Surgery   Adult Discharge Orders & Instructions     For 24 hours after surgery:  Get plenty of rest.  A responsible adult must stay with you for at least 24 hours after you leave the hospital.   Pain medication can slow your reflexes. Do not drive or use heavy equipment.  If you have weakness or tingling, don't drive or use heavy equipment until this feeling goes away.  Mixing alcohol and pain medication can cause " dizziness and slow your breathing. It can even be fatal. Do not drink alcohol while taking pain medication.  Avoid strenuous or risky activities.  Ask for help when climbing stairs.   You may feel lightheaded.  If so, sit for a few minutes before standing.  Have someone help you get up.   If you have nausea (feel sick to your stomach), drink only clear liquids such as apple juice, ginger ale, broth or 7-Up.  Rest may also help.  Be sure to drink enough fluids.  Move to a regular diet as you feel able. Take pain medications with a small amount of solid food, such as toast or crackers, to avoid nausea.   A slight fever is normal. Call the doctor if your fever is over 100 F (37.7 C) (taken under the tongue) or lasts longer than 24 hours.  You may have a dry mouth, muscle aches, trouble sleeping or a sore throat.  These symptoms should go away after 24 hours.  Do not make important or legal decisions.   Pain Management:      1. Take pain medication (if prescribed) for pain as directed by your physician.        2. WARNING: If the pain medication you have been prescribed contains Tylenol  (acetaminophen), DO NOT take additional doses of Tylenol (acetaminophen).     Call your doctor for any of the followin.  Signs of infection (fever, growing tenderness at the surgery site, severe pain, a large amount of drainage or bleeding, foul-smelling drainage, redness, swelling).    2.  It has been over 8 to 10 hours since surgery and you are still not able to urinate (pee).    3.  Headache for over 24 hours.    4.  Numbness, tingling or weakness the day after surgery (if you had spinal anesthesia).  To contact a doctor, call Dr Almendarez's office at [phone] (Urology)   or:  '   [phone] and ask for the Resident On Call for:         Urology   (answered 24 hours a day)  '   Emergency Department:  [place] [facility]: [phone]  [place] [facility]: [phone]               Rev. 10/2014

## 2023-09-09 NOTE — ANESTHESIA PREPROCEDURE EVALUATION
Anesthesia Pre-Procedure Evaluation    Patient: Dillon Matias   MRN: [id number] : 1955        Procedure : Procedure(s):  Transrectal Ultrasound Guided Placement of Fiducials and SpaceOar          Past Medical History:   Diagnosis Date    Actinic keratosis     Basal cell carcinoma     Heart disease 2015      Past Surgical History:   Procedure Laterality Date    COLONOSCOPY      ORTHOPEDIC SURGERY  2010    RIGHT KNEE      No Known Allergies   Social History     Tobacco Use    Smoking status: Former     Packs/day: 1.00     Years: 45.00     Pack years: 45.00     Types: Cigarettes     Start date: 1970     Quit date: 7/15/2015     Years since quittin.1     Passive exposure: Past    Smokeless tobacco: Never   Substance Use Topics    Alcohol use: Not Currently      Wt Readings from Last 1 Encounters:   23 71.3 kg (157 lb 3 oz)        Anesthesia Evaluation   Pt has had prior anesthetic.     No history of anesthetic complications       ROS/MED HX  ENT/Pulmonary:     (+)                tobacco use, Past use,                      Neurologic:  - neg neurologic ROS     Cardiovascular:     (+)  - -  CAD - past MI - stent-   Taking blood thinners  Instructions Given to patient: Aspirin only.                            Previous cardiac testing   Echo: Date: Results:    Stress Test:  Date: Results:    ECG Reviewed:  Date: Results:  NSR  Cath:  Date: Results:   (-) murmur   METS/Exercise Tolerance: >4 METS    Hematologic:  - neg hematologic  ROS     Musculoskeletal:  - neg musculoskeletal ROS     GI/Hepatic:  - neg GI/hepatic ROS     Renal/Genitourinary:  - neg Renal ROS     Endo:  - neg endo ROS     Psychiatric/Substance Use:  - neg psychiatric ROS     Infectious Disease:  - neg infectious disease ROS     Malignancy:  - neg malignancy ROS     Other:  - neg other ROS          Physical Exam    Airway        Mallampati: II   TM distance: > 3 FB   Neck ROM: full   Mouth opening: > 3 cm    Respiratory Devices  and Support         Dental     Comment: Teeth examined without any significant abnormality, patient denies loose, missing or chipped teeth or anything removable.         Cardiovascular          Rhythm and rate: regular and normal (-) no murmur    Pulmonary   pulmonary exam normal        breath sounds clear to auscultation           OUTSIDE LABS:  CBC:   Lab Results   Component Value Date    WBC 6.0 09/03/2023    WBC 8.9 12/31/2004    HGB 14.5 09/03/2023    HGB 16.3 12/31/2004    HCT 43.2 09/03/2023    HCT 47.7 12/31/2004     09/03/2023     12/31/2004     BMP:   Lab Results   Component Value Date     09/03/2023     01/20/2023    POTASSIUM 4.0 09/03/2023    POTASSIUM 4.0 01/20/2023    CHLORIDE 105 09/03/2023    CHLORIDE 104 01/20/2023    CO2 26 09/03/2023    CO2 26 01/20/2023    BUN 13.1 09/03/2023    BUN 17.6 01/20/2023    CR 0.88 09/03/2023    CR 1.00 01/20/2023    GLC 99 09/03/2023     (H) 01/20/2023     COAGS: No results found for: PTT, INR, FIBR  POC: No results found for: BGM, HCG, HCGS  HEPATIC:   Lab Results   Component Value Date    ALBUMIN 4.2 01/20/2023    PROTTOTAL 6.9 01/20/2023    ALT 20 01/20/2023    AST 23 01/20/2023    ALKPHOS 150 (H) 01/20/2023    BILITOTAL 0.6 01/20/2023     OTHER:   Lab Results   Component Value Date    SABRA 9.6 09/03/2023    SED 1 12/31/2004       Anesthesia Plan    ASA Status:  3    NPO Status:  NPO Appropriate    Anesthesia Type: MAC.     - Reason for MAC: immobility needed   Induction: Intravenous, Propofol.   Maintenance: TIVA.        Consents    Anesthesia Plan(s) and associated risks, benefits, and realistic alternatives discussed. Questions answered and patient/representative(s) expressed understanding.     - Discussed:     - Discussed with:  Patient      - Extended Intubation/Ventilatory Support Discussed: No.      - Patient is DNR/DNI Status: No     Use of blood products discussed: No .     Postoperative Care    Pain management: IV  analgesics.   PONV prophylaxis: Ondansetron (or other 5HT-3), Background Propofol Infusion     Comments:    Other Comments: Discussed plan for IV anesthetic with native airway. Discussed potential need for conversion to general anesthetic with airway management and potential for recall.         H&P reviewed: Unable to attach H&P to encounter due to EHR limitations. H&P Update: appropriate H&P reviewed, patient examined. No interval changes since H&P (within 30 days).         Singh Paredes MD

## 2023-09-09 NOTE — ANESTHESIA CARE TRANSFER NOTE
Patient: Dillon Matias    Procedure: Procedure(s):  Transrectal Ultrasound Guided Placement of Fiducials and SpaceOar       Diagnosis: Prostate cancer (H) [C61]  Diagnosis Additional Information: No value filed.    Anesthesia Type:   MAC     Note:    Oropharynx: spontaneously breathing  Level of Consciousness: awake  Oxygen Supplementation: face mask    Independent Airway: airway patency satisfactory and stable  Dentition: dentition unchanged  Vital Signs Stable: post-procedure vital signs reviewed and stable  Report to RN Given: handoff report given  Patient transferred to: PACU    Handoff Report: Identifed the Patient, Identified the Reponsible Provider, Reviewed the pertinent medical history, Discussed the surgical course, Reviewed Intra-OP anesthesia mangement and issues during anesthesia, Set expectations for post-procedure period and Allowed opportunity for questions and acknowledgement of understanding      Vitals:  Vitals Value Taken Time   BP 96/61 09/08/23 2125   Temp     Pulse 57 09/08/23 2125   Resp     SpO2 95 % 09/08/23 2126   Vitals shown include unvalidated device data.    Electronically Signed By: SRIRAM Cordero CRNA  September 8, 2023  9:28 PM

## 2023-09-09 NOTE — ANESTHESIA POSTPROCEDURE EVALUATION
Patient: Dillon Matias    Procedure: Procedure(s):  Transrectal Ultrasound Guided Placement of Fiducials and SpaceOar       Anesthesia Type:  MAC    Note:  Disposition: Outpatient   Postop Pain Control: Uneventful            Sign Out: Well controlled pain   PONV: No   Neuro/Psych: Uneventful            Sign Out: Acceptable/Baseline neuro status   Airway/Respiratory: Uneventful            Sign Out: Acceptable/Baseline resp. status   CV/Hemodynamics: Uneventful            Sign Out: Acceptable CV status; No obvious hypovolemia; No obvious fluid overload   Other NRE:    DID A NON-ROUTINE EVENT OCCUR? No           Last vitals:  Vitals Value Taken Time   /75 09/08/23 2200   Temp 36.2  C (97.2  F) 09/08/23 2200   Pulse 44 09/08/23 2155   Resp 12 09/08/23 2122   SpO2 100 % 09/08/23 2200   Vitals shown include unvalidated device data.    Electronically Signed By: Singh Paredes MD  September 9, 2023  2:59 AM

## 2023-09-12 ENCOUNTER — OFFICE VISIT (OUTPATIENT)
Dept: RADIATION THERAPY | Facility: OUTPATIENT CENTER | Age: 68
End: 2023-09-12
Payer: COMMERCIAL

## 2023-09-12 ENCOUNTER — HOSPITAL ENCOUNTER (OUTPATIENT)
Dept: MRI IMAGING | Facility: CLINIC | Age: 68
Discharge: HOME | End: 2023-09-12
Attending: RADIOLOGY | Admitting: RADIOLOGY
Payer: COMMERCIAL

## 2023-09-12 DIAGNOSIS — C61 PROSTATE CANCER (H): ICD-10-CM

## 2023-09-12 DIAGNOSIS — C61 PROSTATE CANCER (H): Primary | ICD-10-CM

## 2023-09-12 PROCEDURE — 72195 MRI PELVIS W/O DYE: CPT | Mod: TC,52

## 2023-09-12 NOTE — PROGRESS NOTES
The patient presents for CT simulation.    In the interval he underwent SpaceOAR AR and prostate fiducial placement on 9/8/2023 by Dr. Almendarez's team.     Tolerated procedure well.  No issues.    Side effects re-discussed. Consent obtained.    Jraen Ohara M.D.  Department of Radiation Oncology  [facility]

## 2023-09-12 NOTE — LETTER
9/12/2023         RE: Dillon Matias  [id number] [address]        Dear Colleague,    Thank you for referring your patient, Dillon Matias, to the [facility]. Please see a copy of my visit note below.    The patient presents for CT simulation.    In the interval he underwent SpaceOAR AR and prostate fiducial placement on 9/8/2023 by Dr. Almendarez's team.     Tolerated procedure well.  No issues.    Side effects re-discussed. Consent obtained.    Jaren Ohara M.D.  Department of Radiation Oncology  [facility]

## 2023-09-20 ENCOUNTER — DOCUMENTATION ONLY (OUTPATIENT)
Dept: OTHER | Facility: CLINIC | Age: 68
End: 2023-09-20
Payer: COMMERCIAL

## 2023-09-26 ENCOUNTER — APPOINTMENT (OUTPATIENT)
Dept: RADIATION THERAPY | Facility: OUTPATIENT CENTER | Age: 68
End: 2023-09-26
Payer: COMMERCIAL

## 2023-09-27 ENCOUNTER — APPOINTMENT (OUTPATIENT)
Dept: RADIATION THERAPY | Facility: OUTPATIENT CENTER | Age: 68
End: 2023-09-27
Payer: COMMERCIAL

## 2023-09-27 ENCOUNTER — OFFICE VISIT (OUTPATIENT)
Dept: RADIATION THERAPY | Facility: OUTPATIENT CENTER | Age: 68
End: 2023-09-27
Payer: COMMERCIAL

## 2023-09-27 VITALS
SYSTOLIC BLOOD PRESSURE: 146 MMHG | BODY MASS INDEX: 24.18 KG/M2 | DIASTOLIC BLOOD PRESSURE: 76 MMHG | WEIGHT: 159 LBS | HEART RATE: 54 BPM

## 2023-09-27 DIAGNOSIS — C61 PROSTATE CANCER (H): Primary | ICD-10-CM

## 2023-09-27 NOTE — PROGRESS NOTES
[facility]  SPECIALIZING IN BREAKTHROUGHS  Radiation Oncology    On Treatment Visit Note      Dillon Matias      Date: 2023   MRN: [id number]   : 1955  Diagnosis: Prostate cancer      Reason for Visit:  On Radiation Treatment Visit     Treatment Summary to Date  Treatment Site: pelvis/prostate Current Dose: 500/7000 cGy Fractions:       Chemotherapy  Chemo concurrent with radx?: No    Subjective:     Doing well.  No acute complaints.  No diarrhea.  No  bother.  Energy is good.    Nursing ROS:   Nutrition Alteration  Diet Type: Patient's Preference  Skin  Skin Reaction: 0 - No changes     ENT and Mouth Exam  ENT/Mouth Note: no oral issues  Cardiovascular  Respiratory effort: 1 - Normal - without distress  Gastrointestinal  GI Note: bowels normal/regular - no issues  Genitourinary   Note: nocturia x 1     Pain Assessment  Pain Note: no pain issues      Objective:   BP (!) 146/76   Pulse 54   Wt 72.1 kg (159 lb)   BMI 24.18 kg/m    NAD     Labs:  CBC RESULTS:   Recent Labs   Lab Test 23  0952   WBC 6.0   RBC 4.88   HGB 14.5   HCT 43.2   MCV 89   MCH 29.7   MCHC 33.6   RDW 12.9        ELECTROLYTES:  Recent Labs   Lab Test 23  0952      POTASSIUM 4.0   CHLORIDE 105   SABRA 9.6   CO2 26   BUN 13.1   CR 0.88   GLC 99       Assessment:  Mr. Matias is a 68 year old male with a diagnosis of  adenocarcinoma of the prostate, Jovani score 3+4 = 7, PSA = 6.26, clinical T1 cN0 M0.  He is undergoing definitive radiation therapy.       Tolerating radiation therapy well.  All questions and concerns addressed.    Plan:   Continue current therapy.        Mosaiq chart and setup information reviewed  Ports checked    Medication Review  Med list reviewed with patient?: Yes           Jaren Ohara MD

## 2023-09-27 NOTE — LETTER
2023         RE: Dillon Matias  [id number] [facility] [address]        Dear Colleague,    Thank you for referring your patient, Dillon Matias, to the  [facility]. Please see a copy of my visit note below.    [facility]  SPECIALIZING IN BREAKTHROUGHS  Radiation Oncology    On Treatment Visit Note      Dillon Matias      Date: 2023   MRN: [id number]   : 1955  Diagnosis: Prostate cancer      Reason for Visit:  On Radiation Treatment Visit     Treatment Summary to Date  Treatment Site: pelvis/prostate Current Dose: 500/7000 cGy Fractions:       Chemotherapy  Chemo concurrent with radx?: No    Subjective:     Doing well.  No acute complaints.  No diarrhea.  No  bother.  Energy is good.    Nursing ROS:   Nutrition Alteration  Diet Type: Patient's Preference  Skin  Skin Reaction: 0 - No changes     ENT and Mouth Exam  ENT/Mouth Note: no oral issues  Cardiovascular  Respiratory effort: 1 - Normal - without distress  Gastrointestinal  GI Note: bowels normal/regular - no issues  Genitourinary   Note: nocturia x 1     Pain Assessment  Pain Note: no pain issues      Objective:   BP (!) 146/76   Pulse 54   Wt 72.1 kg (159 lb)   BMI 24.18 kg/m    NAD     Labs:  CBC RESULTS:   Recent Labs   Lab Test 23  0952   WBC 6.0   RBC 4.88   HGB 14.5   HCT 43.2   MCV 89   MCH 29.7   MCHC 33.6   RDW 12.9        ELECTROLYTES:  Recent Labs   Lab Test 23  0952      POTASSIUM 4.0   CHLORIDE 105   SABRA 9.6   CO2 26   BUN 13.1   CR 0.88   GLC 99       Assessment:  Mr. Matias is a 68 year old male with a diagnosis of  adenocarcinoma of the prostate, Jovani score 3+4 = 7, PSA = 6.26, clinical T1 cN0 M0.  He is undergoing definitive radiation therapy.       Tolerating radiation therapy well.  All questions and concerns addressed.    Plan:   Continue current therapy.        Mosaiq chart and setup information reviewed  Ports checked    Medication  Review  Med list reviewed with patient?: Yes           Jaren Ohara MD

## 2023-09-28 ENCOUNTER — APPOINTMENT (OUTPATIENT)
Dept: RADIATION THERAPY | Facility: OUTPATIENT CENTER | Age: 68
End: 2023-09-28
Payer: COMMERCIAL

## 2023-09-29 ENCOUNTER — APPOINTMENT (OUTPATIENT)
Dept: RADIATION THERAPY | Facility: OUTPATIENT CENTER | Age: 68
End: 2023-09-29
Payer: COMMERCIAL

## 2023-10-02 ENCOUNTER — APPOINTMENT (OUTPATIENT)
Dept: RADIATION THERAPY | Facility: OUTPATIENT CENTER | Age: 68
End: 2023-10-02
Payer: COMMERCIAL

## 2023-10-03 ENCOUNTER — APPOINTMENT (OUTPATIENT)
Dept: RADIATION THERAPY | Facility: OUTPATIENT CENTER | Age: 68
End: 2023-10-03
Payer: COMMERCIAL

## 2023-10-04 ENCOUNTER — OFFICE VISIT (OUTPATIENT)
Dept: RADIATION THERAPY | Facility: OUTPATIENT CENTER | Age: 68
End: 2023-10-04
Payer: COMMERCIAL

## 2023-10-04 ENCOUNTER — APPOINTMENT (OUTPATIENT)
Dept: RADIATION THERAPY | Facility: OUTPATIENT CENTER | Age: 68
End: 2023-10-04
Payer: COMMERCIAL

## 2023-10-04 VITALS
BODY MASS INDEX: 24.51 KG/M2 | DIASTOLIC BLOOD PRESSURE: 74 MMHG | SYSTOLIC BLOOD PRESSURE: 124 MMHG | WEIGHT: 161.2 LBS | HEART RATE: 59 BPM

## 2023-10-04 DIAGNOSIS — C61 PROSTATE CANCER (H): Primary | ICD-10-CM

## 2023-10-04 NOTE — PROGRESS NOTES
[facility]  SPECIALIZING IN BREAKTHROUGHS  Radiation Oncology    On Treatment Visit Note      Dillon Matias      Date: 10/4/2023   MRN: [id number]   : 1955  Diagnosis: Prostate cancer      Reason for Visit:  On Radiation Treatment Visit     Treatment Summary to Date  Treatment Site: pelvis/prostate Current Dose: 1750/7000 cGy Fractions:       Chemotherapy  Chemo concurrent with radx?: No    Subjective:     Doing well.  No acute complaints.  No diarrhea.  No  bother.  Mild fatigue.    Nursing ROS:   Nutrition Alteration  Diet Type: Patient's Preference  Skin  Skin Reaction: 0 - No changes     ENT and Mouth Exam  ENT/Mouth Note: no oral issues  Cardiovascular  Respiratory effort: 1 - Normal - without distress  Gastrointestinal  GI Note: bowels normal/regular - no issues  Genitourinary   Note: nocturia x 1     Pain Assessment  Pain Note: no pain issues      Objective:   /74   Pulse 59   Wt 73.1 kg (161 lb 3.2 oz)   BMI 24.51 kg/m    NAD     Labs:  CBC RESULTS:   Recent Labs   Lab Test 23  0952   WBC 6.0   RBC 4.88   HGB 14.5   HCT 43.2   MCV 89   MCH 29.7   MCHC 33.6   RDW 12.9        ELECTROLYTES:  Recent Labs   Lab Test 23  0952      POTASSIUM 4.0   CHLORIDE 105   SABRA 9.6   CO2 26   BUN 13.1   CR 0.88   GLC 99       Assessment:  Mr. Matias is a 68 year old male with a diagnosis of  adenocarcinoma of the prostate, [place] score 3+4 = 7, PSA = 6.26, clinical T1 cN0 M0.  He is undergoing definitive radiation therapy.       Tolerating radiation therapy well.  All questions and concerns addressed.    Plan:   Continue current therapy.        Mosaiq chart and setup information reviewed  Ports checked    Medication Review  Med list reviewed with patient?: Yes           Jaren Ohara MD

## 2023-10-04 NOTE — LETTER
10/4/2023         RE: Dillon Matias  [address] [facility] [address]        Dear Colleague,    Thank you for referring your patient, Dillon Matias, to the  [facility]. Please see a copy of my visit note below.    [facility]  SPECIALIZING IN BREAKTHROUGHS  Radiation Oncology    On Treatment Visit Note      Dillon Matias      Date: 10/4/2023   MRN: [id number]   : 1955  Diagnosis: Prostate cancer      Reason for Visit:  On Radiation Treatment Visit     Treatment Summary to Date  Treatment Site: pelvis/prostate Current Dose: 1750/7000 cGy Fractions:       Chemotherapy  Chemo concurrent with radx?: No    Subjective:     Doing well.  No acute complaints.  No diarrhea.  No  bother.  Mild fatigue.    Nursing ROS:   Nutrition Alteration  Diet Type: Patient's Preference  Skin  Skin Reaction: 0 - No changes     ENT and Mouth Exam  ENT/Mouth Note: no oral issues  Cardiovascular  Respiratory effort: 1 - Normal - without distress  Gastrointestinal  GI Note: bowels normal/regular - no issues  Genitourinary   Note: nocturia x 1     Pain Assessment  Pain Note: no pain issues      Objective:   /74   Pulse 59   Wt 73.1 kg (161 lb 3.2 oz)   BMI 24.51 kg/m    NAD     Labs:  CBC RESULTS:   Recent Labs   Lab Test 23  0952   WBC 6.0   RBC 4.88   HGB 14.5   HCT 43.2   MCV 89   MCH 29.7   MCHC 33.6   RDW 12.9        ELECTROLYTES:  Recent Labs   Lab Test 23  0952      POTASSIUM 4.0   CHLORIDE 105   SABRA 9.6   CO2 26   BUN 13.1   CR 0.88   GLC 99       Assessment:  Mr. Matias is a 68 year old male with a diagnosis of  adenocarcinoma of the prostate, Jovani score 3+4 = 7, PSA = 6.26, clinical T1 cN0 M0.  He is undergoing definitive radiation therapy.       Tolerating radiation therapy well.  All questions and concerns addressed.    Plan:   Continue current therapy.        Mosaiq chart and setup information reviewed  Ports  checked    Medication Review  Med list reviewed with patient?: Yes           Jaren Ohara MD

## 2023-10-05 ENCOUNTER — APPOINTMENT (OUTPATIENT)
Dept: RADIATION THERAPY | Facility: OUTPATIENT CENTER | Age: 68
End: 2023-10-05
Payer: COMMERCIAL

## 2023-10-06 ENCOUNTER — APPOINTMENT (OUTPATIENT)
Dept: RADIATION THERAPY | Facility: OUTPATIENT CENTER | Age: 68
End: 2023-10-06
Payer: COMMERCIAL

## 2023-10-09 ENCOUNTER — APPOINTMENT (OUTPATIENT)
Dept: RADIATION THERAPY | Facility: OUTPATIENT CENTER | Age: 68
End: 2023-10-09
Payer: COMMERCIAL

## 2023-10-09 ENCOUNTER — OFFICE VISIT (OUTPATIENT)
Dept: DERMATOLOGY | Facility: CLINIC | Age: 68
End: 2023-10-09
Payer: COMMERCIAL

## 2023-10-09 DIAGNOSIS — L57.0 ACTINIC KERATOSIS: Primary | ICD-10-CM

## 2023-10-09 PROCEDURE — 17000 DESTRUCT PREMALG LESION: CPT | Performed by: PHYSICIAN ASSISTANT

## 2023-10-09 PROCEDURE — 17003 DESTRUCT PREMALG LES 2-14: CPT | Performed by: PHYSICIAN ASSISTANT

## 2023-10-09 ASSESSMENT — PAIN SCALES - GENERAL: PAINLEVEL: NO PAIN (0)

## 2023-10-09 NOTE — PROGRESS NOTES
HPI:   Chief complaints: Dillon Matias is a pleasant 68 year old male who presents for follow-up PDT on the face and scalp. He reports that everything went well and he does not have any spots he is concerned about.       PHYSICAL EXAM:    There were no vitals taken for this visit.  Skin exam performed as follows: Type 2 skin. Mood appropriate  Alert and Oriented X 3. Well developed, well nourished in no distress.  General appearance: Normal  Head including face: Normal  Eyes: conjunctiva and lids: Normal  Mouth: Lips, teeth, gums: Normal  Neck: Normal  Skin: Scalp and body hair: See below    Pink gritty papule on the left vertex scalp x 1, left parietal scalp x 1, left frontal scalp x 1, left forehead x 1    ASSESSMENT/PLAN:     Actinic keratosis on the left vertex scalp x 1, left parietal scalp x 1, left frontal scalp x 1, left forehead x 1. As precancerous, cryosurgery performed. Advised on blistering and post-op care. Advised if not resolved in 1-2 months to return for evaluation  Rest of face and scalp clear of any actinic keratoses          Follow-up: for FSE/PRN sooner  CC:   Scribed By: Eduarda Gaytan, MS, PACASANDRA

## 2023-10-09 NOTE — LETTER
10/9/2023         RE: Dillon Matias  [address] [facility] [address]        Dear Colleague,    Thank you for referring your patient, Dillon Matias, to the [facility]. Please see a copy of my visit note below.    HPI:   Chief complaints: Dillon Matias is a pleasant 68 year old male who presents for follow-up PDT on the face and scalp. He reports that everything went well and he does not have any spots he is concerned about.       PHYSICAL EXAM:    There were no vitals taken for this visit.  Skin exam performed as follows: Type 2 skin. Mood appropriate  Alert and Oriented X 3. Well developed, well nourished in no distress.  General appearance: Normal  Head including face: Normal  Eyes: conjunctiva and lids: Normal  Mouth: Lips, teeth, gums: Normal  Neck: Normal  Skin: Scalp and body hair: See below    Pink gritty papule on the left vertex scalp x 1, left parietal scalp x 1, left frontal scalp x 1, left forehead x 1    ASSESSMENT/PLAN:     Actinic keratosis on the left vertex scalp x 1, left parietal scalp x 1, left frontal scalp x 1, left forehead x 1. As precancerous, cryosurgery performed. Advised on blistering and post-op care. Advised if not resolved in 1-2 months to return for evaluation  Rest of face and scalp clear of any actinic keratoses          Follow-up: for FSE/PRN sooner  CC:   Scribed By: Eduarda Gaytan, MS, PACASANDRA      Again, thank you for allowing me to participate in the care of your patient.        Sincerely,        Eduarda Gaytan PA-C

## 2023-10-10 ENCOUNTER — APPOINTMENT (OUTPATIENT)
Dept: RADIATION THERAPY | Facility: OUTPATIENT CENTER | Age: 68
End: 2023-10-10
Payer: COMMERCIAL

## 2023-10-11 ENCOUNTER — APPOINTMENT (OUTPATIENT)
Dept: RADIATION THERAPY | Facility: OUTPATIENT CENTER | Age: 68
End: 2023-10-11
Payer: COMMERCIAL

## 2023-10-11 ENCOUNTER — OFFICE VISIT (OUTPATIENT)
Dept: RADIATION THERAPY | Facility: OUTPATIENT CENTER | Age: 68
End: 2023-10-11
Payer: COMMERCIAL

## 2023-10-11 VITALS
WEIGHT: 161 LBS | SYSTOLIC BLOOD PRESSURE: 111 MMHG | BODY MASS INDEX: 24.48 KG/M2 | DIASTOLIC BLOOD PRESSURE: 69 MMHG | HEART RATE: 57 BPM

## 2023-10-11 DIAGNOSIS — C61 PROSTATE CANCER (H): Primary | ICD-10-CM

## 2023-10-11 NOTE — PROGRESS NOTES
[facility]  SPECIALIZING IN BREAKTHROUGHS  Radiation Oncology    On Treatment Visit Note      Dillon Matias      Date: 10/11/2023   MRN: [id number]   : 1955  Diagnosis: Prostate cancer      Reason for Visit:  On Radiation Treatment Visit     Treatment Summary to Date  Treatment Site: pelvis/prostate Current Dose: 3000/7000 cGy Fractions:       Chemotherapy  Chemo concurrent with radx?: No    Subjective:     Doing well.  No acute complaints.  No diarrhea.  No  bother.  Mild fatigue.    Nursing ROS:   Nutrition Alteration  Diet Type: Patient's Preference  Skin  Skin Reaction: 0 - No changes     ENT and Mouth Exam  ENT/Mouth Note: no oral issues  Cardiovascular  Respiratory effort: 1 - Normal - without distress  Gastrointestinal  GI Note: bowels normal/regular - no issues  Genitourinary   Note: nocturia x 1     Pain Assessment  Pain Note: no pain issues      Objective:   /69   Pulse 57   Wt 73 kg (161 lb)   BMI 24.48 kg/m    NAD     Labs:  CBC RESULTS:   Recent Labs   Lab Test 23  0952   WBC 6.0   RBC 4.88   HGB 14.5   HCT 43.2   MCV 89   MCH 29.7   MCHC 33.6   RDW 12.9        ELECTROLYTES:  Recent Labs   Lab Test 23  0952      POTASSIUM 4.0   CHLORIDE 105   SABRA 9.6   CO2 26   BUN 13.1   CR 0.88   GLC 99       Assessment:  Mr. Matias is a 68 year old male with a diagnosis of  adenocarcinoma of the prostate, [place] score 3+4 = 7, PSA = 6.26, clinical T1 cN0 M0.  He is undergoing definitive radiation therapy.       Tolerating radiation therapy well.  All questions and concerns addressed.    Plan:   Continue current therapy.        Mosaiq chart and setup information reviewed  Ports checked    Medication Review  Med list reviewed with patient?: Yes           Jaren Ohara MD       No

## 2023-10-11 NOTE — LETTER
10/11/2023         RE: Dillon Matias  [address] [facility] [address]        Dear Colleague,    Thank you for referring your patient, Dillon Matias, to the  [facility]. Please see a copy of my visit note below.    [facility]  SPECIALIZING IN BREAKTHROUGHS  Radiation Oncology    On Treatment Visit Note      Dillon Matias      Date: 10/11/2023   MRN: [id number]   : 1955  Diagnosis: Prostate cancer      Reason for Visit:  On Radiation Treatment Visit     Treatment Summary to Date  Treatment Site: pelvis/prostate Current Dose: 3000/7000 cGy Fractions:       Chemotherapy  Chemo concurrent with radx?: No    Subjective:     Doing well.  No acute complaints.  No diarrhea.  No  bother.  Mild fatigue.    Nursing ROS:   Nutrition Alteration  Diet Type: Patient's Preference  Skin  Skin Reaction: 0 - No changes     ENT and Mouth Exam  ENT/Mouth Note: no oral issues  Cardiovascular  Respiratory effort: 1 - Normal - without distress  Gastrointestinal  GI Note: bowels normal/regular - no issues  Genitourinary   Note: nocturia x 1     Pain Assessment  Pain Note: no pain issues      Objective:   /69   Pulse 57   Wt 73 kg (161 lb)   BMI 24.48 kg/m    NAD     Labs:  CBC RESULTS:   Recent Labs   Lab Test 23  0952   WBC 6.0   RBC 4.88   HGB 14.5   HCT 43.2   MCV 89   MCH 29.7   MCHC 33.6   RDW 12.9        ELECTROLYTES:  Recent Labs   Lab Test 23  0952      POTASSIUM 4.0   CHLORIDE 105   SABRA 9.6   CO2 26   BUN 13.1   CR 0.88   GLC 99       Assessment:  Mr. Matias is a 68 year old male with a diagnosis of  adenocarcinoma of the prostate, [place] score 3+4 = 7, PSA = 6.26, clinical T1 cN0 M0.  He is undergoing definitive radiation therapy.       Tolerating radiation therapy well.  All questions and concerns addressed.    Plan:   Continue current therapy.        Mosaiq chart and setup information reviewed  Ports checked    Medication  Review  Med list reviewed with patient?: Yes           Jaren Ohara MD

## 2023-10-12 ENCOUNTER — APPOINTMENT (OUTPATIENT)
Dept: RADIATION THERAPY | Facility: OUTPATIENT CENTER | Age: 68
End: 2023-10-12
Payer: COMMERCIAL

## 2023-10-13 ENCOUNTER — APPOINTMENT (OUTPATIENT)
Dept: RADIATION THERAPY | Facility: OUTPATIENT CENTER | Age: 68
End: 2023-10-13
Payer: COMMERCIAL

## 2023-10-16 ENCOUNTER — APPOINTMENT (OUTPATIENT)
Dept: RADIATION THERAPY | Facility: OUTPATIENT CENTER | Age: 68
End: 2023-10-16
Payer: COMMERCIAL

## 2023-10-16 ENCOUNTER — IMMUNIZATION (OUTPATIENT)
Dept: FAMILY MEDICINE | Facility: CLINIC | Age: 68
End: 2023-10-16
Payer: COMMERCIAL

## 2023-10-16 PROCEDURE — G0008 ADMIN INFLUENZA VIRUS VAC: HCPCS | Mod: 59

## 2023-10-16 PROCEDURE — 90480 ADMN SARSCOV2 VAC 1/ONLY CMP: CPT

## 2023-10-16 PROCEDURE — 90662 IIV NO PRSV INCREASED AG IM: CPT

## 2023-10-16 PROCEDURE — 91320 SARSCV2 VAC 30MCG TRS-SUC IM: CPT

## 2023-10-17 ENCOUNTER — APPOINTMENT (OUTPATIENT)
Dept: RADIATION THERAPY | Facility: OUTPATIENT CENTER | Age: 68
End: 2023-10-17
Payer: COMMERCIAL

## 2023-10-18 ENCOUNTER — APPOINTMENT (OUTPATIENT)
Dept: RADIATION THERAPY | Facility: OUTPATIENT CENTER | Age: 68
End: 2023-10-18
Payer: COMMERCIAL

## 2023-10-18 ENCOUNTER — OFFICE VISIT (OUTPATIENT)
Dept: RADIATION THERAPY | Facility: OUTPATIENT CENTER | Age: 68
End: 2023-10-18
Payer: COMMERCIAL

## 2023-10-18 VITALS — BODY MASS INDEX: 24.78 KG/M2 | WEIGHT: 163 LBS

## 2023-10-18 DIAGNOSIS — C61 PROSTATE CANCER (H): Primary | ICD-10-CM

## 2023-10-18 NOTE — LETTER
10/18/2023         RE: Dillon Matias  [address] [facility] [address]        Dear Colleague,    Thank you for referring your patient, Dillon Matias, to the  [facility]. Please see a copy of my visit note below.    [facility]  SPECIALIZING IN BREAKTHROUGHS  Radiation Oncology    On Treatment Visit Note      Dillon Matias      Date: 10/18/2023   MRN: [id number]   : 1955  Diagnosis: Prostate cancer      Reason for Visit:  On Radiation Treatment Visit     Treatment Summary to Date  Treatment Site: pelvis/prostate Current Dose: 4250/7000 cGy Fractions:       Chemotherapy  Chemo concurrent with radx?: No    Subjective:     Doing well.  No acute complaints.  Mild loose stool.  No  bother.  Mild fatigue.    Nursing ROS:   Nutrition Alteration  Diet Type: Patient's Preference  Skin  Skin Reaction: 0 - No changes     ENT and Mouth Exam  ENT/Mouth Note: no oral issues  Cardiovascular  Respiratory effort: 1 - Normal - without distress  Gastrointestinal  GI Note: bowels normal/regular - no issues  Genitourinary   Note: nocturia x 1     Pain Assessment  Pain Note: no pain issues      Objective:   Wt 73.9 kg (163 lb)   BMI 24.78 kg/m    NAD     Labs:  CBC RESULTS:   Recent Labs   Lab Test 23  0952   WBC 6.0   RBC 4.88   HGB 14.5   HCT 43.2   MCV 89   MCH 29.7   MCHC 33.6   RDW 12.9        ELECTROLYTES:  Recent Labs   Lab Test 23  0952      POTASSIUM 4.0   CHLORIDE 105   SABRA 9.6   CO2 26   BUN 13.1   CR 0.88   GLC 99       Assessment:  Mr. Matias is a 68 year old male with a diagnosis of  adenocarcinoma of the prostate, [place] score 3+4 = 7, PSA = 6.26, clinical T1 cN0 M0.  He is undergoing definitive radiation therapy.       Tolerating radiation therapy well.  All questions and concerns addressed.    Plan:   Continue current therapy.    GI bother. Low fiber diet. Imodium PRN.      Mosaiq chart and setup information reviewed  Ports  checked    Medication Review  Med list reviewed with patient?: Yes           Jaren Ohara MD

## 2023-10-18 NOTE — PROGRESS NOTES
[facility]  SPECIALIZING IN BREAKTHROUGHS  Radiation Oncology    On Treatment Visit Note      Dillon Matias      Date: 10/18/2023   MRN: [id number]   : 1955  Diagnosis: Prostate cancer      Reason for Visit:  On Radiation Treatment Visit     Treatment Summary to Date  Treatment Site: pelvis/prostate Current Dose: 4250/7000 cGy Fractions:       Chemotherapy  Chemo concurrent with radx?: No    Subjective:     Doing well.  No acute complaints.  Mild loose stool.  No  bother.  Mild fatigue.    Nursing ROS:   Nutrition Alteration  Diet Type: Patient's Preference  Skin  Skin Reaction: 0 - No changes     ENT and Mouth Exam  ENT/Mouth Note: no oral issues  Cardiovascular  Respiratory effort: 1 - Normal - without distress  Gastrointestinal  GI Note: bowels normal/regular - no issues  Genitourinary   Note: nocturia x 1     Pain Assessment  Pain Note: no pain issues      Objective:   Wt 73.9 kg (163 lb)   BMI 24.78 kg/m    NAD     Labs:  CBC RESULTS:   Recent Labs   Lab Test 23  0952   WBC 6.0   RBC 4.88   HGB 14.5   HCT 43.2   MCV 89   MCH 29.7   MCHC 33.6   RDW 12.9        ELECTROLYTES:  Recent Labs   Lab Test 23  0952      POTASSIUM 4.0   CHLORIDE 105   SABRA 9.6   CO2 26   BUN 13.1   CR 0.88   GLC 99       Assessment:  Mr. Matias is a 68 year old male with a diagnosis of  adenocarcinoma of the prostate, Jovani score 3+4 = 7, PSA = 6.26, clinical T1 cN0 M0.  He is undergoing definitive radiation therapy.       Tolerating radiation therapy well.  All questions and concerns addressed.    Plan:   Continue current therapy.    GI bother. Low fiber diet. Imodium PRN.      Mosaiq chart and setup information reviewed  Ports checked    Medication Review  Med list reviewed with patient?: Yes           Jaren Ohara MD

## 2023-10-19 ENCOUNTER — APPOINTMENT (OUTPATIENT)
Dept: RADIATION THERAPY | Facility: OUTPATIENT CENTER | Age: 68
End: 2023-10-19
Payer: COMMERCIAL

## 2023-10-20 ENCOUNTER — APPOINTMENT (OUTPATIENT)
Dept: RADIATION THERAPY | Facility: OUTPATIENT CENTER | Age: 68
End: 2023-10-20
Payer: COMMERCIAL

## 2023-10-23 ENCOUNTER — APPOINTMENT (OUTPATIENT)
Dept: RADIATION THERAPY | Facility: OUTPATIENT CENTER | Age: 68
End: 2023-10-23
Payer: COMMERCIAL

## 2023-10-24 ENCOUNTER — APPOINTMENT (OUTPATIENT)
Dept: RADIATION THERAPY | Facility: OUTPATIENT CENTER | Age: 68
End: 2023-10-24
Payer: COMMERCIAL

## 2023-10-25 ENCOUNTER — APPOINTMENT (OUTPATIENT)
Dept: RADIATION THERAPY | Facility: OUTPATIENT CENTER | Age: 68
End: 2023-10-25
Payer: COMMERCIAL

## 2023-10-25 ENCOUNTER — OFFICE VISIT (OUTPATIENT)
Dept: RADIATION THERAPY | Facility: OUTPATIENT CENTER | Age: 68
End: 2023-10-25
Payer: COMMERCIAL

## 2023-10-25 VITALS
WEIGHT: 163 LBS | DIASTOLIC BLOOD PRESSURE: 82 MMHG | HEART RATE: 61 BPM | BODY MASS INDEX: 24.78 KG/M2 | SYSTOLIC BLOOD PRESSURE: 135 MMHG

## 2023-10-25 DIAGNOSIS — C61 PROSTATE CANCER (H): Primary | ICD-10-CM

## 2023-10-25 NOTE — PROGRESS NOTES
[facility]  SPECIALIZING IN BREAKTHROUGHS  Radiation Oncology    On Treatment Visit Note      Dillon Matias      Date: 10/25/2023   MRN: [id number]   : 1955  Diagnosis: Prostate cancer      Reason for Visit:  On Radiation Treatment Visit     Treatment Summary to Date  Treatment Site: pelvis/prostate Current Dose: 5500/7000 cGy Fractions:       Chemotherapy  Chemo concurrent with radx?: No    Subjective:     Doing well.  No acute complaints.  Mild loose stool.  No  bother.  Mild fatigue.    Nursing ROS:   Nutrition Alteration  Diet Type: Patient's Preference  Skin  Skin Reaction: 0 - No changes     ENT and Mouth Exam  ENT/Mouth Note: no oral issues  Cardiovascular  Respiratory effort: 1 - Normal - without distress  Gastrointestinal  GI Note: bowels normal/regular - no issues  Genitourinary   Note: nocturia x 1     Pain Assessment  Pain Note: no pain issues      Objective:   /82   Pulse 61   Wt 73.9 kg (163 lb)   BMI 24.78 kg/m    NAD     Labs:  CBC RESULTS:   Recent Labs   Lab Test 23  0952   WBC 6.0   RBC 4.88   HGB 14.5   HCT 43.2   MCV 89   MCH 29.7   MCHC 33.6   RDW 12.9        ELECTROLYTES:  Recent Labs   Lab Test 23  0952      POTASSIUM 4.0   CHLORIDE 105   SABRA 9.6   CO2 26   BUN 13.1   CR 0.88   GLC 99       Assessment:  Mr. Matias is a 68 year old male with a diagnosis of  adenocarcinoma of the prostate, [place] score 3+4 = 7, PSA = 6.26, clinical T1 cN0 M0.  He is undergoing definitive radiation therapy.       Tolerating radiation therapy well.  All questions and concerns addressed.    Plan:   Continue current therapy.    GI bother. Low fiber diet. Imodium PRN.      Mosaiq chart and setup information reviewed  Ports checked    Medication Review  Med list reviewed with patient?: Yes           Jaren Ohara MD

## 2023-10-25 NOTE — LETTER
10/25/2023         RE: Dillon Matias  [address] [address] [facility] [address]        Dear Colleague,    Thank you for referring your patient, Dillon Matias, to the  [facility]. Please see a copy of my visit note below.    [facility]  SPECIALIZING IN BREAKTHROUGHS  Radiation Oncology    On Treatment Visit Note      Dillon Matias      Date: 10/25/2023   MRN: [id number]   : 1955  Diagnosis: Prostate cancer      Reason for Visit:  On Radiation Treatment Visit     Treatment Summary to Date  Treatment Site: pelvis/prostate Current Dose: 5500/7000 cGy Fractions:       Chemotherapy  Chemo concurrent with radx?: No    Subjective:     Doing well.  No acute complaints.  Mild loose stool.  No  bother.  Mild fatigue.    Nursing ROS:   Nutrition Alteration  Diet Type: Patient's Preference  Skin  Skin Reaction: 0 - No changes     ENT and Mouth Exam  ENT/Mouth Note: no oral issues  Cardiovascular  Respiratory effort: 1 - Normal - without distress  Gastrointestinal  GI Note: bowels normal/regular - no issues  Genitourinary   Note: nocturia x 1     Pain Assessment  Pain Note: no pain issues      Objective:   /82   Pulse 61   Wt 73.9 kg (163 lb)   BMI 24.78 kg/m    NAD     Labs:  CBC RESULTS:   Recent Labs   Lab Test 23  0952   WBC 6.0   RBC 4.88   HGB 14.5   HCT 43.2   MCV 89   MCH 29.7   MCHC 33.6   RDW 12.9        ELECTROLYTES:  Recent Labs   Lab Test 23  0952      POTASSIUM 4.0   CHLORIDE 105   SABRA 9.6   CO2 26   BUN 13.1   CR 0.88   GLC 99       Assessment:  Mr. Matias is a 68 year old male with a diagnosis of  adenocarcinoma of the prostate, [place] score 3+4 = 7, PSA = 6.26, clinical T1 cN0 M0.  He is undergoing definitive radiation therapy.       Tolerating radiation therapy well.  All questions and concerns addressed.    Plan:   Continue current therapy.    GI bother. Low fiber diet. Imodium PRN.      Mosaiq chart and setup  information reviewed  Ports checked    Medication Review  Med list reviewed with patient?: Yes           Jaren Ohara MD        Again, thank you for allowing me to participate in the care of your patient.        Sincerely,        Jaren Ohara MD

## 2023-10-26 ENCOUNTER — APPOINTMENT (OUTPATIENT)
Dept: RADIATION THERAPY | Facility: OUTPATIENT CENTER | Age: 68
End: 2023-10-26
Payer: COMMERCIAL

## 2023-10-27 ENCOUNTER — APPOINTMENT (OUTPATIENT)
Dept: RADIATION THERAPY | Facility: OUTPATIENT CENTER | Age: 68
End: 2023-10-27
Payer: COMMERCIAL

## 2023-10-30 ENCOUNTER — APPOINTMENT (OUTPATIENT)
Dept: RADIATION THERAPY | Facility: OUTPATIENT CENTER | Age: 68
End: 2023-10-30
Payer: COMMERCIAL

## 2023-10-31 ENCOUNTER — APPOINTMENT (OUTPATIENT)
Dept: RADIATION THERAPY | Facility: OUTPATIENT CENTER | Age: 68
End: 2023-10-31
Payer: COMMERCIAL

## 2023-11-01 ENCOUNTER — APPOINTMENT (OUTPATIENT)
Dept: RADIATION THERAPY | Facility: OUTPATIENT CENTER | Age: 68
End: 2023-11-01
Payer: COMMERCIAL

## 2023-11-01 ENCOUNTER — OFFICE VISIT (OUTPATIENT)
Dept: RADIATION THERAPY | Facility: OUTPATIENT CENTER | Age: 68
End: 2023-11-01
Payer: COMMERCIAL

## 2023-11-01 VITALS
RESPIRATION RATE: 18 BRPM | SYSTOLIC BLOOD PRESSURE: 144 MMHG | WEIGHT: 166 LBS | OXYGEN SATURATION: 99 % | BODY MASS INDEX: 25.24 KG/M2 | HEART RATE: 65 BPM | DIASTOLIC BLOOD PRESSURE: 75 MMHG

## 2023-11-01 DIAGNOSIS — C61 PROSTATE CANCER (H): Primary | ICD-10-CM

## 2023-11-01 ASSESSMENT — PAIN SCALES - GENERAL: PAINLEVEL: NO PAIN (0)

## 2023-11-01 NOTE — LETTER
2023         RE: Dillon Matias  [address] [facility] [address] [facility] [address]        Dear Colleague,    Thank you for referring your patient, Dillon Matias, to the [facility]. Please see a copy of my visit note below.    [facility]  SPECIALIZING IN BREAKTHROUGHS  Radiation Oncology    On Treatment Visit Note      Dillon Matias      Date: 2023   MRN: [id number]   : 1955  Diagnosis: Prostate cancer      Reason for Visit:  On Radiation Treatment Visit     Treatment Summary to Date  Treatment Site: pelvis/prostate Current Dose: 6750/7000 cGy Fractions:       Chemotherapy  Chemo concurrent with radx?: No    Subjective:     Doing well with no complaints    Nursing ROS:   Nutrition Alteration  Diet Type: Patient's Preference  Skin  Skin Reaction: 0 - No changes     ENT and Mouth Exam  ENT/Mouth Note: no oral issues  Cardiovascular  Respiratory effort: 1 - Normal - without distress  Gastrointestinal  GI Note: no bowel issues this week\  Genitourinary   Note: nocturia x 1, no changes this week     Pain Assessment  Pain Note: no pain issues      Objective:   BP (!) 144/75   Pulse 65   Resp 18   Wt 75.3 kg (166 lb)   SpO2 99%   BMI 25.24 kg/m    Gen: Appears well, in no acute distress      Labs:  CBC RESULTS:   Recent Labs   Lab Test 23  0952   WBC 6.0   RBC 4.88   HGB 14.5   HCT 43.2   MCV 89   MCH 29.7   MCHC 33.6   RDW 12.9        ELECTROLYTES:  Recent Labs   Lab Test 23  0952      POTASSIUM 4.0   CHLORIDE 105   SABRA 9.6   CO2 26   BUN 13.1   CR 0.88   GLC 99       Assessment:    Tolerating radiation therapy well.  All questions and concerns addressed.    Plan:   Continue current therapy.        Mosaiq chart and setup information reviewed  MVCT/IGRT images checked    Medication Review  Med list reviewed with patient?: Yes             Pritesh Laboy MD    Please do not send letter to referring physician.        Again,  thank you for allowing me to participate in the care of your patient.        Sincerely,        MARISSA Laboy MD

## 2023-11-01 NOTE — PROGRESS NOTES
[facility]  SPECIALIZING IN BREAKTHROUGHS  Radiation Oncology    On Treatment Visit Note      Dillon Matias      Date: 2023   MRN: [id number]   : 1955  Diagnosis: Prostate cancer      Reason for Visit:  On Radiation Treatment Visit     Treatment Summary to Date  Treatment Site: pelvis/prostate Current Dose: 6750/7000 cGy Fractions:       Chemotherapy  Chemo concurrent with radx?: No    Subjective:     Doing well with no complaints    Nursing ROS:   Nutrition Alteration  Diet Type: Patient's Preference  Skin  Skin Reaction: 0 - No changes     ENT and Mouth Exam  ENT/Mouth Note: no oral issues  Cardiovascular  Respiratory effort: 1 - Normal - without distress  Gastrointestinal  GI Note: no bowel issues this week\  Genitourinary   Note: nocturia x 1, no changes this week     Pain Assessment  Pain Note: no pain issues      Objective:   BP (!) 144/75   Pulse 65   Resp 18   Wt 75.3 kg (166 lb)   SpO2 99%   BMI 25.24 kg/m    Gen: Appears well, in no acute distress      Labs:  CBC RESULTS:   Recent Labs   Lab Test 23  0952   WBC 6.0   RBC 4.88   HGB 14.5   HCT 43.2   MCV 89   MCH 29.7   MCHC 33.6   RDW 12.9        ELECTROLYTES:  Recent Labs   Lab Test 23  0952      POTASSIUM 4.0   CHLORIDE 105   SABRA 9.6   CO2 26   BUN 13.1   CR 0.88   GLC 99       Assessment:    Tolerating radiation therapy well.  All questions and concerns addressed.    Plan:   Continue current therapy.        Mosaiq chart and setup information reviewed  MVCT/IGRT images checked    Medication Review  Med list reviewed with patient?: Yes             Pritesh Laboy MD    Please do not send letter to referring physician.

## 2023-11-02 ENCOUNTER — APPOINTMENT (OUTPATIENT)
Dept: RADIATION THERAPY | Facility: OUTPATIENT CENTER | Age: 68
End: 2023-11-02
Payer: COMMERCIAL

## 2023-11-03 ENCOUNTER — DOCUMENTATION ONLY (OUTPATIENT)
Dept: RADIATION THERAPY | Facility: OUTPATIENT CENTER | Age: 68
End: 2023-11-03

## 2023-11-03 NOTE — PROGRESS NOTES
Radiotherapy Treatment Summary              PATIENT: Dillon Matias  MEDICAL RECORD NO: [id number]   : 1955    DIAGNOSIS: Adenocarcinoma of the prostate [place] score 3+4 = 7, PSA = 6.26  INTENT OF RADIOTHERAPY: Adjvuant  PATHOLOGY: Prostate adenocarcinoma, [place] score 3+4 equal 7, 5 cores of 16 cores were involved with cancer.                                STAGE: clinical T1 cN0 M0.   CONCURRENT SYSTEMIC THERAPY: No        ONCOLOGIC HISTORY:          Mr. Matias is a 68 year old male a diagnosis of prostate cancer.      The patient underwent PSA on 2023 which demonstrated a value of 6.26.  Repeat PSA on 2023 demonstrated value 5.3.  MRI of the prostate on 2023 demonstrated a 26cc prostate, with PI-RADS 5 lesion located at the right base and mid gland transitional zone involving the anterior fibromuscular stroma with long segment capsular abutment indicating moderate suspicion of minimal ROMULO.  No lymphadenopathy noted.  The patient underwent prostate biopsy by Dr. Almendarez on 2023.  Pathology demonstrated prostate adenocarcinoma, [place] score 3+4 equal 7, 5 cores of 16 cores were involved with cancer.  Patient met with Dr. Almendarez we discussed different options including surgery versus radiation therapy.  Patient presents to our clinic to discuss potential role of radiation therapy as a part of treatment strategy.    SITE OF TREATMENT: Prostate    DATES  OF TREATMENT: 23 to 23    TOTAL DOSE OF TREATMENT: 7,000 cGy    DOSE PER FRACTION OF TREATMENT: 250 cGy x 28 fractions       COMMENT/TOXICITY: None               PAIN MANAGEMENT:    None                         FOLLOW UP PLAN: One month    CC  Patient Care Team:  Antonio Hernandez PA-C as PCP - General (Physician Assistant)  Antonio Hernandez PA-C as Assigned PCP  Tania Garrett PA-C as Physician Assistant (Urology)  Agustina Nunez MD as MD (Cardiovascular Disease)  Agustina Nunez MD as Assigned Heart and  Vascular Provider  Jaren Ohara MD as Assigned Cancer Care Provider  Eduarda Gaytan PA-C as Assigned Surgical Provider       CHRISTOPH Vazquez  Department of Radiation Oncology  [facility]

## 2023-11-24 ENCOUNTER — DOCUMENTATION ONLY (OUTPATIENT)
Dept: RADIATION ONCOLOGY | Facility: CLINIC | Age: 68
End: 2023-11-24
Payer: COMMERCIAL

## 2023-11-24 NOTE — PROGRESS NOTES
Patient has lab appointment on 12/04/2023 for recheck prior to appointment with you on 12/07/2023. Please place orders for appointment.  Thank you lab

## 2023-11-29 DIAGNOSIS — C61 PROSTATE CANCER (H): Primary | ICD-10-CM

## 2023-12-04 ENCOUNTER — LAB (OUTPATIENT)
Dept: LAB | Facility: CLINIC | Age: 68
End: 2023-12-04
Payer: COMMERCIAL

## 2023-12-04 DIAGNOSIS — C61 PROSTATE CANCER (H): ICD-10-CM

## 2023-12-04 LAB — PSA SERPL DL<=0.01 NG/ML-MCNC: 6.09 NG/ML (ref 0–4.5)

## 2023-12-04 PROCEDURE — 36415 COLL VENOUS BLD VENIPUNCTURE: CPT

## 2023-12-04 PROCEDURE — 84153 ASSAY OF PSA TOTAL: CPT

## 2023-12-07 ENCOUNTER — OFFICE VISIT (OUTPATIENT)
Dept: RADIATION THERAPY | Facility: OUTPATIENT CENTER | Age: 68
End: 2023-12-07
Payer: COMMERCIAL

## 2023-12-07 VITALS
WEIGHT: 167 LBS | DIASTOLIC BLOOD PRESSURE: 75 MMHG | OXYGEN SATURATION: 96 % | RESPIRATION RATE: 16 BRPM | SYSTOLIC BLOOD PRESSURE: 130 MMHG | HEART RATE: 77 BPM | BODY MASS INDEX: 25.39 KG/M2

## 2023-12-07 DIAGNOSIS — C61 PROSTATE CANCER (H): Primary | ICD-10-CM

## 2023-12-07 NOTE — PROGRESS NOTES
Radiation Oncology Note    HPI:Mr. Matias is a 68 year old male with a diagnosis of  adenocarcinoma of the prostate, Jovani score 3+4 = 7, PSA = 6.26, clinical T1 cN0 M0.  He underwent definitive radiation therapy       Radiation Treatment  9/26/23 to 11/2/23:  Prostate, 7000 cGy  in 28 fractions    Patient returns for follow-up.    PSA on 12/4/2023 was slightly up from prior value, current value 6.09, prior value 5.3.  PSA in January 2023 was 6.26.    Patient is overall doing well.  No acute complaints.  No  bother.  No GI bother.  No blood in stool.  No blood in urine.  Energy is good.    /75 (BP Location: Left arm, Cuff Size: Adult Large)   Pulse 77   Resp 16   Wt 75.8 kg (167 lb)   SpO2 96%   BMI 25.39 kg/m    NAD      Plan:  Appropriate recovery post completion of radiation treatment.    PSA did not demonstrate substantial decrease at 1 month visit.  However discussed the PSA value kinetics can take up to 18 months to reach [place].  Additionally, slight increase in PSA value could reflect postinflammatory change.    RTC in 6 months with PSA.    Jaren Ohara M.D.  Department of Radiation Oncology  [facility]

## 2023-12-07 NOTE — NURSING NOTE
FOLLOW-UP VISIT    Patient Name: Dillon Matias      : 1955     Age: 68 year old        ______________________________________________________________________________     Chief Complaint   Patient presents with    Radiation Therapy     Return visit, prostate cancer     /75 (BP Location: Left arm, Cuff Size: Adult Large)   Pulse 77   Resp 16   Wt 75.8 kg (167 lb)   SpO2 96%   BMI 25.39 kg/m       Date Radiation Completed: 2023    Pain  Denies    Meds  Current Med List Reviewed: Yes    Prostate Specific Antigen Screen   Date Value Ref Range Status   2023 6.26 (H) 0.00 - 4.50 ng/mL Final     PSA Tumor Marker   Date Value Ref Range Status   2023 6.09 (H) 0.00 - 4.50 ng/mL Final   2023 5.30 (H) 0.00 - 4.50 ng/mL Final       Bowel: Normal    Bladder: negative  Nocturia: 0-1 x per night    Energy Level: normal    Appointments:   Urologist: Dr. Almendarez Has not seen since starting RT

## 2024-01-16 ENCOUNTER — OFFICE VISIT (OUTPATIENT)
Dept: FAMILY MEDICINE | Facility: CLINIC | Age: 69
End: 2024-01-16
Payer: COMMERCIAL

## 2024-01-16 VITALS
BODY MASS INDEX: 26.07 KG/M2 | HEIGHT: 68 IN | DIASTOLIC BLOOD PRESSURE: 76 MMHG | OXYGEN SATURATION: 97 % | RESPIRATION RATE: 18 BRPM | WEIGHT: 172 LBS | TEMPERATURE: 97.9 F | SYSTOLIC BLOOD PRESSURE: 136 MMHG | HEART RATE: 76 BPM

## 2024-01-16 DIAGNOSIS — K40.90 RIGHT INGUINAL HERNIA: Primary | ICD-10-CM

## 2024-01-16 DIAGNOSIS — C61 PROSTATE CANCER (H): ICD-10-CM

## 2024-01-16 PROCEDURE — 99213 OFFICE O/P EST LOW 20 MIN: CPT | Performed by: FAMILY MEDICINE

## 2024-01-16 RX ORDER — RESPIRATORY SYNCYTIAL VIRUS VACCINE 120MCG/0.5
0.5 KIT INTRAMUSCULAR ONCE
Qty: 1 EACH | Refills: 0 | Status: CANCELLED | OUTPATIENT
Start: 2024-01-16 | End: 2024-01-16

## 2024-01-16 ASSESSMENT — ENCOUNTER SYMPTOMS
FATIGUE: 0
NAUSEA: 0
UNEXPECTED WEIGHT CHANGE: 0
FLANK PAIN: 0
ABDOMINAL PAIN: 0
ACTIVITY CHANGE: 1
FEVER: 0
APPETITE CHANGE: 0
VOMITING: 0
DYSURIA: 0

## 2024-01-16 ASSESSMENT — PAIN SCALES - GENERAL: PAINLEVEL: MILD PAIN (2)

## 2024-01-16 NOTE — PROGRESS NOTES
"  Assessment & Plan     Dillon was seen today for consult.    Diagnoses and all orders for this visit:    Right inguinal hernia  -     Adult General Surg Referral; Future    Obvious right groin bulge, reducible - herniates again shortly after. No signs of incarceration. It is causing him discomfort, affecting his daily routine. He is an active person. There is MRI from 9/2023 - no read, symptoms weren't as significant then. Referral to general surgery placed.    Prostate cancer (H)  S/p radiation. Well appearing. Follows with onc.    Other orders  -     REVIEW OF HEALTH MAINTENANCE PROTOCOL ORDERS           20 minutes spent by me on the date of the encounter doing chart review, history and exam, documentation and further activities per the note       BMI:   Estimated body mass index is 26.15 kg/m  as calculated from the following:    Height as of this encounter: 1.727 m (5' 8\").    Weight as of this encounter: 78 kg (172 lb).           Arely Pollock MD  [facility]    Subjective   Dillon is a 68 year old, presenting for the following health issues:  Consult      History of Present Illness       Reason for visit:  Hernia symptoms  Symptom onset:  More than a month  Symptoms include:  Bulge and tenderness in the right pubic area  Symptom intensity:  Mild  Symptom progression:  Worsening  Had these symptoms before:  Yes  What makes it worse:  Heavier exercise  What makes it better:  Reduced exercise    He eats 2-3 servings of fruits and vegetables daily.He consumes 1 sweetened beverage(s) daily.He exercises with enough effort to increase his heart rate 20 to 29 minutes per day.  He exercises with enough effort to increase his heart rate 4 days per week.   He is taking medications regularly.    In August was planting a tree had some pain in right groin thought he pulled a muscle - pain improves  Now he feels it daily  Feels a bulge and is able to push it back in  Doesn't hurt to touch " "it  Exercises in the morning - causes discomfort                Review of Systems   Constitutional:  Positive for activity change. Negative for appetite change, fatigue, fever and unexpected weight change.   Gastrointestinal:  Negative for abdominal pain, nausea and vomiting.   Genitourinary:  Negative for dysuria, flank pain, penile pain, scrotal swelling and testicular pain.            Objective    /76   Pulse 76   Temp 97.9  F (36.6  C) (Tympanic)   Resp 18   Ht 1.727 m (5' 8\")   Wt 78 kg (172 lb)   SpO2 97%   BMI 26.15 kg/m    Body mass index is 26.15 kg/m .  Physical Exam  Vitals reviewed.   Cardiovascular:      Rate and Rhythm: Normal rate.   Pulmonary:      Effort: Pulmonary effort is normal.   Abdominal:      Palpations: There is mass (right groin, reducible, non tender).   Neurological:      Mental Status: He is alert.                              "

## 2024-01-22 ASSESSMENT — ACTIVITIES OF DAILY LIVING (ADL): CURRENT_FUNCTION: NO ASSISTANCE NEEDED

## 2024-01-22 ASSESSMENT — ENCOUNTER SYMPTOMS
COUGH: 0
CONSTIPATION: 0
PARESTHESIAS: 0
DIZZINESS: 0
CHILLS: 0
SHORTNESS OF BREATH: 0
HEMATOCHEZIA: 0
HEMATURIA: 0
ABDOMINAL PAIN: 1
DIARRHEA: 0
ARTHRALGIAS: 0
HEADACHES: 0
JOINT SWELLING: 0
WEAKNESS: 0
EYE PAIN: 0
MYALGIAS: 0
HEARTBURN: 0
NERVOUS/ANXIOUS: 0
DYSURIA: 0
FEVER: 0
FREQUENCY: 0
SORE THROAT: 0
PALPITATIONS: 0
NAUSEA: 0

## 2024-01-26 ENCOUNTER — OFFICE VISIT (OUTPATIENT)
Dept: SURGERY | Facility: CLINIC | Age: 69
End: 2024-01-26
Attending: FAMILY MEDICINE
Payer: COMMERCIAL

## 2024-01-26 VITALS
BODY MASS INDEX: 26.07 KG/M2 | WEIGHT: 172 LBS | DIASTOLIC BLOOD PRESSURE: 87 MMHG | HEIGHT: 68 IN | HEART RATE: 68 BPM | OXYGEN SATURATION: 99 % | SYSTOLIC BLOOD PRESSURE: 160 MMHG | TEMPERATURE: 97.6 F

## 2024-01-26 DIAGNOSIS — K40.90 RIGHT INGUINAL HERNIA: Primary | ICD-10-CM

## 2024-01-26 DIAGNOSIS — C61 PROSTATE CANCER (H): ICD-10-CM

## 2024-01-26 PROCEDURE — 99204 OFFICE O/P NEW MOD 45 MIN: CPT | Performed by: SURGERY

## 2024-01-26 ASSESSMENT — PAIN SCALES - GENERAL: PAINLEVEL: NO PAIN (0)

## 2024-01-26 NOTE — PROGRESS NOTES
"  Assessment & Plan   Problem List Items Addressed This Visit          Urinary    Prostate cancer (H)     Other Visit Diagnoses       Right inguinal hernia    -  Primary    BMI 26.0-26.9,adult               68 yo with right inguinal hernia  -Pt is s/p radiation 2/2 to prostate cancer.    -I discussed with the patient the options of surgical repair.  We can do this via an open incision versus a robotic technique.  Either way due to his recent radiation that was done in October, I generally recommend at least 6 months before doing additional surgeries.  -I will need to confirm this with his radiation oncologist before deciding when it is safe to take him back for surgery.  -We discussed reasoning for this including scarring, healing, increased risk of mesh infection, or completion of his prostate cancer therapy.  -I recommend that he sees me back in about 3 months, we will discuss the risks, benefits, alternatives of the procedure.  At that time I will know when it is safe for me to take him for his surgery from a radiation oncology standpoint.   -In the meantime, I recommend that patient refrain from heavy lifting.  He may walk daily.  Wearing a hernia belt to cover that bulge also would be more comfortable for him.  -All of his questions were answered.     Face to Face/patient Contact total time: 20 minutes  Pre Charting time: 5 minutes; Post charting time, communication and other activities: 15 minutes;   Total time:  40 minutes       BMI  Estimated body mass index is 26.15 kg/m  as calculated from the following:    Height as of this encounter: 1.727 m (5' 8\").    Weight as of this encounter: 78 kg (172 lb).       No follow-ups on file.      Zaria Carranza is a 69 year old, presenting for the following health issues:  Consult and Hernia    Right groin bulge  Noted 4 months ago and noted a \"pop\"  Hx of prostate cancer - radiation done in Oct 2023.   Felt much better then started working out again and noted a " "bulge.   Just had radiation but no surgery.  Radiation went very well; but no issues otherwise.   Feels very tight in the right groin after a 4 mile walk  There is a bulge; able to reduce this  No issues with urination  Never had abdominal surgery.   Not on blood thinner; never CVA.    Quit smoker 2015; nicotine free             Review of Systems  Constitutional, HEENT, cardiovascular, pulmonary, GI, , musculoskeletal, neuro, skin, endocrine and psych systems are negative, except as otherwise noted.      Objective    BP (!) 160/87   Pulse 68   Temp 97.6  F (36.4  C) (Tympanic)   Ht 1.727 m (5' 8\")   Wt 78 kg (172 lb)   SpO2 99%   BMI 26.15 kg/m    Body mass index is 26.15 kg/m .  Physical Exam  Vitals reviewed.   Eyes:      Conjunctiva/sclera: Conjunctivae normal.   Pulmonary:      Effort: Pulmonary effort is normal.   Abdominal:      Palpations: Abdomen is soft.       Musculoskeletal:         General: Normal range of motion.   Skin:     General: Skin is warm.      Capillary Refill: Capillary refill takes less than 2 seconds.   Neurological:      General: No focal deficit present.      Mental Status: He is alert.   Psychiatric:         Mood and Affect: Mood normal.                Signed Electronically by: Bryan Alex MD    "

## 2024-01-26 NOTE — LETTER
"    1/26/2024         RE: Dillon Matias  [id number] [facility] [id number]        Dear Colleague,    Thank you for referring your patient, Dillon Matias, to the [facility]. Please see a copy of my visit note below.      Assessment & Plan  Problem List Items Addressed This Visit          Urinary    Prostate cancer (H)     Other Visit Diagnoses       Right inguinal hernia    -  Primary    BMI 26.0-26.9,adult               70 yo with right inguinal hernia  -Pt is s/p radiation 2/2 to prostate cancer.    -I discussed with the patient the options of surgical repair.  We can do this via an open incision versus a robotic technique.  Either way due to his recent radiation that was done in October, I generally recommend at least 6 months before doing additional surgeries.  -I will need to confirm this with his radiation oncologist before deciding when it is safe to take him back for surgery.  -We discussed reasoning for this including scarring, healing, increased risk of mesh infection, or completion of his prostate cancer therapy.  -I recommend that he sees me back in about 3 months, we will discuss the risks, benefits, alternatives of the procedure.  At that time I will know when it is safe for me to take him for his surgery from a radiation oncology standpoint.   -In the meantime, I recommend that patient refrain from heavy lifting.  He may walk daily.  Wearing a hernia belt to cover that bulge also would be more comfortable for him.  -All of his questions were answered.     Face to Face/patient Contact total time: 20 minutes  Pre Charting time: 5 minutes; Post charting time, communication and other activities: 15 minutes;   Total time:  40 minutes       BMI  Estimated body mass index is 26.15 kg/m  as calculated from the following:    Height as of this encounter: 1.727 m (5' 8\").    Weight as of this encounter: 78 kg (172 lb).       No follow-ups on file.      Subjective  Dillon is a " "69 year old, presenting for the following health issues:  Consult and Hernia    Right groin bulge  Noted 4 months ago and noted a \"pop\"  Hx of prostate cancer - radiation done in Oct 2023.   Felt much better then started working out again and noted a bulge.   Just had radiation but no surgery.  Radiation went very well; but no issues otherwise.   Feels very tight in the right groin after a 4 mile walk  There is a bulge; able to reduce this  No issues with urination  Never had abdominal surgery.   Not on blood thinner; never CVA.    Quit smoker 2015; nicotine free             Review of Systems  Constitutional, HEENT, cardiovascular, pulmonary, GI, , musculoskeletal, neuro, skin, endocrine and psych systems are negative, except as otherwise noted.      Objective   BP (!) 160/87   Pulse 68   Temp 97.6  F (36.4  C) (Tympanic)   Ht 1.727 m (5' 8\")   Wt 78 kg (172 lb)   SpO2 99%   BMI 26.15 kg/m    Body mass index is 26.15 kg/m .  Physical Exam  Vitals reviewed.   Eyes:      Conjunctiva/sclera: Conjunctivae normal.   Pulmonary:      Effort: Pulmonary effort is normal.   Abdominal:      Palpations: Abdomen is soft.       Musculoskeletal:         General: Normal range of motion.   Skin:     General: Skin is warm.      Capillary Refill: Capillary refill takes less than 2 seconds.   Neurological:      General: No focal deficit present.      Mental Status: He is alert.   Psychiatric:         Mood and Affect: Mood normal.                Signed Electronically by: Bryan Alex MD      Again, thank you for allowing me to participate in the care of your patient.        Sincerely,        Bryan Alex MD  "

## 2024-01-29 ENCOUNTER — OFFICE VISIT (OUTPATIENT)
Dept: FAMILY MEDICINE | Facility: CLINIC | Age: 69
End: 2024-01-29
Payer: COMMERCIAL

## 2024-01-29 VITALS
SYSTOLIC BLOOD PRESSURE: 124 MMHG | RESPIRATION RATE: 22 BRPM | WEIGHT: 171 LBS | HEIGHT: 68 IN | HEART RATE: 69 BPM | TEMPERATURE: 97 F | BODY MASS INDEX: 25.91 KG/M2 | OXYGEN SATURATION: 97 % | DIASTOLIC BLOOD PRESSURE: 82 MMHG

## 2024-01-29 DIAGNOSIS — Z29.11 NEED FOR VACCINATION AGAINST RESPIRATORY SYNCYTIAL VIRUS: ICD-10-CM

## 2024-01-29 DIAGNOSIS — I77.819 AORTIC ECTASIA (H): ICD-10-CM

## 2024-01-29 DIAGNOSIS — Z23 NEED FOR TDAP VACCINATION: ICD-10-CM

## 2024-01-29 DIAGNOSIS — E78.5 DYSLIPIDEMIA: ICD-10-CM

## 2024-01-29 DIAGNOSIS — I25.10 CORONARY ARTERY DISEASE INVOLVING NATIVE CORONARY ARTERY OF NATIVE HEART WITHOUT ANGINA PECTORIS: Primary | ICD-10-CM

## 2024-01-29 DIAGNOSIS — Z87.891 PERSONAL HISTORY OF TOBACCO USE: ICD-10-CM

## 2024-01-29 DIAGNOSIS — Z00.00 ENCOUNTER FOR MEDICARE ANNUAL WELLNESS EXAM: ICD-10-CM

## 2024-01-29 LAB
ALBUMIN SERPL BCG-MCNC: 4.3 G/DL (ref 3.5–5.2)
ALP SERPL-CCNC: 140 U/L (ref 40–150)
ALT SERPL W P-5'-P-CCNC: 27 U/L (ref 0–70)
ANION GAP SERPL CALCULATED.3IONS-SCNC: 11 MMOL/L (ref 7–15)
AST SERPL W P-5'-P-CCNC: 26 U/L (ref 0–45)
BILIRUB SERPL-MCNC: 0.7 MG/DL
BUN SERPL-MCNC: 13.4 MG/DL (ref 8–23)
CALCIUM SERPL-MCNC: 9.3 MG/DL (ref 8.8–10.2)
CHLORIDE SERPL-SCNC: 104 MMOL/L (ref 98–107)
CHOLEST SERPL-MCNC: 125 MG/DL
CREAT SERPL-MCNC: 0.93 MG/DL (ref 0.67–1.17)
DEPRECATED HCO3 PLAS-SCNC: 25 MMOL/L (ref 22–29)
EGFRCR SERPLBLD CKD-EPI 2021: 88 ML/MIN/1.73M2
FASTING STATUS PATIENT QL REPORTED: YES
GLUCOSE SERPL-MCNC: 100 MG/DL (ref 70–99)
HDLC SERPL-MCNC: 43 MG/DL
LDLC SERPL CALC-MCNC: 65 MG/DL
NONHDLC SERPL-MCNC: 82 MG/DL
POTASSIUM SERPL-SCNC: 3.9 MMOL/L (ref 3.4–5.3)
PROT SERPL-MCNC: 6.9 G/DL (ref 6.4–8.3)
SODIUM SERPL-SCNC: 140 MMOL/L (ref 135–145)
TRIGL SERPL-MCNC: 87 MG/DL

## 2024-01-29 PROCEDURE — 99213 OFFICE O/P EST LOW 20 MIN: CPT | Mod: 25 | Performed by: PHYSICIAN ASSISTANT

## 2024-01-29 PROCEDURE — 99397 PER PM REEVAL EST PAT 65+ YR: CPT | Performed by: PHYSICIAN ASSISTANT

## 2024-01-29 PROCEDURE — 80053 COMPREHEN METABOLIC PANEL: CPT | Performed by: PHYSICIAN ASSISTANT

## 2024-01-29 PROCEDURE — G0296 VISIT TO DETERM LDCT ELIG: HCPCS | Performed by: PHYSICIAN ASSISTANT

## 2024-01-29 PROCEDURE — 80061 LIPID PANEL: CPT | Performed by: PHYSICIAN ASSISTANT

## 2024-01-29 PROCEDURE — 36415 COLL VENOUS BLD VENIPUNCTURE: CPT | Performed by: PHYSICIAN ASSISTANT

## 2024-01-29 RX ORDER — RESPIRATORY SYNCYTIAL VIRUS VACCINE 120MCG/0.5
0.5 KIT INTRAMUSCULAR ONCE
Qty: 1 EACH | Refills: 0 | Status: SHIPPED | OUTPATIENT
Start: 2024-01-29 | End: 2024-01-29

## 2024-01-29 RX ORDER — ATORVASTATIN CALCIUM 80 MG/1
80 TABLET, FILM COATED ORAL EVERY EVENING
Qty: 90 TABLET | Refills: 3 | Status: SHIPPED | OUTPATIENT
Start: 2024-01-29

## 2024-01-29 ASSESSMENT — ENCOUNTER SYMPTOMS
DIZZINESS: 0
HEMATURIA: 0
SHORTNESS OF BREATH: 0
NERVOUS/ANXIOUS: 0
JOINT SWELLING: 0
DYSURIA: 0
FREQUENCY: 0
ARTHRALGIAS: 0
ABDOMINAL PAIN: 1
CHILLS: 0
NAUSEA: 0
SORE THROAT: 0
FEVER: 0
MYALGIAS: 0
COUGH: 0
HEADACHES: 0
EYE PAIN: 0
WEAKNESS: 0
DIARRHEA: 0
CONSTIPATION: 0
PALPITATIONS: 0

## 2024-01-29 ASSESSMENT — ACTIVITIES OF DAILY LIVING (ADL): CURRENT_FUNCTION: NO ASSISTANCE NEEDED

## 2024-01-29 ASSESSMENT — PAIN SCALES - GENERAL: PAINLEVEL: NO PAIN (1)

## 2024-01-29 NOTE — PROGRESS NOTES
The patient was provided with suggestions to help him develop a healthy physical lifestyle.  The patient was counseled and encouraged to consider modifying their diet and eating habits. He was provided with information on recommended healthy diet options.  Lung Cancer Screening Shared Decision Making Visit     Dillon Matias, a 69 year old male, is eligible for lung cancer screening    History   Smoking Status     Former     Packs/day: 1.00     Years: 45.00     Types: Cigarettes     Start date: 6/1/1970     Quit date: 7/15/2015   Smokeless Tobacco     Never       I have discussed with patient the risks and benefits of screening for lung cancer with low-dose CT.     The risks include:    radiation exposure: one low dose chest CT has as much ionizing radiation as about 15 chest x-rays, or 6 months of background radiation living in [place]      false positives: most findings/nodules are NOT cancer, but some might still require additional diagnostic evaluation, including biopsy    over-diagnosis: some slow growing cancers that might never have been clinically significant will be detected and treated unnecessarily     The benefit of early detection of lung cancer is contingent upon adherence to annual screening or more frequent follow up if indicated.     Furthermore, to benefit from screening, Dillon must be willing and able to undergo diagnostic procedures, if indicated. Although no specific guide is available for determining severity of comorbidities, it is reasonable to withhold screening in patients who have greater mortality risk from other diseases.     We did discuss that the best way to prevent lung cancer is to not smoke.    Some patients may value a numeric estimation of lung cancer risk when evaluating if lung cancer screening is right for them, here is one calculator:    ShouldIScreen

## 2024-01-29 NOTE — PROGRESS NOTES
"Preventive Care Visit  [facility]  Antonio Hernandez PA-C, Family Medicine  Jan 29, 2024      SUBJECTIVE:   Dillon is a 69 year old, presenting for the following:  Medicare Visit        1/29/2024     9:41 AM   Additional Questions   Roomed by Lola WHITNEY     Are you in the first 12 months of your Medicare coverage?  No    Healthy Habits:     In general, how would you rate your overall health?  Fair    Frequency of exercise:  4-5 days/week    Duration of exercise:  15-30 minutes    Do you usually eat at least 4 servings of fruit and vegetables a day, include whole grains    & fiber and avoid regularly eating high fat or \"junk\" foods?  No    Taking medications regularly:  Yes    Medication side effects:  Not applicable    Ability to successfully perform activities of daily living:  No assistance needed    Home Safety:  No safety concerns identified    Hearing Impairment:  No hearing concerns    In the past 6 months, have you been bothered by leaking of urine?  No    In general, how would you rate your overall mental or emotional health?  Excellent    Additional concerns today:  No    Today's PHQ-2 Score:       1/29/2024     9:37 AM   PHQ-2 ( 1999 [employer])   Q1: Little interest or pleasure in doing things 0   Q2: Feeling down, depressed or hopeless 0   PHQ-2 Score 0   Q1: Little interest or pleasure in doing things Not at all   Q2: Feeling down, depressed or hopeless Not at all   PHQ-2 Score 0     Have you ever done Advance Care Planning? (For example, a Health Directive, POLST, or a discussion with a medical provider or your loved ones about your wishes): Yes, advance care planning is on file.    Fall risk  Fallen 2 or more times in the past year?: No  Any fall with injury in the past year?: No    Cognitive Screening   1) Repeat 3 items (Leader, Season, Table)    2) Clock draw: NORMAL  3) 3 item recall: Recalls 2 objects   Results: NORMAL clock, 1-2 items recalled: COGNITIVE IMPAIRMENT LESS " LIKELY    Mini-CogTM Copyright SRIDHAR Mckenzie. Licensed by the author for use in [facility]; reprinted with permission (ean@[facility]). All rights reserved.      Do you have sleep apnea, excessive snoring or daytime drowsiness? : yes, excessive snoring and sleepy after dinner     Reviewed and updated as needed this visit by clinical staff   Tobacco  Allergies  Meds  Problems  Med Hx  Surg Hx  Fam Hx          Reviewed and updated as needed this visit by Provider   Tobacco  Allergies  Meds  Problems  Med Hx  Surg Hx  Fam Hx          Social History     Tobacco Use    Smoking status: Former     Packs/day: 1.00     Years: 45.00     Additional pack years: 0.00     Total pack years: 45.00     Types: Cigarettes     Start date: 1970     Quit date: 7/15/2015     Years since quittin.5     Passive exposure: Past    Smokeless tobacco: Never   Substance Use Topics    Alcohol use: Not Currently             2024    11:19 AM   Alcohol Use   Prescreen: >3 drinks/day or >7 drinks/week? Not Applicable     Do you have a current opioid prescription? No  Do you use any other controlled substances or medications that are not prescribed by a provider? None    Current providers sharing in care for this patient include:   Patient Care Team:  Antonio Hernandez PA-C as PCP - General (Physician Assistant)  Antonio Hernandez PA-C as Assigned PCP  Tania Garrett PA-C as Physician Assistant (Urology)  Agustina Nunez MD as MD (Cardiovascular Disease)  Agustina Nunez MD as Assigned Heart and Vascular Provider  Jaren Ohara MD as Assigned Cancer Care Provider  Eduarda Gaytan PA-C as Assigned Surgical Provider  Conchis Luciano APRN CNP as Assigned Behavioral Health Provider    The following health maintenance items are reviewed in Epic and correct as of today:  Health Maintenance   Topic Date Due    RSV VACCINE (Pregnancy & 60+) (1 - 1-dose 60+ series) Never done    DTAP/TDAP/TD IMMUNIZATION  "(4 - Td or Tdap) 03/18/2023    LUNG CANCER SCREENING  02/19/2024    ANNUAL REVIEW OF HM ORDERS  01/16/2025    MEDICARE ANNUAL WELLNESS VISIT  01/29/2025    FALL RISK ASSESSMENT  01/29/2025    LIPID  01/20/2028    COLORECTAL CANCER SCREENING  03/22/2028    ADVANCE CARE PLANNING  01/29/2029    HEPATITIS C SCREENING  Completed    PHQ-2 (once per calendar year)  Completed    INFLUENZA VACCINE  Completed    Pneumococcal Vaccine: 65+ Years  Completed    ZOSTER IMMUNIZATION  Completed    AORTIC ANEURYSM SCREENING (SYSTEM ASSIGNED)  Completed    COVID-19 Vaccine  Completed    IPV IMMUNIZATION  Aged Out    HPV IMMUNIZATION  Aged Out    MENINGITIS IMMUNIZATION  Aged Out    RSV MONOCLONAL ANTIBODY  Aged Out     Lab work is in process  Labs reviewed in EPIC    Review of Systems   Constitutional:  Negative for chills and fever.   HENT:  Negative for congestion, ear pain, hearing loss and sore throat.    Eyes:  Negative for pain and visual disturbance.   Respiratory:  Negative for cough and shortness of breath.    Cardiovascular:  Negative for chest pain and palpitations.   Gastrointestinal:  Positive for abdominal pain. Negative for constipation, diarrhea and nausea.   Genitourinary:  Negative for dysuria, frequency, genital sores, hematuria, penile discharge and urgency.   Musculoskeletal:  Negative for arthralgias, joint swelling and myalgias.   Skin:  Negative for rash.   Neurological:  Negative for dizziness, weakness and headaches.   Psychiatric/Behavioral:  The patient is not nervous/anxious.      Review of Systems  See HPI     OBJECTIVE:   /82   Pulse 69   Temp 97  F (36.1  C) (Tympanic)   Resp 22   Ht 1.727 m (5' 8\")   Wt 77.6 kg (171 lb)   SpO2 97%   BMI 26.00 kg/m     Estimated body mass index is 26 kg/m  as calculated from the following:    Height as of this encounter: 1.727 m (5' 8\").    Weight as of this encounter: 77.6 kg (171 lb).  Physical Exam  GENERAL: alert and no distress  NECK: no adenopathy, no " "asymmetry, masses, or scars  RESP: lungs clear to auscultation - no rales, rhonchi or wheezes  CV: regular rate and rhythm, normal S1 S2, no S3 or S4, no murmur, click or rub, no peripheral edema  ABDOMEN: soft, nontender, no hepatosplenomegaly, no masses and bowel sounds normal  MS: no gross musculoskeletal defects noted, no edema    Diagnostic Test Results:  Labs reviewed in Epic    ASSESSMENT / PLAN:   Coronary artery disease involving native coronary artery of native heart without angina pectoris  Dyslipidemia  Asymptomatic from a cardiac perspective. Follows with Cardiology. Continue ASA. Refilled Lipitor.   - atorvastatin (LIPITOR) 80 MG tablet; Take 1 tablet (80 mg) by mouth every evening  - Lipid panel reflex to direct LDL Fasting; Future    Aortic ectasia (H24)  Due for recheck. US ordered.   - US Abdominal Aorta Imaging; Future    Encounter for Medicare annual wellness exam  69 yr old here for Medicare Wellness exam. Last MWE done 1 year(s) ago. Discussed preventative screenings which are updated below. Counseled on immunizations and healthy lifestyle. Follow-up in 1 year for repeat physical exam.     Need for vaccination against respiratory syncytial virus  - respiratory syncytial virus vaccine, bivalent (ABRYSVO) injection; Inject 0.5 mLs into the muscle once for 1 dose    Need for Tdap vaccination  - Tdap, tetanus-diptheria-acell pertussis, (BOOSTRIX) 5-2.5-18.5 LF-MCG/0.5 JUAN F injection; Inject 0.5 mLs into the muscle once for 1 dose    Personal history of tobacco use  Due for recheck.   - Prof fee: Shared Decision Making for Lung Cancer Screening  - CT Chest Lung Cancer Scrn Low Dose wo; Future    Patient has been advised of split billing requirements and indicates understanding: Yes    Counseling  Reviewed preventive health counseling, as reflected in patient instructions    BMI  Estimated body mass index is 26 kg/m  as calculated from the following:    Height as of this encounter: 1.727 m (5' 8\").   "  Weight as of this encounter: 77.6 kg (171 lb).     He reports that he quit smoking about 8 years ago. His smoking use included cigarettes. He started smoking about 53 years ago. He has a 45 pack-year smoking history. He has been exposed to tobacco smoke. He has never used smokeless tobacco.    Appropriate preventive services were discussed with this patient, including applicable screening as appropriate for fall prevention, nutrition, physical activity, Tobacco-use cessation, weight loss and cognition.  Checklist reviewing preventive services available has been given to the patient.    Reviewed patients plan of care and provided an AVS. The Intermediate Care Plan ( asthma action plan, low back pain action plan, and migraine action plan) for Dillon meets the Care Plan requirement. This Care Plan has been established and reviewed with the Patient.    Signed Electronically by: Antonio Hernandez PA-C    Identified Health Risks  I have reviewed Opioid Use Disorder and Substance Use Disorder risk factors and made any needed referrals.

## 2024-01-29 NOTE — PATIENT INSTRUCTIONS
Patient Education   Personalized Prevention Plan  You are due for the preventive services outlined below.  Your care team is available to assist you in scheduling these services.  If you have already completed any of these items, please share that information with your care team to update in your medical record.  Health Maintenance Due   Topic Date Due     RSV VACCINE (Pregnancy & 60+) (1 - 1-dose 60+ series) Never done     Diptheria Tetanus Pertussis (DTAP/TDAP/TD) Vaccine (4 - Td or Tdap) 03/18/2023     LUNG CANCER SCREENING  02/19/2024     Your Health Risk Assessment indicates you feel you are not in good health    A healthy lifestyle helps keep the body fit and the mind alert. It helps protect you from disease, helps you fight disease, and helps prevent chronic disease (disease that doesn't go away) from getting worse. This is important as you get older and begin to notice twinges in muscles and joints and a decline in the strength and stamina you once took for granted. A healthy lifestyle includes good healthcare, good nutrition, weight control, recreation, and regular exercise. Avoid harmful substances and do what you can to keep safe. Another part of a healthy lifestyle is stay mentally active and socially involved.    Good healthcare   Have a wellness visit every year.   If you have new symptoms, let us know right away. Don't wait until the next checkup.   Take medicines exactly as prescribed and keep your medicines in a safe place. Tell us if your medicine causes problems.   Healthy diet and weight control   Eat 3 or 4 small, nutritious, low-fat, high-fiber meals a day. Include a variety of fruits, vegetables, and whole-grain foods.   Make sure you get enough calcium in your diet. Calcium, vitamin D, and exercise help prevent osteoporosis (bone thinning).   If you live alone, try eating with others when you can. That way you get a good meal and have company while you eat it.   Try to keep a healthy weight.  If you eat more calories than your body uses for energy, it will be stored as fat and you will gain weight.     Recreation   Recreation is not limited to sports and team events. It includes any activity that provides relaxation, interest, enjoyment, and exercise. Recreation provides an outlet for physical, mental, and social energy. It can give a sense of worth and achievement. It can help you stay healthy.    Mental Exercise and Social Involvement  Mental and emotional health is as important as physical health. Keep in touch with friends and family. Stay as active as possible. Continue to learn and challenge yourself.   Things you can do to stay mentally active are:  Learn something new, like a foreign language or musical instrument.   Play SCRABBLE or do crossword puzzles. If you cannot find people to play these games with you at home, you can play them with others on your computer through the Internet.   Join a games club--anything from card games to chess or checkers or lawn bowling.   Start a new hobby.   Go back to school.   Volunteer.   Read.   Keep up with world events.  Learning About Dietary Guidelines  What are the Dietary Guidelines for Americans?     Dietary Guidelines for Americans provide tips for eating well and staying healthy. This helps reduce the risk for long-term (chronic) diseases.  These guidelines recommend that you:  Eat and drink the right amount for you. The U.[place] government's food guide is called MyPlate. It can help you make your own well-balanced eating plan.  Try to balance your eating with your activity. This helps you stay at a healthy weight.  Drink alcohol in moderation, if at all.  Limit foods high in salt, saturated fat, trans fat, and added sugar.  These guidelines are from the U.S. Department of Agriculture and the U.S. Department of Health and Human Services. They are updated every 5 years.  What is MyPlate?  MyPlate is the U.[place] government's food guide. It can help you make  "your own well-balanced eating plan. A balanced eating plan means that you eat enough, but not too much, and that your food gives you the nutrients you need to stay healthy.  MyPlate focuses on eating plenty of whole grains, fruits, and vegetables, and on limiting fat and sugar. It is available online at www.ChooseMyPlate.gov.  How can you get started?  If you're trying to eat healthier, you can slowly change your eating habits over time. You don't have to make big changes all at once. Start by adding one or two healthy foods to your meals each day.  Grains  Choose whole-grain breads and cereals and whole-wheat pasta and whole-grain crackers.  Vegetables  Eat a variety of vegetables every day. They have lots of nutrients and are part of a heart-healthy diet.  Fruits  Eat a variety of fruits every day. Fruits contain lots of nutrients. Choose fresh fruit instead of fruit juice.  Protein foods  Choose fish and lean poultry more often. Eat red meat and fried meats less often. Dried beans, tofu, and nuts are also good sources of protein.  Dairy  Choose low-fat or fat-free products from this food group. If you have problems digesting milk, try eating cheese or yogurt instead.  Fats and oils  Limit fats and oils if you're trying to cut calories. Choose healthy fats when you cook. These include canola oil and olive oil.  Where can you learn more?  Go to https://www.[employer].net/patiented  Enter D676 in the search box to learn more about \"Learning About Dietary Guidelines.\"  Current as of: February 28, 2023               Content Version: 13.8    8357-5948 [employer].   Care instructions adapted under license by your healthcare professional. If you have questions about a medical condition or this instruction, always ask your healthcare professional. [employer] disclaims any warranty or liability for your use of this information.         Lung Cancer Screening   Frequently Asked Questions  If you " are at high-risk for lung cancer, getting screened with low-dose computed tomography (LDCT) every year can help save your life. This handout offers answers to some of the most common questions about lung cancer screening. If you have other questions, please call [phone] ([phone]).     What is it?  Lung cancer screening uses special X-ray technology to create an image of your lung tissue. The exam is quick and easy and takes less than 10 seconds. We don t give you any medicine or use any needles. You can eat before and after the exam. You don t need to change your clothes as long as the clothing on your chest doesn t contain metal. But, you do need to be able to hold your breath for at least 6 seconds during the exam.    What is the goal of lung cancer screening?  The goal of lung cancer screening is to save lives. Many times, lung cancer is not found until a person starts having physical symptoms. Lung cancer screening can help detect lung cancer in the earliest stages when it may be easier to treat.    Who should be screened for lung cancer?  We suggest lung cancer screening for anyone who is at high-risk for lung cancer. You are in the high-risk group if you:      are between the ages of 55 and 79, and    have smoked at least 1 pack of cigarettes a day for 20 or more years, and    still smoke or have quit within the past 15 years.    However, if you have a new cough or shortness of breath, you should talk to your doctor before being screened.    Why does it matter if I have symptoms?  Certain symptoms can be a sign that you have a condition in your lungs that should be checked and treated by your doctor. These symptoms include fever, chest pain, a new or changing cough, shortness of breath that you have never felt before, coughing up blood or unexplained weight loss. Having any of these symptoms can greatly affect the results of lung cancer screening.       Should all smokers get an LDCT lung  cancer screening exam?  It depends. Lung cancer screening is for a very specific group of men and women who have a history of heavy smoking over a long period of time (see  Who should be screened for lung cancer  above).  I am in the high-risk group, but have been diagnosed with cancer in the past. Is LDCT lung cancer screening right for me?  In some cases, you should not have LDCT lung screening, such as when your doctor is already following your cancer with CT scan studies. Your doctor will help you decide if LDCT lung screening is right for you.  Do I need to have a screening exam every year?  Yes. If you are in the high-risk group described earlier, you should get an LDCT lung cancer screening exam every year until you are 79, or are no longer willing or able to undergo screening and possible procedures to diagnose and treat lung cancer.  How effective is LDCT at preventing death from lung cancer?  Studies have shown that LDCT lung cancer screening can lower the risk of death from lung cancer by 20 percent in people who are at high-risk.  What are the risks?  There are some risks and limitations of LDCT lung cancer screening. We want to make sure you understand the risks and benefits, so please let us know if you have any questions. Your doctor may want to talk with you more about these risks.    Radiation exposure: As with any exam that uses radiation, there is a very small increased risk of cancer. The amount of radiation in LDCT is small--about the same amount a person would get from a mammogram. Your doctor orders the exam when he or she feels the potential benefits outweigh the risks.    False negatives: No test is perfect, including LDCT. It is possible that you may have a medical condition, including lung cancer, that is not found during your exam. This is called a false negative result.    False positives and more testing: LDCT very often finds something in the lung that could be cancer, but in fact is  not. This is called a false positive result. False positive tests often cause anxiety. To make sure these findings are not cancer, you may need to have more tests. These tests will be done only if you give us permission. Sometimes patients need a treatment that can have side effects, such as a biopsy. For more information on false positives, see  What can I expect from the results?     Findings not related to lung cancer: Your LDCT exam also takes pictures of areas of your body next to your lungs. In a very small number of cases, the CT scan will show an abnormal finding in one of these areas, such as your kidneys, adrenal glands, liver or thyroid. This finding may not be serious, but you may need more tests. Your doctor can help you decide what other tests you may need, if any.  What can I expect from the results?  About 1 out of 4 LDCT exams will find something that may need more tests. Most of the time, these findings are lung nodules. Lung nodules are very small collections of tissue in the lung. These nodules are very common, and the vast majority--more than 97 percent--are not cancer (benign). Most are normal lymph nodes or small areas of scarring from past infections.  But, if a small lung nodule is found to be cancer, the cancer can be cured more than 90 percent of the time. To know if the nodule is cancer, we may need to get more images before your next yearly screening exam. If the nodule has suspicious features (for example, it is large, has an odd shape or grows over time), we will refer you to a specialist for further testing.  Will my doctor also get the results?  Yes. Your doctor will get a copy of your results.  Is it okay to keep smoking now that there s a cancer screening exam?  No. Tobacco is one of the strongest cancer-causing agents. It causes not only lung cancer, but other cancers and cardiovascular (heart) diseases as well. The damage caused by smoking builds over time. This means that the  longer you smoke, the higher your risk of disease. While it is never too late to quit, the sooner you quit, the better.  Where can I find help to quit smoking?  The best way to prevent lung cancer is to stop smoking. If you have already quit smoking, congratulations and keep it up! For help on quitting smoking, please call [employer] at 6-568-QUITNOW (1-284.270.6938) or the American Cancer Society at 1-520.894.8160 to find local resources near you.  One-on-one health coaching:  If you d prefer to work individually with a health care provider on tobacco cessation, we offer:      Medication Therapy Management:  Our specially trained pharmacists work closely with you and your doctor to help you quit smoking.  Call [phone] or [phone] (toll free).

## 2024-02-12 ENCOUNTER — HOSPITAL ENCOUNTER (OUTPATIENT)
Dept: ULTRASOUND IMAGING | Facility: CLINIC | Age: 69
Discharge: HOME | End: 2024-02-12
Attending: PHYSICIAN ASSISTANT
Payer: COMMERCIAL

## 2024-02-12 ENCOUNTER — HOSPITAL ENCOUNTER (OUTPATIENT)
Dept: CT IMAGING | Facility: CLINIC | Age: 69
Discharge: HOME | End: 2024-02-12
Attending: PHYSICIAN ASSISTANT
Payer: COMMERCIAL

## 2024-02-12 DIAGNOSIS — I77.819 AORTIC ECTASIA (H): ICD-10-CM

## 2024-02-12 DIAGNOSIS — Z87.891 PERSONAL HISTORY OF TOBACCO USE: ICD-10-CM

## 2024-02-12 PROCEDURE — 76775 US EXAM ABDO BACK WALL LIM: CPT

## 2024-02-12 PROCEDURE — 71271 CT THORAX LUNG CANCER SCR C-: CPT

## 2024-02-15 ENCOUNTER — MYC MEDICAL ADVICE (OUTPATIENT)
Dept: FAMILY MEDICINE | Facility: CLINIC | Age: 69
End: 2024-02-15
Payer: COMMERCIAL

## 2024-04-17 ENCOUNTER — HOSPITAL ENCOUNTER (OUTPATIENT)
Dept: CARDIOLOGY | Facility: CLINIC | Age: 69
Discharge: HOME | End: 2024-04-17
Attending: INTERNAL MEDICINE
Payer: COMMERCIAL

## 2024-04-17 ENCOUNTER — HOSPITAL ENCOUNTER (OUTPATIENT)
Dept: NUCLEAR MEDICINE | Facility: CLINIC | Age: 69
Setting detail: NUCLEAR MEDICINE
Discharge: HOME | End: 2024-04-17
Attending: INTERNAL MEDICINE
Payer: COMMERCIAL

## 2024-04-17 DIAGNOSIS — I25.10 CORONARY ARTERY DISEASE INVOLVING NATIVE CORONARY ARTERY OF NATIVE HEART WITHOUT ANGINA PECTORIS: ICD-10-CM

## 2024-04-17 LAB
CV STRESS MAX HR HE: 144
LVEF ECHO: NORMAL
RATE PRESSURE PRODUCT: NORMAL
STRESS ANGINA INDEX: 0
STRESS ECHO BASELINE DIASTOLIC HE: 78
STRESS ECHO BASELINE HR: 56 BPM
STRESS ECHO BASELINE SYSTOLIC BP: 126
STRESS ECHO CALCULATED PERCENT HR: 96 %
STRESS ECHO LAST STRESS DIASTOLIC BP: 62
STRESS ECHO LAST STRESS SYSTOLIC BP: 176
STRESS ECHO POST ESTIMATED WORKLOAD: 9.5 METS
STRESS ECHO POST EXERCISE DUR MIN: 8 MIN
STRESS ECHO POST EXERCISE DUR SEC: 21 SEC
STRESS ECHO TARGET HR: 150

## 2024-04-17 PROCEDURE — 93306 TTE W/DOPPLER COMPLETE: CPT

## 2024-04-17 PROCEDURE — 93017 CV STRESS TEST TRACING ONLY: CPT

## 2024-04-17 PROCEDURE — 93018 CV STRESS TEST I&R ONLY: CPT | Performed by: INTERNAL MEDICINE

## 2024-04-17 PROCEDURE — 93016 CV STRESS TEST SUPVJ ONLY: CPT | Performed by: INTERNAL MEDICINE

## 2024-04-17 PROCEDURE — 343N000001 HC RX 343: Performed by: INTERNAL MEDICINE

## 2024-04-17 PROCEDURE — 93306 TTE W/DOPPLER COMPLETE: CPT | Mod: 26 | Performed by: INTERNAL MEDICINE

## 2024-04-17 PROCEDURE — 78452 HT MUSCLE IMAGE SPECT MULT: CPT | Mod: 26 | Performed by: INTERNAL MEDICINE

## 2024-04-17 PROCEDURE — 78452 HT MUSCLE IMAGE SPECT MULT: CPT

## 2024-04-17 PROCEDURE — A9502 TC99M TETROFOSMIN: HCPCS | Performed by: INTERNAL MEDICINE

## 2024-04-17 RX ADMIN — TETROFOSMIN 10.8 MILLICURIE: 1.38 INJECTION, POWDER, LYOPHILIZED, FOR SOLUTION INTRAVENOUS at 09:00

## 2024-04-17 RX ADMIN — TETROFOSMIN 32.6 MILLICURIE: 1.38 INJECTION, POWDER, LYOPHILIZED, FOR SOLUTION INTRAVENOUS at 11:00

## 2024-04-17 NOTE — RESULT ENCOUNTER NOTE
Negative for infarction or inducible ischemia. Follow up with Dr Nunez on 6/12/24. Pt notified via [employer]

## 2024-04-26 ENCOUNTER — OFFICE VISIT (OUTPATIENT)
Dept: SURGERY | Facility: CLINIC | Age: 69
End: 2024-04-26
Payer: COMMERCIAL

## 2024-04-26 VITALS
WEIGHT: 169 LBS | SYSTOLIC BLOOD PRESSURE: 145 MMHG | TEMPERATURE: 96.8 F | OXYGEN SATURATION: 98 % | BODY MASS INDEX: 25.61 KG/M2 | DIASTOLIC BLOOD PRESSURE: 79 MMHG | RESPIRATION RATE: 16 BRPM | HEIGHT: 68 IN | HEART RATE: 71 BPM

## 2024-04-26 DIAGNOSIS — C61 PROSTATE CANCER (H): ICD-10-CM

## 2024-04-26 DIAGNOSIS — Z92.3 S/P RADIATION THERAPY > 12 WKS AGO: ICD-10-CM

## 2024-04-26 DIAGNOSIS — K40.90 NON-RECURRENT UNILATERAL INGUINAL HERNIA WITHOUT OBSTRUCTION OR GANGRENE: Primary | ICD-10-CM

## 2024-04-26 PROCEDURE — 99214 OFFICE O/P EST MOD 30 MIN: CPT | Performed by: SURGERY

## 2024-04-26 NOTE — NURSING NOTE
"Chief Complaint   Patient presents with    Hernia     3 month follow up for Right inguinal hernia       Vitals:    04/26/24 1255   BP: (!) 151/86   Pulse: 71   Resp: 16   SpO2: 98%   Weight: 76.7 kg (169 lb)   Height: 1.727 m (5' 8\")     Wt Readings from Last 1 Encounters:   04/26/24 76.7 kg (169 lb)       Arely Louis MA      "

## 2024-04-26 NOTE — LETTER
4/26/2024         RE: Dillon Matias  [address] [address] [address]        Dear Colleague,    Thank you for referring your patient, Dillon Matias, to the [facility]. Please see a copy of my visit note below.      Assessment & Plan  Problem List Items Addressed This Visit          Urinary    Prostate cancer (H)     Other Visit Diagnoses       Non-recurrent unilateral inguinal hernia without obstruction or gangrene    -  Primary    Relevant Orders    Case Request: RIGHT HERNIORRHAPHY, INGUINAL, ROBOT-ASSISTED, LAPAROSCOPIC, USING DA [employer] XI (Completed)    S/P radiation therapy > 12 wks ago               70 yo with right inguinal hernia  -Pt is s/p radiation 2/2 to prostate cancer.  -He will be at least 6 months since the last radiation treatment  -Discussed with Dr. AMANDA Ohara, he recommends at least 6 months after radiation before any surgical intervention.     The patient was thoroughly counseled regarding Non-recurrent unilateral inguinal hernia without obstruction or gangrene [K40.90].     The patient was informed that the proposed procedure or medical intervention involves reduction and repair with or without mesh and does offer a very good likelihood of symptom relief. We also discussed the options of repairing the hernia laparoscopically, robotically, versus open. I recommend robotic inguinal.    The patient was made aware of the risks of the procedure, including but not limited to:  nerve entrapment or injury, persistence of pain (10%), injury to the bowel/bladder, infertility, ischemic orchitis (rare), Injury to the vas deferens (rare), hematoma, mesh migration, mesh infection, cardiac or pulmonary complication and anesthesia related complications also that difficulties may be encountered during recovery to include: wound infection, recurrence (5-10%), seroma, hematoma and chronic pain.     In the course of the evaluation we did discuss other therapeutic options with  "the patient, including continued watchful waiting. The risks and benefits of these options were also discussed which include but are not limited to: incarceration and/or strangulation..     Also discussed were possible problems or difficulties the patient may encounter if treatment was not pursued at this time.     The patient was [facility] for the procedure. Assistance during the procedure and during hospitalization may also be provided by other physicians, nurses and technicians.     We discussed risk factors of mesh infection -including uncontrolled diabetes, tobacco/nicotine use, immune suppressed medication usage, morbid obesity.  If the mesh becomes infected, it will need to come out surgically.  Then patient will need another hernia repair once here she recovers.    The patient will be provided additional education resources by the support staff. If there are ever any questions regarding their diagnosis or the procedure, the patient is encouraged to ask.     All of the patient s or their legal representative s questions have been answered to their satisfaction and they have indicated a clear understanding of this discussion.   Dillon expressed understanding of risks, benefits and alternatives and wished to proceed.     All findings, test results, and diagnosis were discussed with the patient. Dillon  participated in the decision making process and agreed with the plan of care. Questions were sought and answered.     Face to Face/patient Contact total time: 20 minutes  Pre Charting time: 5 minutes; Post charting time, communication and other activities: 5 minutes;   Total time:  30 minutes       BMI  Estimated body mass index is 25.7 kg/m  as calculated from the following:    Height as of this encounter: 1.727 m (5' 8\").    Weight as of this encounter: 76.7 kg (169 lb).         No follow-ups on file.      Subjective  Dillon is a 69 year old, presenting for the " "following health issues:  Hernia (3 month follow up for Right inguinal hernia)    Pt has 3 month follow for his hernia repair discussion  We are waiting until he is at least 6 months from his last dose of radiation.   Nothing changed expect the hernia content is more obvious  No other issues or changes since I last saw him.              Review of Systems  Constitutional, neuro, ENT, endocrine, pulmonary, cardiac, gastrointestinal, genitourinary, musculoskeletal, integument and psychiatric systems are negative, except as otherwise noted.      Objective   BP (!) 165/74   Pulse 71   Temp 96.8  F (36  C) (Tympanic)   Resp 16   Ht 1.727 m (5' 8\")   Wt 76.7 kg (169 lb)   SpO2 98%   BMI 25.70 kg/m    Body mass index is 25.7 kg/m .  Physical Exam   This is a discussion visit only.             Signed Electronically by: Bryan Alex MD        Again, thank you for allowing me to participate in the care of your patient.        Sincerely,        Bryan Alex MD  "

## 2024-04-26 NOTE — PROGRESS NOTES
Assessment & Plan   Problem List Items Addressed This Visit          Urinary    Prostate cancer (H)     Other Visit Diagnoses       Non-recurrent unilateral inguinal hernia without obstruction or gangrene    -  Primary    Relevant Orders    Case Request: RIGHT HERNIORRHAPHY, INGUINAL, ROBOT-ASSISTED, LAPAROSCOPIC, USING DA [employer] XI (Completed)    S/P radiation therapy > 12 wks ago               70 yo with right inguinal hernia  -Pt is s/p radiation 2/2 to prostate cancer.  -He will be at least 6 months since the last radiation treatment  -Discussed with Dr. AMANDA Ohara, he recommends at least 6 months after radiation before any surgical intervention.     The patient was thoroughly counseled regarding Non-recurrent unilateral inguinal hernia without obstruction or gangrene [K40.90].     The patient was informed that the proposed procedure or medical intervention involves reduction and repair with or without mesh and does offer a very good likelihood of symptom relief. We also discussed the options of repairing the hernia laparoscopically, robotically, versus open. I recommend robotic inguinal.    The patient was made aware of the risks of the procedure, including but not limited to:  nerve entrapment or injury, persistence of pain (10%), injury to the bowel/bladder, infertility, ischemic orchitis (rare), Injury to the vas deferens (rare), hematoma, mesh migration, mesh infection, cardiac or pulmonary complication and anesthesia related complications also that difficulties may be encountered during recovery to include: wound infection, recurrence (5-10%), seroma, hematoma and chronic pain.     In the course of the evaluation we did discuss other therapeutic options with the patient, including continued watchful waiting. The risks and benefits of these options were also discussed which include but are not limited to: incarceration and/or strangulation..     Also discussed were possible problems or difficulties the patient  "may encounter if treatment was not pursued at this time.     The [facility] procedure. Assistance during the procedure and during hospitalization may also be provided by other physicians, nurses and technicians.     We discussed risk factors of mesh infection -including uncontrolled diabetes, tobacco/nicotine use, immune suppressed medication usage, morbid obesity.  If the mesh becomes infected, it will need to come out surgically.  Then patient will need another hernia repair once here she recovers.    The patient will be provided additional education resources by the support staff. If there are ever any questions regarding their diagnosis or the procedure, the patient is encouraged to ask.     All of the patient s or their legal representative s questions have been answered to their satisfaction and they have indicated a clear understanding of this discussion.   Dillon expressed understanding of risks, benefits and alternatives and wished to proceed.     All findings, test results, and diagnosis were discussed with the patient. Dillon  participated in the decision making process and agreed with the plan of care. Questions were sought and answered.     Face to Face/patient Contact total time: 20 minutes  Pre Charting time: 5 minutes; Post charting time, communication and other activities: 5 minutes;   Total time:  30 minutes       BMI  Estimated body mass index is 25.7 kg/m  as calculated from the following:    Height as of this encounter: 1.727 m (5' 8\").    Weight as of this encounter: 76.7 kg (169 lb).         No follow-ups on file.      Subjective   Dillon is a 69 year old, presenting for the following health issues:  Hernia (3 month follow up for Right inguinal hernia)    Pt has 3 month follow for his hernia repair discussion  We are waiting until he is at least 6 months from his last dose of radiation.   Nothing changed expect the hernia " "content is more obvious  No other issues or changes since I last saw him.              Review of Systems  Constitutional, neuro, ENT, endocrine, pulmonary, cardiac, gastrointestinal, genitourinary, musculoskeletal, integument and psychiatric systems are negative, except as otherwise noted.      Objective    BP (!) 165/74   Pulse 71   Temp 96.8  F (36  C) (Tympanic)   Resp 16   Ht 1.727 m (5' 8\")   Wt 76.7 kg (169 lb)   SpO2 98%   BMI 25.70 kg/m    Body mass index is 25.7 kg/m .  Physical Exam   This is a discussion visit only.             Signed Electronically by: Bryan Alex MD    "

## 2024-04-29 ENCOUNTER — TELEPHONE (OUTPATIENT)
Dept: SURGERY | Facility: CLINIC | Age: 69
End: 2024-04-29
Payer: COMMERCIAL

## 2024-04-29 NOTE — TELEPHONE ENCOUNTER
Type of surgery: RIGHT HERNIORRHAPHY, INGUINAL, ROBOT-ASSISTED, LAPAROSCOPIC, USING DA [facility]NA XI   Location of surgery: [place] OR  Date and time of surgery: 5/30  Surgeon: Abi  Pre-Op Appt Date: 5/13  Post-Op Appt Date: 6/11   Packet sent out: Yes  Pre-cert/Authorization completed:  Not Applicable  Date: na

## 2024-05-13 ENCOUNTER — OFFICE VISIT (OUTPATIENT)
Dept: FAMILY MEDICINE | Facility: CLINIC | Age: 69
End: 2024-05-13
Payer: COMMERCIAL

## 2024-05-13 VITALS
OXYGEN SATURATION: 95 % | WEIGHT: 169 LBS | RESPIRATION RATE: 18 BRPM | HEART RATE: 58 BPM | DIASTOLIC BLOOD PRESSURE: 72 MMHG | TEMPERATURE: 96.4 F | SYSTOLIC BLOOD PRESSURE: 136 MMHG | BODY MASS INDEX: 25.7 KG/M2

## 2024-05-13 DIAGNOSIS — K40.90 RIGHT INGUINAL HERNIA: ICD-10-CM

## 2024-05-13 DIAGNOSIS — C61 PROSTATE CANCER (H): ICD-10-CM

## 2024-05-13 DIAGNOSIS — Z01.818 PREOP GENERAL PHYSICAL EXAM: Primary | ICD-10-CM

## 2024-05-13 DIAGNOSIS — I25.10 CORONARY ARTERY DISEASE INVOLVING NATIVE CORONARY ARTERY OF NATIVE HEART WITHOUT ANGINA PECTORIS: ICD-10-CM

## 2024-05-13 DIAGNOSIS — E78.5 DYSLIPIDEMIA: ICD-10-CM

## 2024-05-13 PROBLEM — G47.33 OBSTRUCTIVE SLEEP APNEA OF ADULT: Status: ACTIVE | Noted: 2023-08-16

## 2024-05-13 PROBLEM — G89.29 CHRONIC LEFT SHOULDER PAIN: Status: RESOLVED | Noted: 2020-02-07 | Resolved: 2024-05-13

## 2024-05-13 PROBLEM — M25.512 CHRONIC LEFT SHOULDER PAIN: Status: RESOLVED | Noted: 2020-02-07 | Resolved: 2024-05-13

## 2024-05-13 LAB
ANION GAP SERPL CALCULATED.3IONS-SCNC: 8 MMOL/L (ref 7–15)
BUN SERPL-MCNC: 14.9 MG/DL (ref 8–23)
CALCIUM SERPL-MCNC: 9.4 MG/DL (ref 8.8–10.2)
CHLORIDE SERPL-SCNC: 107 MMOL/L (ref 98–107)
CREAT SERPL-MCNC: 0.89 MG/DL (ref 0.67–1.17)
DEPRECATED HCO3 PLAS-SCNC: 26 MMOL/L (ref 22–29)
EGFRCR SERPLBLD CKD-EPI 2021: >90 ML/MIN/1.73M2
ERYTHROCYTE [DISTWIDTH] IN BLOOD BY AUTOMATED COUNT: 12.8 % (ref 10–15)
GLUCOSE SERPL-MCNC: 103 MG/DL (ref 70–99)
HCT VFR BLD AUTO: 43.5 % (ref 40–53)
HGB BLD-MCNC: 14.7 G/DL (ref 13.3–17.7)
MCH RBC QN AUTO: 30.1 PG (ref 26.5–33)
MCHC RBC AUTO-ENTMCNC: 33.8 G/DL (ref 31.5–36.5)
MCV RBC AUTO: 89 FL (ref 78–100)
PLATELET # BLD AUTO: 167 10E3/UL (ref 150–450)
POTASSIUM SERPL-SCNC: 4.1 MMOL/L (ref 3.4–5.3)
RBC # BLD AUTO: 4.89 10E6/UL (ref 4.4–5.9)
SODIUM SERPL-SCNC: 141 MMOL/L (ref 135–145)
WBC # BLD AUTO: 5.4 10E3/UL (ref 4–11)

## 2024-05-13 PROCEDURE — 99214 OFFICE O/P EST MOD 30 MIN: CPT | Performed by: PHYSICIAN ASSISTANT

## 2024-05-13 PROCEDURE — 36415 COLL VENOUS BLD VENIPUNCTURE: CPT | Performed by: PHYSICIAN ASSISTANT

## 2024-05-13 PROCEDURE — 85027 COMPLETE CBC AUTOMATED: CPT | Performed by: PHYSICIAN ASSISTANT

## 2024-05-13 PROCEDURE — 80048 BASIC METABOLIC PNL TOTAL CA: CPT | Performed by: PHYSICIAN ASSISTANT

## 2024-05-13 ASSESSMENT — PAIN SCALES - GENERAL: PAINLEVEL: MILD PAIN (2)

## 2024-05-13 NOTE — PATIENT INSTRUCTIONS
Preparing for Your Surgery  Getting started  A nurse will call you to review your health history and instructions. They will give you an arrival time based on your scheduled surgery time. Please be ready to share:  Your doctor's clinic name and phone number  Your medical, surgical, and anesthesia history  A list of allergies and sensitivities  A list of medicines, including herbal treatments and over-the-counter drugs  Whether the patient has a legal guardian (ask how to send us the papers in advance)  Please tell us if you're pregnant--or if there's any chance you might be pregnant. Some surgeries may injure a fetus (unborn baby), so they require a pregnancy test. Surgeries that are safe for a fetus don't always need a test, and you can choose whether to have one.   If you have a child who's having surgery, please ask for a copy of Preparing for Your Child's Surgery.    Preparing for surgery  Within 10 to 30 days of surgery: Have a pre-op exam (sometimes called an H&P, or History and Physical). This can be done at a clinic or pre-operative center.  If you're having a , you may not need this exam. Talk to your care team.  At your pre-op exam, talk to your care team about all medicines you take. If you need to stop any medicines before surgery, ask when to start taking them again.  We do this for your safety. Many medicines can make you bleed too much during surgery. Some change how well surgery (anesthesia) drugs work.  Call your insurance company to let them know you're having surgery. (If you don't have insurance, call [phone].)  Call your clinic if there's any change in your health. This includes signs of a cold or flu (sore throat, runny nose, cough, rash, fever). It also includes a scrape or scratch near the surgery site.  If you have questions on the day of surgery, call your hospital or surgery center.  Eating and drinking guidelines  For your safety: Unless your surgeon tells you otherwise,  follow the guidelines below.  Eat and drink as usual until 8 hours before you arrive for surgery. After that, no food or milk.  Drink clear liquids until 2 hours before you arrive. These are liquids you can see through, like water, Gatorade, and Propel Water. They also include plain black coffee and tea (no cream or milk), candy, and breath mints. You can spit out gum when you arrive.  If you drink alcohol: Stop drinking it the night before surgery.  If your care team tells you to take medicine on the morning of surgery, it's okay to take it with a sip of water.  Preventing infection  Shower or bathe the night before and morning of your surgery. Follow the instructions your clinic gave you. (If no instructions, use regular soap.)  Don't shave or clip hair near your surgery site. We'll remove the hair if needed.  Don't smoke or vape the morning of surgery. You may chew nicotine gum up to 2 hours before surgery. A nicotine patch is okay.  Note: Some surgeries require you to completely quit smoking and nicotine. Check with your surgeon.  Your care team will make every effort to keep you safe from infection. We will:  Clean our hands often with soap and water (or an alcohol-based hand rub).  Clean the skin at your surgery site with a special soap that kills germs.  Give you a special gown to keep you warm. (Cold raises the risk of infection.)  Wear special hair covers, masks, gowns and gloves during surgery.  Give antibiotic medicine, if prescribed. Not all surgeries need antibiotics.  What to bring on the day of surgery  Photo ID and insurance card  Copy of your health care directive, if you have one  Glasses and hearing aids (bring cases)  You can't wear contacts during surgery  Inhaler and eye drops, if you use them (tell us about these when you arrive)  CPAP machine or breathing device, if you use them  A few personal items, if spending the night  If you have . . .  A pacemaker, ICD (cardiac defibrillator) or other  implant: Bring the ID card.  An implanted stimulator: Bring the remote control.  A legal guardian: Bring a copy of the certified (court-stamped) guardianship papers.  Please remove any jewelry, including body piercings. Leave jewelry and other valuables at home.  If you're going home the day of surgery  You must have a responsible adult drive you home. They should stay with you overnight as well.  If you don't have someone to stay with you, and you aren't safe to go home alone, we may keep you overnight. Insurance often won't pay for this.  After surgery  If it's hard to control your pain or you need more pain medicine, please call your surgeon's office.  Questions?   If you have any questions for your care team, list them here: _________________________________________________________________________________________________________________________________________________________________________ ____________________________________ ____________________________________ ____________________________________  For informational purposes only. Not to replace the advice of your health care provider. Copyright   2003, 2019 [employer]. All rights reserved. Clinically reviewed by Page Cannon MD. [employer] 670404 - REV 12/22.    How to Take Your Medication Before Surgery  - HOLD (do not take) Aspirin for 7 days. Last dose is 5/22.

## 2024-05-13 NOTE — PROGRESS NOTES
Preoperative Evaluation  [facility]  [address] [place] [address]  Phone: [phone]  Fax: [phone]  Primary Provider: Paulette Gibbs  Pre-op Performing Provider: PAULETTE GIBBS  May 13, 2024     Dillon is a 69 year old, presenting for the following:  No chief complaint on file.        5/13/2024     8:57 AM   Additional Questions   Roomed by Shari CAMACHO CMA   Accompanied by Self       Surgical Information  Surgery/Procedure: RIGHT HERNIORRHAPHY, INGUINAL, ROBOT-ASSISTED, LAPAROSCOPIC, USING DA ANNA XI   Surgery Location: [facility]   Surgeon: Bryan Alex MD   Surgery Date: 05/30/2024  Time of Surgery: 12:15pm  Where patient plans to recover: At home with family  Fax number for surgical facility: Note does not need to be faxed, will be available electronically in Epic.    Assessment & Plan     The proposed surgical procedure is considered INTERMEDIATE risk.    Problem List Items Addressed This Visit          Endocrine    Dyslipidemia       Circulatory    Coronary artery disease involving native coronary artery of native heart without angina pectoris       Urinary    Prostate cancer (H)     Other Visit Diagnoses       Preop general physical exam    -  Primary    Relevant Orders    CBC with platelets (Completed)    Basic metabolic panel  (Ca, Cl, CO2, Creat, Gluc, K, Na, BUN)    Right inguinal hernia                        - No identified additional risk factors other than previously addressed    Antiplatelet or Anticoagulation Medication Instructions   - aspirin: Discontinue aspirin 7-10 days prior to procedure to reduce bleeding risk. It should be resumed postoperatively.     Additional Medication Instructions  Patient is to take all scheduled medications on the day of surgery    Recommendation  APPROVAL GIVEN to proceed with proposed procedure, without further diagnostic evaluation.      Subjective   HPI related to upcoming procedure:  Patient is a 69-year-old male with history of coronary artery disease status post stenting in 2015, dyslipidemia and prostate cancer with active monitoring who presents for preoperative evaluation for proposed laparoscopic right inguinal hernia repair.  Patient is in his usual state of health and denies any recent fevers or chills, chest pain or shortness of breath, URI symptoms, abdominal pain, nausea or vomiting, diarrhea constipation or any other concerning systemic sign or symptom.    Of note had an echocardiogram in nuclear stress test several weeks ago that was unremarkable.  He has been asymptomatic and this was done for routine monitoring of his CAD.        5/11/2024     9:17 AM   Preop Questions   1. Have you ever had a heart attack or stroke? YES -cardiac stent in 2015   2. Have you ever had surgery on your heart or blood vessels, such as a stent placement, a coronary artery bypass, or surgery on an artery in your head, neck, heart, or legs? YES -stenting 2015   3. Do you have chest pain with activity? No   4. Do you have a history of  heart failure? No   5. Do you currently have a cold, bronchitis or symptoms of other infection? No   6. Do you have a cough, shortness of breath, or wheezing? No   7. Do you or anyone in your family have previous history of blood clots? No   8. Do you or does anyone in your family have a serious bleeding problem such as prolonged bleeding following surgeries or cuts? No   9. Have you ever had problems with anemia or been told to take iron pills? No   10. Have you had any abnormal blood loss such as black, tarry or bloody stools? No   11. Have you ever had a blood transfusion? No   12. Are you willing to have a blood transfusion if it is medically needed before, during, or after your surgery? Yes   13. Have you or any of your relatives ever had problems with anesthesia? No   14. Do you have sleep apnea, excessive snoring or daytime drowsiness? YES   14a. Do you have a CPAP  machine? No   15. Do you have any artifical heart valves or other implanted medical devices like a pacemaker, defibrillator, or continuous glucose monitor? No   16. Do you have artificial joints? No   17. Are you allergic to latex? No       Health Care Directive  Patient has a Health Care Directive on file      Preoperative Review of    reviewed - no record of controlled substances prescribed.      Status of Chronic Conditions:  See problem list for active medical problems.  Problems all longstanding and stable, except as noted/documented.  See ROS for pertinent symptoms related to these conditions.    Patient Active Problem List    Diagnosis Date Noted    Aortic ectasia (H24) 01/29/2024     Priority: Medium    Prostate cancer (H) 01/16/2024     Priority: Medium    Actinic keratosis 02/07/2020     Priority: Medium    Chronic left shoulder pain 02/07/2020     Priority: Medium    History of tobacco abuse 02/07/2020     Priority: Medium    Other eczema 02/07/2020     Priority: Medium    Coronary artery disease involving native coronary artery of native heart without angina pectoris 06/15/2015     Priority: Medium    Dyslipidemia 06/10/2015     Priority: Medium      Past Medical History:   Diagnosis Date    Actinic keratosis     Basal cell carcinoma     Heart disease 07/2015     Past Surgical History:   Procedure Laterality Date    COLONOSCOPY      INSERT SEED MARKER / MATRIX N/A 9/8/2023    Procedure: Transrectal Ultrasound Guided Placement of Fiducials and SpaceOar;  Surgeon: Michael Almendarez MD;  Location: [facility] OR    ORTHOPEDIC SURGERY  08/2010    RIGHT KNEE     Current Outpatient Medications   Medication Sig Dispense Refill    acetaminophen (TYLENOL) 325 MG tablet Take 2 tablets (650 mg) by mouth every 4 hours as needed for mild pain 50 tablet 0    aspirin (ASA) 81 MG chewable tablet Take 1 tablet (81 mg) by mouth daily 90 tablet 3    atorvastatin (LIPITOR) 80 MG tablet Take 1 tablet (80 mg) by mouth  "every evening 90 tablet 3    Multiple Vitamin (MULTIVITAMIN ADULT PO) Take 1 tablet by mouth every morning      triamcinolone (KENALOG) 0.1 % external cream Apply to AA on the leg BID x 1-2 week then PRN only 454 g 2       No Known Allergies     Social History     Tobacco Use    Smoking status: Former     Current packs/day: 0.00     Average packs/day: 1 pack/day for 45.1 years (45.1 ttl pk-yrs)     Types: Cigarettes     Start date: 1970     Quit date: 7/15/2015     Years since quittin.8     Passive exposure: Past    Smokeless tobacco: Never   Substance Use Topics    Alcohol use: Not Currently     History   Drug Use Unknown         Review of Systems    Review of Systems  CONSTITUTIONAL: NEGATIVE for fever, chills, change in weight  INTEGUMENTARY/SKIN: NEGATIVE for worrisome rashes, moles or lesions  EYES: NEGATIVE for vision changes or irritation  ENT/MOUTH: NEGATIVE for ear, mouth and throat problems  RESP: NEGATIVE for significant cough or SOB  BREAST: NEGATIVE for masses, tenderness or discharge  CV: NEGATIVE for chest pain, palpitations or peripheral edema  GI: NEGATIVE for nausea, abdominal pain, heartburn, or change in bowel habits  : NEGATIVE for frequency, dysuria, or hematuria  MUSCULOSKELETAL: NEGATIVE for significant arthralgias or myalgia  NEURO: NEGATIVE for weakness, dizziness or paresthesias  ENDOCRINE: NEGATIVE for temperature intolerance, skin/hair changes  HEME: NEGATIVE for bleeding problems  PSYCHIATRIC: NEGATIVE for changes in mood or affect    Objective    /72 (BP Location: Right arm, Patient Position: Sitting, Cuff Size: Adult Regular)   Pulse 58   Temp (!) 96.4  F (35.8  C) (Tympanic)   Resp 18   Wt 76.7 kg (169 lb)   SpO2 95%   BMI 25.70 kg/m     Estimated body mass index is 25.7 kg/m  as calculated from the following:    Height as of 24: 1.727 m (5' 8\").    Weight as of this encounter: 76.7 kg (169 lb).  Physical Exam  GENERAL: alert and no distress  EYES: Eyes " grossly normal to inspection, PERRL and conjunctivae and sclerae normal  HENT: ear canals and TM's normal, nose and mouth without ulcers or lesions  NECK: no adenopathy, no asymmetry, masses, or scars  RESP: lungs clear to auscultation - no rales, rhonchi or wheezes  CV: regular rate and rhythm, normal S1 S2, no S3 or S4, no murmur, click or rub, no peripheral edema  ABDOMEN: soft, nontender, no hepatosplenomegaly, no masses and bowel sounds normal  MS: no gross musculoskeletal defects noted, no edema    Recent Labs   Lab Test 01/29/24  1008 09/03/23  0952   HGB  --  14.5   PLT  --  182    139   POTASSIUM 3.9 4.0   CR 0.93 0.88        Diagnostics  Results for orders placed or performed in visit on 05/13/24   CBC with platelets     Status: Normal   Result Value Ref Range    WBC Count 5.4 4.0 - 11.0 10e3/uL    RBC Count 4.89 4.40 - 5.90 10e6/uL    Hemoglobin 14.7 13.3 - 17.7 g/dL    Hematocrit 43.5 40.0 - 53.0 %    MCV 89 78 - 100 fL    MCH 30.1 26.5 - 33.0 pg    MCHC 33.8 31.5 - 36.5 g/dL    RDW 12.8 10.0 - 15.0 %    Platelet Count 167 150 - 450 10e3/uL       No EKG this visit, completed in the last 90 days.    Revised Cardiac Risk Index (RCRI)  The patient has the following serious cardiovascular risks for perioperative complications:   - Coronary Artery Disease (MI, positive stress test, angina, Qs on EKG) = 1 point     RCRI Interpretation: 1 point: Class II (low risk - 0.9% complication rate)       Signed Electronically by: Antonio Hernandez PA-C  Copy of this evaluation report is provided to requesting physician.

## 2024-05-13 NOTE — H&P (VIEW-ONLY)
Preoperative Evaluation  [facility]  [address] [place] [address]  Phone: [phone]  Fax: [phone]  Primary Provider: Paulette Gibbs  Pre-op Performing Provider: PAULETTE GIBBS  May 13, 2024     Dillon is a 69 year old, presenting for the following:  No chief complaint on file.        5/13/2024     8:57 AM   Additional Questions   Roomed by Shari CAMACHO CMA   Accompanied by Self       Surgical Information  Surgery/Procedure: RIGHT HERNIORRHAPHY, INGUINAL, ROBOT-ASSISTED, LAPAROSCOPIC, USING DA ANNA XI   Surgery Location: [facility]   Surgeon: Bryan Alex MD   Surgery Date: 05/30/2024  Time of Surgery: 12:15pm  Where patient plans to recover: At home with family  Fax number for surgical facility: Note does not need to be faxed, will be available electronically in Epic.    Assessment & Plan     The proposed surgical procedure is considered INTERMEDIATE risk.    Problem List Items Addressed This Visit          Endocrine    Dyslipidemia       Circulatory    Coronary artery disease involving native coronary artery of native heart without angina pectoris       Urinary    Prostate cancer (H)     Other Visit Diagnoses       Preop general physical exam    -  Primary    Relevant Orders    CBC with platelets (Completed)    Basic metabolic panel  (Ca, Cl, CO2, Creat, Gluc, K, Na, BUN)    Right inguinal hernia                        - No identified additional risk factors other than previously addressed    Antiplatelet or Anticoagulation Medication Instructions   - aspirin: Discontinue aspirin 7-10 days prior to procedure to reduce bleeding risk. It should be resumed postoperatively.     Additional Medication Instructions  Patient is to take all scheduled medications on the day of surgery    Recommendation  APPROVAL GIVEN to proceed with proposed procedure, without further diagnostic evaluation.      Subjective   HPI related to upcoming procedure:  Patient is a 69-year-old male with history of coronary artery disease status post stenting in 2015, dyslipidemia and prostate cancer with active monitoring who presents for preoperative evaluation for proposed laparoscopic right inguinal hernia repair.  Patient is in his usual state of health and denies any recent fevers or chills, chest pain or shortness of breath, URI symptoms, abdominal pain, nausea or vomiting, diarrhea constipation or any other concerning systemic sign or symptom.    Of note had an echocardiogram in nuclear stress test several weeks ago that was unremarkable.  He has been asymptomatic and this was done for routine monitoring of his CAD.        5/11/2024     9:17 AM   Preop Questions   1. Have you ever had a heart attack or stroke? YES -cardiac stent in 2015   2. Have you ever had surgery on your heart or blood vessels, such as a stent placement, a coronary artery bypass, or surgery on an artery in your head, neck, heart, or legs? YES -stenting 2015   3. Do you have chest pain with activity? No   4. Do you have a history of  heart failure? No   5. Do you currently have a cold, bronchitis or symptoms of other infection? No   6. Do you have a cough, shortness of breath, or wheezing? No   7. Do you or anyone in your family have previous history of blood clots? No   8. Do you or does anyone in your family have a serious bleeding problem such as prolonged bleeding following surgeries or cuts? No   9. Have you ever had problems with anemia or been told to take iron pills? No   10. Have you had any abnormal blood loss such as black, tarry or bloody stools? No   11. Have you ever had a blood transfusion? No   12. Are you willing to have a blood transfusion if it is medically needed before, during, or after your surgery? Yes   13. Have you or any of your relatives ever had problems with anesthesia? No   14. Do you have sleep apnea, excessive snoring or daytime drowsiness? YES   14a. Do you have a CPAP  machine? No   15. Do you have any artifical heart valves or other implanted medical devices like a pacemaker, defibrillator, or continuous glucose monitor? No   16. Do you have artificial joints? No   17. Are you allergic to latex? No       Health Care Directive  Patient has a Health Care Directive on file      Preoperative Review of    reviewed - no record of controlled substances prescribed.      Status of Chronic Conditions:  See problem list for active medical problems.  Problems all longstanding and stable, except as noted/documented.  See ROS for pertinent symptoms related to these conditions.    Patient Active Problem List    Diagnosis Date Noted    Aortic ectasia (H24) 01/29/2024     Priority: Medium    Prostate cancer (H) 01/16/2024     Priority: Medium    Actinic keratosis 02/07/2020     Priority: Medium    Chronic left shoulder pain 02/07/2020     Priority: Medium    History of tobacco abuse 02/07/2020     Priority: Medium    Other eczema 02/07/2020     Priority: Medium    Coronary artery disease involving native coronary artery of native heart without angina pectoris 06/15/2015     Priority: Medium    Dyslipidemia 06/10/2015     Priority: Medium      Past Medical History:   Diagnosis Date    Actinic keratosis     Basal cell carcinoma     Heart disease 07/2015     Past Surgical History:   Procedure Laterality Date    COLONOSCOPY      INSERT SEED MARKER / MATRIX N/A 9/8/2023    Procedure: Transrectal Ultrasound Guided Placement of Fiducials and SpaceOar;  Surgeon: Michael Almendarez MD;  Location: [facility]    ORTHOPEDIC SURGERY  08/2010    RIGHT KNEE     Current Outpatient Medications   Medication Sig Dispense Refill    acetaminophen (TYLENOL) 325 MG tablet Take 2 tablets (650 mg) by mouth every 4 hours as needed for mild pain 50 tablet 0    aspirin (ASA) 81 MG chewable tablet Take 1 tablet (81 mg) by mouth daily 90 tablet 3    atorvastatin (LIPITOR) 80 MG tablet Take 1 tablet (80 mg) by mouth  "every evening 90 tablet 3    Multiple Vitamin (MULTIVITAMIN ADULT PO) Take 1 tablet by mouth every morning      triamcinolone (KENALOG) 0.1 % external cream Apply to AA on the leg BID x 1-2 week then PRN only 454 g 2       No Known Allergies     Social History     Tobacco Use    Smoking status: Former     Current packs/day: 0.00     Average packs/day: 1 pack/day for 45.1 years (45.1 ttl pk-yrs)     Types: Cigarettes     Start date: 1970     Quit date: 7/15/2015     Years since quittin.8     Passive exposure: Past    Smokeless tobacco: Never   Substance Use Topics    Alcohol use: Not Currently     History   Drug Use Unknown         Review of Systems    Review of Systems  CONSTITUTIONAL: NEGATIVE for fever, chills, change in weight  INTEGUMENTARY/SKIN: NEGATIVE for worrisome rashes, moles or lesions  EYES: NEGATIVE for vision changes or irritation  ENT/MOUTH: NEGATIVE for ear, mouth and throat problems  RESP: NEGATIVE for significant cough or SOB  BREAST: NEGATIVE for masses, tenderness or discharge  CV: NEGATIVE for chest pain, palpitations or peripheral edema  GI: NEGATIVE for nausea, abdominal pain, heartburn, or change in bowel habits  : NEGATIVE for frequency, dysuria, or hematuria  MUSCULOSKELETAL: NEGATIVE for significant arthralgias or myalgia  NEURO: NEGATIVE for weakness, dizziness or paresthesias  ENDOCRINE: NEGATIVE for temperature intolerance, skin/hair changes  HEME: NEGATIVE for bleeding problems  PSYCHIATRIC: NEGATIVE for changes in mood or affect    Objective    /72 (BP Location: Right arm, Patient Position: Sitting, Cuff Size: Adult Regular)   Pulse 58   Temp (!) 96.4  F (35.8  C) (Tympanic)   Resp 18   Wt 76.7 kg (169 lb)   SpO2 95%   BMI 25.70 kg/m     Estimated body mass index is 25.7 kg/m  as calculated from the following:    Height as of 24: 1.727 m (5' 8\").    Weight as of this encounter: 76.7 kg (169 lb).  Physical Exam  GENERAL: alert and no distress  EYES: Eyes " grossly normal to inspection, PERRL and conjunctivae and sclerae normal  HENT: ear canals and TM's normal, nose and mouth without ulcers or lesions  NECK: no adenopathy, no asymmetry, masses, or scars  RESP: lungs clear to auscultation - no rales, rhonchi or wheezes  CV: regular rate and rhythm, normal S1 S2, no S3 or S4, no murmur, click or rub, no peripheral edema  ABDOMEN: soft, nontender, no hepatosplenomegaly, no masses and bowel sounds normal  MS: no gross musculoskeletal defects noted, no edema    Recent Labs   Lab Test 01/29/24  1008 09/03/23  0952   HGB  --  14.5   PLT  --  182    139   POTASSIUM 3.9 4.0   CR 0.93 0.88        Diagnostics  Results for orders placed or performed in visit on 05/13/24   CBC with platelets     Status: Normal   Result Value Ref Range    WBC Count 5.4 4.0 - 11.0 10e3/uL    RBC Count 4.89 4.40 - 5.90 10e6/uL    Hemoglobin 14.7 13.3 - 17.7 g/dL    Hematocrit 43.5 40.0 - 53.0 %    MCV 89 78 - 100 fL    MCH 30.1 26.5 - 33.0 pg    MCHC 33.8 31.5 - 36.5 g/dL    RDW 12.8 10.0 - 15.0 %    Platelet Count 167 150 - 450 10e3/uL       No EKG this visit, completed in the last 90 days.    Revised Cardiac Risk Index (RCRI)  The patient has the following serious cardiovascular risks for perioperative complications:   - Coronary Artery Disease (MI, positive stress test, angina, Qs on EKG) = 1 point     RCRI Interpretation: 1 point: Class II (low risk - 0.9% complication rate)       Signed Electronically by: Antonio Hernandez PA-C  Copy of this evaluation report is provided to requesting physician.

## 2024-05-26 ENCOUNTER — ANESTHESIA EVENT (OUTPATIENT)
Dept: SURGERY | Facility: CLINIC | Age: 69
End: 2024-05-26
Payer: COMMERCIAL

## 2024-05-26 ASSESSMENT — LIFESTYLE VARIABLES: TOBACCO_USE: 1

## 2024-05-26 NOTE — ANESTHESIA PREPROCEDURE EVALUATION
Anesthesia Pre-Procedure Evaluation    Patient: Dillon Matias   MRN: [id number] : 1955        Procedure : Procedure(s):  RIGHT HERNIORRHAPHY, INGUINAL, ROBOT-ASSISTED, LAPAROSCOPIC, USING DA ANNA XI          Past Medical History:   Diagnosis Date     Actinic keratosis      Basal cell carcinoma      Heart disease 2015      Past Surgical History:   Procedure Laterality Date     COLONOSCOPY       INSERT SEED MARKER / MATRIX N/A 2023    Procedure: Transrectal Ultrasound Guided Placement of Fiducials and SpaceOar;  Surgeon: Michael Almendarez MD;  Location: [facility]     ORTHOPEDIC SURGERY  2010    RIGHT KNEE      No Known Allergies   Social History     Tobacco Use     Smoking status: Former     Current packs/day: 0.00     Average packs/day: 1 pack/day for 45.1 years (45.1 ttl pk-yrs)     Types: Cigarettes     Start date: 1970     Quit date: 7/15/2015     Years since quittin.8     Passive exposure: Past     Smokeless tobacco: Never   Substance Use Topics     Alcohol use: Not Currently      Wt Readings from Last 1 Encounters:   24 76.7 kg (169 lb)        Anesthesia Evaluation   Pt has had prior anesthetic. Type: General, MAC and Regional.        ROS/MED HX  ENT/Pulmonary:     (+) sleep apnea,               tobacco use, Past use,                       Neurologic:       Cardiovascular: Comment: Aortic ectasia (H24)      (+) Dyslipidemia - -  CAD -  - -                           valvular problems/murmurs type: MR TR, AZ.    Previous cardiac testing   Echo: Date: 24 Results:  Interpretation Summary     1. The left ventricle is normal in size. Proximal septal thickening is noted.  Left ventricular systolic function is normal. The visual ejection fraction is  55-60%. Diastolic Doppler findings (E/E' ratio and/or other parameters)  suggest left ventricular filling pressures are normal. No regional wall motion  abnormalities noted.  2. The right ventricle is normal size. The right  ventricular systolic function  is normal.  3. Mild mitral and tricuspid regurgitation.  4. Aortic root dilatation is present.  5. No pericardial effusion.  6. No previous study for comparison.  ______________________________________________________________________________  Left Ventricle  The left ventricle is normal in size. Proximal septal thickening is noted.  Left ventricular systolic function is normal. The visual ejection fraction is  55-60%. Diastolic Doppler findings (E/E' ratio and/or other parameters)  suggest left ventricular filling pressures are normal. No regional wall motion  abnormalities noted.     Right Ventricle  The right ventricle is normal size. The right ventricular systolic function is  normal.     Atria  Normal left atrial size. Right atrial size is normal. There is no color  Doppler evidence of an atrial shunt.     Mitral Valve  There is mild (1+) mitral regurgitation.     Tricuspid Valve  There is mild (1+) tricuspid regurgitation. The right ventricular systolic  pressure is approximated at 19.7 mmHg plus the right atrial pressure.     Aortic Valve  There is mild trileaflet aortic sclerosis. There is trace aortic  regurgitation. No aortic stenosis is present.     Pulmonic Valve  There is mild (1+) pulmonic valvular regurgitation. There is no pulmonic  valvular stenosis.     Vessels  Aortic root dilatation is present. Normal size ascending aorta. Descending  aortic velocity normal. The inferior vena cava is normal.     Pericardium  There is no pericardial effusion.     Rhythm  Sinus rhythm was noted.    Stress Test:  Date: Results:    ECG Reviewed:  Date: 4/17/23 Results:  Sinus  Rhythm   WITHIN NORMAL LIMITS  Cath:  Date: Results:      METS/Exercise Tolerance:     Hematologic:       Musculoskeletal:   (+)  arthritis,             GI/Hepatic:       Renal/Genitourinary:       Endo:       Psychiatric/Substance Use:       Infectious Disease:       Malignancy:   (+) Malignancy, History of Skin  "and Prostate.Skin CA Remission status post Surgery.      Other:            Physical Exam    Airway        Mallampati: II   TM distance: > 3 FB   Neck ROM: full   Mouth opening: > 3 cm    Respiratory Devices and Support         Dental       (+) Minor Abnormalities - some fillings, tiny chips      Cardiovascular   cardiovascular exam normal          Pulmonary   pulmonary exam normal            OUTSIDE LABS:  CBC:   Lab Results   Component Value Date    WBC 5.4 05/13/2024    WBC 6.0 09/03/2023    HGB 14.7 05/13/2024    HGB 14.5 09/03/2023    HCT 43.5 05/13/2024    HCT 43.2 09/03/2023     05/13/2024     09/03/2023     BMP:   Lab Results   Component Value Date     05/13/2024     01/29/2024    POTASSIUM 4.1 05/13/2024    POTASSIUM 3.9 01/29/2024    CHLORIDE 107 05/13/2024    CHLORIDE 104 01/29/2024    CO2 26 05/13/2024    CO2 25 01/29/2024    BUN 14.9 05/13/2024    BUN 13.4 01/29/2024    CR 0.89 05/13/2024    CR 0.93 01/29/2024     (H) 05/13/2024     (H) 01/29/2024     COAGS: No results found for: \"PTT\", \"INR\", \"FIBR\"  POC: No results found for: \"BGM\", \"HCG\", \"HCGS\"  HEPATIC:   Lab Results   Component Value Date    ALBUMIN 4.3 01/29/2024    PROTTOTAL 6.9 01/29/2024    ALT 27 01/29/2024    AST 26 01/29/2024    ALKPHOS 140 01/29/2024    BILITOTAL 0.7 01/29/2024     OTHER:   Lab Results   Component Value Date    SABRA 9.4 05/13/2024    SED 1 12/31/2004       Anesthesia Plan    ASA Status:  3       Anesthesia Type: General.     - Airway: ETT   Induction: Intravenous.   Maintenance: TIVA.        Consents    Anesthesia Plan(s) and associated risks, benefits, and realistic alternatives discussed. Questions answered and patient/representative(s) expressed understanding.     - Discussed: Risks, Benefits and Alternatives for BOTH SEDATION and the PROCEDURE were discussed     - Discussed with:  Patient            Postoperative Care    Pain management: Oral pain medications, IV analgesics, " "Multi-modal analgesia.   PONV prophylaxis: Ondansetron (or other 5HT-3), Dexamethasone or Solumedrol, Background Propofol Infusion     Comments:             SRIRAM Gutierrez CRNA    I have reviewed the pertinent notes and labs in the chart from the past 30 days and (re)examined the patient.  Any updates or changes from those notes are reflected in this note.              # Overweight: Estimated body mass index is 25.7 kg/m  as calculated from the following:    Height as of 4/26/24: 1.727 m (5' 8\").    Weight as of 5/13/24: 76.7 kg (169 lb).      "

## 2024-05-30 ENCOUNTER — ANESTHESIA (OUTPATIENT)
Dept: SURGERY | Facility: CLINIC | Age: 69
End: 2024-05-30
Payer: COMMERCIAL

## 2024-05-30 ENCOUNTER — HOSPITAL ENCOUNTER (OUTPATIENT)
Facility: CLINIC | Age: 69
Discharge: HOME | End: 2024-05-30
Attending: SURGERY | Admitting: SURGERY
Payer: COMMERCIAL

## 2024-05-30 VITALS
TEMPERATURE: 95.7 F | OXYGEN SATURATION: 96 % | DIASTOLIC BLOOD PRESSURE: 58 MMHG | WEIGHT: 165 LBS | RESPIRATION RATE: 15 BRPM | HEART RATE: 56 BPM | HEIGHT: 67 IN | SYSTOLIC BLOOD PRESSURE: 98 MMHG | BODY MASS INDEX: 25.9 KG/M2

## 2024-05-30 DIAGNOSIS — Z98.890 S/P RIGHT INGUINAL HERNIA REPAIR: Primary | ICD-10-CM

## 2024-05-30 DIAGNOSIS — Z87.19 S/P RIGHT INGUINAL HERNIA REPAIR: Primary | ICD-10-CM

## 2024-05-30 PROCEDURE — C1781 MESH (IMPLANTABLE): HCPCS | Performed by: SURGERY

## 2024-05-30 PROCEDURE — 258N000003 HC RX IP 258 OP 636: Performed by: NURSE ANESTHETIST, CERTIFIED REGISTERED

## 2024-05-30 PROCEDURE — 710N000012 HC RECOVERY PHASE 2, PER MINUTE: Performed by: SURGERY

## 2024-05-30 PROCEDURE — 49650 LAP ING HERNIA REPAIR INIT: CPT | Mod: RT | Performed by: SURGERY

## 2024-05-30 PROCEDURE — S2900 ROBOTIC SURGICAL SYSTEM: HCPCS | Performed by: SURGERY

## 2024-05-30 PROCEDURE — 710N000009 HC RECOVERY PHASE 1, LEVEL 1, PER MIN: Performed by: SURGERY

## 2024-05-30 PROCEDURE — C1894 INTRO/SHEATH, NON-LASER: HCPCS | Performed by: SURGERY

## 2024-05-30 PROCEDURE — 250N000011 HC RX IP 250 OP 636

## 2024-05-30 PROCEDURE — 258N000003 HC RX IP 258 OP 636

## 2024-05-30 PROCEDURE — 250N000011 HC RX IP 250 OP 636: Performed by: NURSE ANESTHETIST, CERTIFIED REGISTERED

## 2024-05-30 PROCEDURE — 250N000011 HC RX IP 250 OP 636: Performed by: SURGERY

## 2024-05-30 PROCEDURE — 250N000009 HC RX 250

## 2024-05-30 PROCEDURE — 360N000080 HC SURGERY LEVEL 7, PER MIN: Performed by: SURGERY

## 2024-05-30 PROCEDURE — 999N000141 HC STATISTIC PRE-PROCEDURE NURSING ASSESSMENT: Performed by: SURGERY

## 2024-05-30 PROCEDURE — 370N000017 HC ANESTHESIA TECHNICAL FEE, PER MIN: Performed by: SURGERY

## 2024-05-30 PROCEDURE — 250N000013 HC RX MED GY IP 250 OP 250 PS 637: Performed by: NURSE ANESTHETIST, CERTIFIED REGISTERED

## 2024-05-30 PROCEDURE — 272N000001 HC OR GENERAL SUPPLY STERILE: Performed by: SURGERY

## 2024-05-30 PROCEDURE — 250N000013 HC RX MED GY IP 250 OP 250 PS 637: Performed by: SURGERY

## 2024-05-30 DEVICE — ANATOMICAL MESH PRE-SHAPED MONOFILAMENT POLYPROPYLENE TEXTILE WITH MARKING;RIGHT SIDE
Type: IMPLANTABLE DEVICE | Site: ABDOMEN | Status: FUNCTIONAL
Brand: DEXTILE

## 2024-05-30 RX ORDER — ACETAMINOPHEN 325 MG/1
975 TABLET ORAL ONCE
Status: DISCONTINUED | OUTPATIENT
Start: 2024-05-30 | End: 2024-05-30

## 2024-05-30 RX ORDER — OXYCODONE HYDROCHLORIDE 5 MG/1
5 TABLET ORAL
Status: DISCONTINUED | OUTPATIENT
Start: 2024-05-30 | End: 2024-05-30 | Stop reason: HOSPADM

## 2024-05-30 RX ORDER — AMOXICILLIN 250 MG
1-2 CAPSULE ORAL 2 TIMES DAILY
Qty: 30 TABLET | Refills: 0 | Status: SHIPPED | OUTPATIENT
Start: 2024-05-30

## 2024-05-30 RX ORDER — ONDANSETRON 2 MG/ML
4 INJECTION INTRAMUSCULAR; INTRAVENOUS EVERY 30 MIN PRN
Status: DISCONTINUED | OUTPATIENT
Start: 2024-05-30 | End: 2024-05-30 | Stop reason: HOSPADM

## 2024-05-30 RX ORDER — CEFAZOLIN SODIUM/WATER 2 G/20 ML
2 SYRINGE (ML) INTRAVENOUS
Status: COMPLETED | OUTPATIENT
Start: 2024-05-30 | End: 2024-05-30

## 2024-05-30 RX ORDER — MEPERIDINE HYDROCHLORIDE 25 MG/ML
INJECTION INTRAMUSCULAR; INTRAVENOUS; SUBCUTANEOUS PRN
Status: DISCONTINUED | OUTPATIENT
Start: 2024-05-30 | End: 2024-05-30

## 2024-05-30 RX ORDER — CEFAZOLIN SODIUM/WATER 2 G/20 ML
2 SYRINGE (ML) INTRAVENOUS SEE ADMIN INSTRUCTIONS
Status: DISCONTINUED | OUTPATIENT
Start: 2024-05-30 | End: 2024-05-30 | Stop reason: HOSPADM

## 2024-05-30 RX ORDER — FENTANYL CITRATE 50 UG/ML
25 INJECTION, SOLUTION INTRAMUSCULAR; INTRAVENOUS EVERY 5 MIN PRN
Status: DISCONTINUED | OUTPATIENT
Start: 2024-05-30 | End: 2024-05-30 | Stop reason: HOSPADM

## 2024-05-30 RX ORDER — NALOXONE HYDROCHLORIDE 0.4 MG/ML
0.1 INJECTION, SOLUTION INTRAMUSCULAR; INTRAVENOUS; SUBCUTANEOUS
Status: DISCONTINUED | OUTPATIENT
Start: 2024-05-30 | End: 2024-05-30 | Stop reason: HOSPADM

## 2024-05-30 RX ORDER — ONDANSETRON 4 MG/1
4 TABLET, ORALLY DISINTEGRATING ORAL EVERY 30 MIN PRN
Status: DISCONTINUED | OUTPATIENT
Start: 2024-05-30 | End: 2024-05-30 | Stop reason: HOSPADM

## 2024-05-30 RX ORDER — LIDOCAINE 40 MG/G
CREAM TOPICAL
Status: DISCONTINUED | OUTPATIENT
Start: 2024-05-30 | End: 2024-05-30 | Stop reason: HOSPADM

## 2024-05-30 RX ORDER — HYDROMORPHONE HCL IN WATER/PF 6 MG/30 ML
0.4 PATIENT CONTROLLED ANALGESIA SYRINGE INTRAVENOUS EVERY 5 MIN PRN
Status: DISCONTINUED | OUTPATIENT
Start: 2024-05-30 | End: 2024-05-30 | Stop reason: HOSPADM

## 2024-05-30 RX ORDER — GABAPENTIN 100 MG/1
100 CAPSULE ORAL
Status: COMPLETED | OUTPATIENT
Start: 2024-05-30 | End: 2024-05-30

## 2024-05-30 RX ORDER — DEXAMETHASONE SODIUM PHOSPHATE 4 MG/ML
INJECTION, SOLUTION INTRA-ARTICULAR; INTRALESIONAL; INTRAMUSCULAR; INTRAVENOUS; SOFT TISSUE PRN
Status: DISCONTINUED | OUTPATIENT
Start: 2024-05-30 | End: 2024-05-30

## 2024-05-30 RX ORDER — LIDOCAINE HYDROCHLORIDE 20 MG/ML
INJECTION, SOLUTION INFILTRATION; PERINEURAL PRN
Status: DISCONTINUED | OUTPATIENT
Start: 2024-05-30 | End: 2024-05-30

## 2024-05-30 RX ORDER — DEXAMETHASONE SODIUM PHOSPHATE 4 MG/ML
4 INJECTION, SOLUTION INTRA-ARTICULAR; INTRALESIONAL; INTRAMUSCULAR; INTRAVENOUS; SOFT TISSUE
Status: DISCONTINUED | OUTPATIENT
Start: 2024-05-30 | End: 2024-05-30 | Stop reason: HOSPADM

## 2024-05-30 RX ORDER — SODIUM CHLORIDE, SODIUM LACTATE, POTASSIUM CHLORIDE, CALCIUM CHLORIDE 600; 310; 30; 20 MG/100ML; MG/100ML; MG/100ML; MG/100ML
INJECTION, SOLUTION INTRAVENOUS CONTINUOUS
Status: DISCONTINUED | OUTPATIENT
Start: 2024-05-30 | End: 2024-05-30 | Stop reason: HOSPADM

## 2024-05-30 RX ORDER — PROPOFOL 10 MG/ML
INJECTION, EMULSION INTRAVENOUS CONTINUOUS PRN
Status: DISCONTINUED | OUTPATIENT
Start: 2024-05-30 | End: 2024-05-30

## 2024-05-30 RX ORDER — ONDANSETRON 2 MG/ML
INJECTION INTRAMUSCULAR; INTRAVENOUS PRN
Status: DISCONTINUED | OUTPATIENT
Start: 2024-05-30 | End: 2024-05-30

## 2024-05-30 RX ORDER — KETAMINE HYDROCHLORIDE 10 MG/ML
INJECTION INTRAMUSCULAR; INTRAVENOUS PRN
Status: DISCONTINUED | OUTPATIENT
Start: 2024-05-30 | End: 2024-05-30

## 2024-05-30 RX ORDER — BUPIVACAINE HYDROCHLORIDE 2.5 MG/ML
INJECTION, SOLUTION INFILTRATION; PERINEURAL PRN
Status: DISCONTINUED | OUTPATIENT
Start: 2024-05-30 | End: 2024-05-30 | Stop reason: HOSPADM

## 2024-05-30 RX ORDER — EPHEDRINE SULFATE 50 MG/ML
INJECTION, SOLUTION INTRAMUSCULAR; INTRAVENOUS; SUBCUTANEOUS PRN
Status: DISCONTINUED | OUTPATIENT
Start: 2024-05-30 | End: 2024-05-30

## 2024-05-30 RX ORDER — ACETAMINOPHEN 325 MG/1
975 TABLET ORAL ONCE
Status: COMPLETED | OUTPATIENT
Start: 2024-05-30 | End: 2024-05-30

## 2024-05-30 RX ORDER — MAGNESIUM SULFATE HEPTAHYDRATE 40 MG/ML
2 INJECTION, SOLUTION INTRAVENOUS ONCE
Status: COMPLETED | OUTPATIENT
Start: 2024-05-30 | End: 2024-05-30

## 2024-05-30 RX ORDER — KETOROLAC TROMETHAMINE 30 MG/ML
INJECTION, SOLUTION INTRAMUSCULAR; INTRAVENOUS PRN
Status: DISCONTINUED | OUTPATIENT
Start: 2024-05-30 | End: 2024-05-30

## 2024-05-30 RX ORDER — HYDROMORPHONE HCL IN WATER/PF 6 MG/30 ML
0.2 PATIENT CONTROLLED ANALGESIA SYRINGE INTRAVENOUS EVERY 5 MIN PRN
Status: DISCONTINUED | OUTPATIENT
Start: 2024-05-30 | End: 2024-05-30 | Stop reason: HOSPADM

## 2024-05-30 RX ORDER — OXYCODONE HYDROCHLORIDE 5 MG/1
5 TABLET ORAL EVERY 6 HOURS PRN
Qty: 12 TABLET | Refills: 0 | Status: SHIPPED | OUTPATIENT
Start: 2024-05-30

## 2024-05-30 RX ORDER — FENTANYL CITRATE 50 UG/ML
50 INJECTION, SOLUTION INTRAMUSCULAR; INTRAVENOUS EVERY 5 MIN PRN
Status: DISCONTINUED | OUTPATIENT
Start: 2024-05-30 | End: 2024-05-30 | Stop reason: HOSPADM

## 2024-05-30 RX ORDER — PROPOFOL 10 MG/ML
INJECTION, EMULSION INTRAVENOUS PRN
Status: DISCONTINUED | OUTPATIENT
Start: 2024-05-30 | End: 2024-05-30

## 2024-05-30 RX ORDER — FENTANYL CITRATE 50 UG/ML
INJECTION, SOLUTION INTRAMUSCULAR; INTRAVENOUS PRN
Status: DISCONTINUED | OUTPATIENT
Start: 2024-05-30 | End: 2024-05-30

## 2024-05-30 RX ORDER — HEPARIN SODIUM 5000 [USP'U]/.5ML
5000 INJECTION, SOLUTION INTRAVENOUS; SUBCUTANEOUS
Status: COMPLETED | OUTPATIENT
Start: 2024-05-30 | End: 2024-05-30

## 2024-05-30 RX ADMIN — LIDOCAINE HYDROCHLORIDE 100 MG: 20 INJECTION, SOLUTION INFILTRATION; PERINEURAL at 12:18

## 2024-05-30 RX ADMIN — MAGNESIUM SULFATE HEPTAHYDRATE 2 G: 40 INJECTION, SOLUTION INTRAVENOUS at 12:28

## 2024-05-30 RX ADMIN — ROCURONIUM BROMIDE 50 MG: 50 INJECTION, SOLUTION INTRAVENOUS at 12:55

## 2024-05-30 RX ADMIN — FENTANYL CITRATE 25 MCG: 50 INJECTION INTRAMUSCULAR; INTRAVENOUS at 14:10

## 2024-05-30 RX ADMIN — KETAMINE HYDROCHLORIDE 50 MG: 10 INJECTION INTRAMUSCULAR; INTRAVENOUS at 12:37

## 2024-05-30 RX ADMIN — HEPARIN SODIUM 5000 UNITS: 10000 INJECTION, SOLUTION INTRAVENOUS; SUBCUTANEOUS at 12:16

## 2024-05-30 RX ADMIN — ROCURONIUM BROMIDE 50 MG: 50 INJECTION, SOLUTION INTRAVENOUS at 12:18

## 2024-05-30 RX ADMIN — Medication 5 MG: at 13:32

## 2024-05-30 RX ADMIN — GABAPENTIN 100 MG: 100 CAPSULE ORAL at 11:50

## 2024-05-30 RX ADMIN — SODIUM CHLORIDE, POTASSIUM CHLORIDE, SODIUM LACTATE AND CALCIUM CHLORIDE: 600; 310; 30; 20 INJECTION, SOLUTION INTRAVENOUS at 13:38

## 2024-05-30 RX ADMIN — SUGAMMADEX 200 MG: 100 INJECTION, SOLUTION INTRAVENOUS at 13:34

## 2024-05-30 RX ADMIN — PROPOFOL 200 MCG/KG/MIN: 10 INJECTION, EMULSION INTRAVENOUS at 12:18

## 2024-05-30 RX ADMIN — Medication 5 MG: at 13:14

## 2024-05-30 RX ADMIN — ACETAMINOPHEN 975 MG: 325 TABLET, FILM COATED ORAL at 11:50

## 2024-05-30 RX ADMIN — Medication 2 G: at 12:14

## 2024-05-30 RX ADMIN — ONDANSETRON 4 MG: 2 INJECTION INTRAMUSCULAR; INTRAVENOUS at 13:28

## 2024-05-30 RX ADMIN — SODIUM CHLORIDE, POTASSIUM CHLORIDE, SODIUM LACTATE AND CALCIUM CHLORIDE: 600; 310; 30; 20 INJECTION, SOLUTION INTRAVENOUS at 11:50

## 2024-05-30 RX ADMIN — KETOROLAC TROMETHAMINE 15 MG: 30 INJECTION, SOLUTION INTRAMUSCULAR at 13:29

## 2024-05-30 RX ADMIN — MEPERIDINE HYDROCHLORIDE 25 MG: 25 INJECTION, SOLUTION INTRAMUSCULAR; INTRAVENOUS; SUBCUTANEOUS at 12:48

## 2024-05-30 RX ADMIN — DEXAMETHASONE SODIUM PHOSPHATE 4 MG: 4 INJECTION, SOLUTION INTRA-ARTICULAR; INTRALESIONAL; INTRAMUSCULAR; INTRAVENOUS; SOFT TISSUE at 12:25

## 2024-05-30 RX ADMIN — PHENYLEPHRINE HYDROCHLORIDE 100 MCG: 10 INJECTION INTRAVENOUS at 12:25

## 2024-05-30 RX ADMIN — FENTANYL CITRATE 100 MCG: 50 INJECTION INTRAMUSCULAR; INTRAVENOUS at 12:17

## 2024-05-30 RX ADMIN — PROPOFOL 200 MG: 10 INJECTION, EMULSION INTRAVENOUS at 12:18

## 2024-05-30 RX ADMIN — Medication 5 MG: at 13:19

## 2024-05-30 RX ADMIN — LIDOCAINE HYDROCHLORIDE 100 MG: 20 INJECTION, SOLUTION INFILTRATION; PERINEURAL at 12:16

## 2024-05-30 ASSESSMENT — ACTIVITIES OF DAILY LIVING (ADL)
ADLS_ACUITY_SCORE: 31

## 2024-05-30 NOTE — DISCHARGE INSTRUCTIONS
Home Care Following Hernia Repair    [facility] Alex, DO      Hernia Type:  Right Inguinal hernia    It is not uncommon to have urinary retention after an inguinal hernia repair.  This is a condition where your bladder cannot relax therefore you cannot urinate after surgery.  If this continues to persist several hours after surgery, please go to the ER for further evaluation.  Please have the ER placed a Rhodes catheter and leave in the bladder for at least 5 days or until your follow-up.  Pain meds:   600mg ibuprofen every 6hrs prn   650mg of acetaminophen every 6hrs prn  You can alternate these meds so that you take one every 3 hrs.    Make sure you do not go over 4000mg of acetaminophen every 24hrs, especially if you are taking Norco or percocet 5/325mg.    Care of the Incision:  Remove gauze dressing (if present) after 48 hours.  If surgical glue was used, keep your incision dry for 24 hours.  Then you may shower, but don t submerge under water for at least 2 weeks.  Gently pat your incision dry with a freshly laundered towel.  Do not touch your incision with bare hands or pick at scabs.  Leave your incision open to air.  Cover it only if clothing rubs or irritates it.  Remove the gauze and clear tape from the belly button 48 hrs after surgery, if present.   Wear your abdominal binder, if given, while awake for the first 2-4 weeks.   Activity:  Gradually increase your activity.  Walk short distances several times each day and increase the distance as your strength allows.  To promote circulation, do not cross your legs while sitting.  No strenuous lifting or straining for 2-3 weeks.   Do not lift anything over 10-20 pounds until your doctor approves an increase.  Return to work will be determined by the type of work you do and should be discussed with your physician.  Do not drive or operate equipment while taking prescription pain medicines.  You may drive 1 week after surgery if you have stopped taking  prescription pain medicines and are pain-free enough to react quickly and make an emergency stop if necessary.    Diet:  Return to the diet you were on before surgery.  Drink plenty of  water.  Avoid foods that cause constipation.    REMEMBER--most prescription pain pills cause constipation.  Walking, extra fluids, and increased fiber (fresh fruits and vegetables, etc.) are natural remedies for constipation.  You can also take mineral oil, 1-2 Tablespoons per day.  If still constipated you may try a stool softener such as Colace or Miralax.    Call Your Physician if You Have:  Redness, increased swelling or cloudy drainage from your incision.  A temperature of more than 101 degrees F.  Worsening pain in your incision not relieved by your prescription pain pills and/or a short rest.  Any questions or concerns about your recovery, please call     Business hours ([phone]     After hours [phone] Nurse Advice Line (24 hours a day)    Follow-up Care:  Make an appointment 1-2 week after your surgery.  Call [phone].                       Same Day Surgery Discharge Instructions  Special Precautions After Surgery - Adult    It is not unusual to feel lightheaded or faint, up to 24 hours after surgery or while taking pain medication.  If you have these symptoms; sit for a few minutes before standing and have someone assist you when getting up.  You should rest and relax for the next 24 hours and must have someone stay with you for at least 24 hours after your discharge.  DO NOT DRIVE any vehicle or operate mechanical equipment for 24 hours following the end of your surgery.  DO NOT DRIVE while taking narcotic pain medications that have been prescribed by your physician.  If you had a limb operated on, you must be able to use it fully to drive.  DO NOT drink alcoholic beverages for 24 hours following surgery or while taking prescription pain medication.  Drink clear liquids (apple juice, ginger ale, broth, 7-Up,  etc.).  Progress to your regular diet as you feel able.  Any questions call your physician and do not make important decisions for 24 hours.      __________________________________________________________________________________________________________________________________  IMPORTANT NUMBERS:    [facility] Main Number:  [phone], [phone]  Pharmacy:  [phone]  Same Day Surgery:  [phone], Monday - Friday until 8:30 p.m.  Urgent Care:  [phone]  Emergency Room:  [phone]      [facility]:  [phone]                                                                             [facility]:  [phone] option 1  [facility]:  [phone]     [facility]:  [phone]   [facility]:  [phone]   Home Medical Equipment: [phone]  [facility]:  [phone]

## 2024-05-30 NOTE — INTERVAL H&P NOTE
"I have reviewed the surgical (or preoperative) H&P that is linked to this encounter, and examined the patient. There are no significant changes    Clinical Conditions Present on Arrival:  Clinically Significant Risk Factors Present on Admission                 # Drug Induced Platelet Defect: home medication list includes an antiplatelet medication  # Overweight: Estimated body mass index is 25.84 kg/m  as calculated from the following:    Height as of this encounter: 1.702 m (5' 7\").    Weight as of this encounter: 74.8 kg (165 lb).       "

## 2024-05-30 NOTE — OP NOTE
[facility]  Operative Note    Pre-operative diagnosis: Right non-recurrent unilateral inguinal hernia without obstruction or gangrene [K40.90]   Post-operative diagnosis same   Procedure: Procedure(s):  RIGHT HERNIORRHAPHY, INGUINAL, ROBOT-ASSISTED, LAPAROSCOPIC, USING DA ANNA XI   Surgeon: Bryan Alex MD   Assistants(s): Single scrub tech     Anesthesia: General    Estimated blood loss: 2cc                Drains: None   Specimens: None   Implants: [id number] Dextile. Right    Findings: Indirect right inguinal hernia with cord lipoma, no left inguinal hernia .   Complications: None.   Condition: Stable       Indications for the procedure:   This is a 69M with right inguinal hernia.  Patient is symptomatic and would like it to be repaired.  We discussed risks, benefits, and alternatives of the procedure during the office visit.  Patient agreed and wishes to proceed with the procedures above.    Description of procedure:  The patient was preoperatively identified. Consent was signed and placed on the chart. She/he was brought back to the operative suite where he was laid supine on the operating table. General endotracheal anesthesia was induced per anesthesia protocol. She/he was then prepped and draped in sterile fashion. We started by infiltrating 5 cc of local anesthetic in the right upper quadrant area and made small skin incision and accessing the abdominal cavity by using Optiview trocar and 5-mm trocar site visualizing all layers of the abdominal wall until we reached the peritoneal cavity. We then insufflated  with CO2 gas to create pneumoperitoneum.  Brief glance of the abdomen noted only right inguinal hernia.  An 8mm robotic trocar was then placed at the RUQ.  An 8mm robotic camera port was also placed just lateral to the midline supraumbilical. A final 8 mm robotic trocar on the left upper quadrant in place of the 5mm port.  All these were placed under direct visualization and  also after local anesthetic to the skin.  Patient was then placed in Trendelenburg position until was able to see the hernia adequately.  The [place] da [place] robot was then docked.    I start my dissection on the right.  The previously marked iliac crests was then palpated by my first assist.  Began taking down the peritoneum lateral to medial utilizing the monopolar scissors and Cadiere forceps; started approximately 6 cm above the area of the inguinal hernia.  Dissected this bluntly and also with monopolar until I saw the pubic symphysis and Michael's ligament medially.  I was cognizant of the bladder just below the pubic symphysis.  Once I was 1 to 2 cm inferior to the pubic symphysis, and continued the dissection more laterally.  Made my lateral space until the level of the iliac crest.  I then reduce the peritoneum from the hernia.  The peritoneum was reduced until I am able to see the iliac vessels and the psoas muscle. I checked for any cord lipoma and this was reduced.  I then have a full view of my myopectineal space -noted there was right indirect hernia.      I then place a right [id number] Dextile mesh within the space made on the right inguinal area.  I sutured the medial aspect of the mesh onto Michael's ligament utilizing a 2-0 Vicryl.  I also tacked the lateral aspect of the mesh far away from the nerves utilizing a 2-0 Vicryl.      At this point I close the peritoneal flap utilizing a 2-0V lock to complete my procedure.  Any rents made in the peritoneum was closed with a 3-0 Vicryl in a figure-of-eight fashion.  The hernia sac was also tacked against the peritoneum flap.     At this point brief glance of the abdomen was noted.  Noted hemostasis was achieved.  Then removed all needles through the 8 mm robotic arm under direct visualization.  And the robot was then undocked.     All skin incisions were closed with 4-0 Monocryl in a simple interrupted buried fashion.  Area was then cleaned and dried.  Skin glue  then applied.      Patient tolerated the procedure well.  All instruments, needles, lap counts were correct at the end of the case x2.      [facility] Alex, DO, FACOS

## 2024-05-30 NOTE — ANESTHESIA CARE TRANSFER NOTE
Patient: Dillon Matias    Procedure: Procedure(s):  RIGHT HERNIORRHAPHY, INGUINAL, ROBOT-ASSISTED, LAPAROSCOPIC, USING DA ANNA XI       Diagnosis: Non-recurrent unilateral inguinal hernia without obstruction or gangrene [K40.90]  Diagnosis Additional Information: No value filed.    Anesthesia Type:   General     Note:    Oropharynx: oral airway in place and spontaneously breathing  Level of Consciousness: drowsy  Oxygen Supplementation: face mask  Level of Supplemental Oxygen (L/min / FiO2): 8  Independent Airway: airway patency not satisfactory and stable  Dentition: dentition unchanged  Vital Signs Stable: post-procedure vital signs reviewed and stable  Report to RN Given: handoff report given  Patient transferred to: PACU    Handoff Report: Identifed the Patient, Identified the Reponsible Provider, Reviewed the pertinent medical history, Discussed the surgical course, Reviewed Intra-OP anesthesia mangement and issues during anesthesia, Set expectations for post-procedure period and Allowed opportunity for questions and acknowledgement of understanding      Vitals:  Vitals Value Taken Time   BP     Temp 35.5  C (95.9  F) 05/30/24 1352   Pulse     Resp     SpO2     Vitals shown include unfiled device data.    Electronically Signed By: Za Rodgers RN  May 30, 2024  1:53 PM

## 2024-05-30 NOTE — ANESTHESIA PROCEDURE NOTES
Airway       Patient location during procedure: OR       Procedure Start/Stop Times: 5/30/2024 12:20 PM  Staff -        CRNA: Ezekiel Bynum APRN CRNA       Other Anesthesia Staff: Za Rodgers RN       Performed By: CRNA  Consent for Airway        Urgency: elective  Indications and Patient Condition       Indications for airway management: gerson-procedural       Induction type:intravenous       Mask difficulty assessment: 1 - vent by mask    Final Airway Details       Final airway type: endotracheal airway       Successful airway: ETT - single  Endotracheal Airway Details        ETT size (mm): 7.5       Cuffed: yes       Successful intubation technique: video laryngoscopy       VL Blade Size: Davis 4       Grade View of Cords: 1       Adjucts: stylet       Position: Right       Measured from: gums/teeth       Secured at (cm): 24       Bite block used: None    Post intubation assessment        Placement verified by: capnometry, equal breath sounds and chest rise        Number of attempts at approach: 1       Number of other approaches attempted: 0       Secured with: tape       Ease of procedure: easy       Dentition: Intact and Unchanged    Medication(s) Administered   Medication Administration Time: 5/30/2024 12:20 PM

## 2024-05-30 NOTE — ANESTHESIA POSTPROCEDURE EVALUATION
Patient: Dillon Matias    Procedure: Procedure(s):  RIGHT HERNIORRHAPHY, INGUINAL, ROBOT-ASSISTED, LAPAROSCOPIC, USING DA ANNA XI       Anesthesia Type:  General    Note:  Disposition: Outpatient   Postop Pain Control: Uneventful            Sign Out: Well controlled pain   PONV: No   Neuro/Psych: Uneventful            Sign Out: Acceptable/Baseline neuro status   Airway/Respiratory: Uneventful            Sign Out: Acceptable/Baseline resp. status   CV/Hemodynamics: Uneventful            Sign Out: Acceptable CV status; No obvious hypovolemia; No obvious fluid overload   Other NRE: NONE   DID A NON-ROUTINE EVENT OCCUR? No           Last vitals:  Vitals Value Taken Time   BP 98/50 05/30/24 1430   Temp 35.4  C (95.7  F) 05/30/24 1430   Pulse 54 05/30/24 1440   Resp 14 05/30/24 1440   SpO2 96 % 05/30/24 1440   Vitals shown include unfiled device data.    Electronically Signed By: SRIRAM Lawson CRNA  May 30, 2024  2:41 PM

## 2024-06-06 ENCOUNTER — LAB (OUTPATIENT)
Dept: LAB | Facility: CLINIC | Age: 69
End: 2024-06-06
Payer: COMMERCIAL

## 2024-06-06 DIAGNOSIS — C61 PROSTATE CANCER (H): ICD-10-CM

## 2024-06-06 LAB — PSA SERPL DL<=0.01 NG/ML-MCNC: 1.81 NG/ML (ref 0–6.5)

## 2024-06-06 PROCEDURE — 84153 ASSAY OF PSA TOTAL: CPT

## 2024-06-06 PROCEDURE — 36415 COLL VENOUS BLD VENIPUNCTURE: CPT

## 2024-06-10 ENCOUNTER — VIRTUAL VISIT (OUTPATIENT)
Dept: RADIATION THERAPY | Facility: OUTPATIENT CENTER | Age: 69
End: 2024-06-10
Payer: COMMERCIAL

## 2024-06-10 DIAGNOSIS — C61 PROSTATE CANCER (H): Primary | ICD-10-CM

## 2024-06-10 NOTE — LETTER
6/10/2024      Dillon Matias  [address] [address] [address]      Dear Colleague,    Thank you for referring your patient, Dillon Matias, to the [facility]. Please see a copy of my visit note below.    Radiation Oncology Note    HPI:Mr. Matias is a 69 year old male with a diagnosis of  adenocarcinoma of the prostate, Jovani score 3+4 = 7, PSA = 6.26, clinical T1 cN0 M0.  He underwent definitive radiation therapy       Radiation Treatment  9/26/23 to 11/2/23:  Prostate, 7000 cGy  in 28 fractions    Patient returns for follow-up.    PSA on 6/6/24 was down up from prior value, current value 1.81 prior value 6.09.  Pre-treatment PSA in January 2023 was 6.26.    Patient is overall doing well.  No acute complaints.  No  bother.  No GI bother.  No blood in stool.  No blood in urine.  Energy is good.    There were no vitals taken for this visit.  NAD      Plan:  Appropriate recovery post completion of radiation treatment.    PSA is down trending.    RTC in 6 months with PSA. Will see PA.    Due to the concerns around COVID-19 and adhering to social distancing we conduct this visit over the telephone. Telephone call lasted 20 minutes.         Jaren Ohara M.D.  Department of Radiation Oncology  [facility]

## 2024-06-10 NOTE — NURSING NOTE
"Dillon is a 69 year old who is being evaluated via a billable telephone visit.    What phone number would you like to be contacted at? cell  How would you like to obtain your AVS? MyChart  Originating Location (pt. Location): Home    Distant Location (provider location):  On-site  Phone call duration: 10 minutes   Oncology Rooming Note    Aubree 10, 2024 11:31 AM   Dillon Matias is a 69 year old male who presents for:    Chief Complaint   Patient presents with    Radiation Therapy     Follow up telephone visit with Dr. Ohara/prostate cancer     Initial Vitals: There were no vitals taken for this visit. Estimated body mass index is 25.84 kg/m  as calculated from the following:    Height as of 5/30/24: 1.702 m (5' 7\").    Weight as of 5/30/24: 74.8 kg (165 lb). There is no height or weight on file to calculate BSA.  Data Unavailable Comment: Data Unavailable   No LMP for male patient.  Allergies reviewed: Yes  Medications reviewed: Yes    Medications: Medication refills not needed today.  Pharmacy name entered into [facility]:    [id number] - [place], [place] - [address]  [facility] - [place], [place] - [address]    Frailty Screening:   Is the patient here for a new oncology consult visit in cancer care? 2. No      Clinical concerns: routine phone follow up with Dr. Ohara   Patient doing well, no new concerns, no pain, good appetite/good energy  No bowel issues  No bladder issues/nocturia x 1       Namrata Medina RN              "

## 2024-06-10 NOTE — PROGRESS NOTES
Radiation Oncology Note    HPI:Mr. Matias is a 69 year old male with a diagnosis of  adenocarcinoma of the prostate, Jovani score 3+4 = 7, PSA = 6.26, clinical T1 cN0 M0.  He underwent definitive radiation therapy       Radiation Treatment  9/26/23 to 11/2/23:  Prostate, 7000 cGy  in 28 fractions    Patient returns for follow-up.    PSA on 6/6/24 was down up from prior value, current value 1.81 prior value 6.09.  Pre-treatment PSA in January 2023 was 6.26.    Patient is overall doing well.  No acute complaints.  No  bother.  No GI bother.  No blood in stool.  No blood in urine.  Energy is good.    There were no vitals taken for this visit.  NAD      Plan:  Appropriate recovery post completion of radiation treatment.    PSA is down trending.    RTC in 6 months with PSA. Will see PA.    Due to the concerns around COVID-19 and adhering to social distancing we conduct this visit over the telephone. Telephone call lasted 20 minutes.         Jaren Ohara M.D.  Department of Radiation Oncology  [facility]

## 2024-06-10 NOTE — Clinical Note
Please schedule 6 month return with Karina phone or in person. Will need PSA lab a day or two prior to Return visit please.

## 2024-06-11 ENCOUNTER — OFFICE VISIT (OUTPATIENT)
Dept: SURGERY | Facility: CLINIC | Age: 69
End: 2024-06-11
Payer: COMMERCIAL

## 2024-06-11 VITALS
WEIGHT: 165 LBS | RESPIRATION RATE: 14 BRPM | HEART RATE: 70 BPM | SYSTOLIC BLOOD PRESSURE: 131 MMHG | OXYGEN SATURATION: 96 % | HEIGHT: 67 IN | TEMPERATURE: 97.1 F | DIASTOLIC BLOOD PRESSURE: 77 MMHG | BODY MASS INDEX: 25.9 KG/M2

## 2024-06-11 DIAGNOSIS — Z87.19 S/P RIGHT INGUINAL HERNIA REPAIR: Primary | ICD-10-CM

## 2024-06-11 DIAGNOSIS — Z98.890 S/P RIGHT INGUINAL HERNIA REPAIR: Primary | ICD-10-CM

## 2024-06-11 PROCEDURE — 99024 POSTOP FOLLOW-UP VISIT: CPT | Performed by: PHYSICIAN ASSISTANT

## 2024-06-11 ASSESSMENT — PAIN SCALES - GENERAL: PAINLEVEL: NO PAIN (0)

## 2024-06-11 NOTE — LETTER
"6/11/2024      Dillon Matias  [address] [facility] [id number]      Dear Colleague,    Thank you for referring your patient, Dillon Matias, to the [facility]. Please see a copy of my visit note below.    Surgery Post-op Note  [facility]  [address] [place] [address]  T: [phone]  F: [phone]    Subjective:     Dillon Matias presents to the clinic after undergoing robotic hernia repair of right inguinal hernia on 5/30/2024 with Dr. Alex.  Patient is here for follow up. The patient reports no incisional pain.  Patient is currently tolerating regular diet.  Patient states bowel habits are normal and denies significant nausea or vomiting. He is feel much better after his surgery.  He is having no bulge. No issues. All his previous symptoms have resolved. He wants to return to lifting normally. He is having no issues. He is very happy.         Objective:     Vitals:   /77 (BP Location: Right arm, Patient Position: Sitting, Cuff Size: Adult Regular)   Pulse 70   Temp 97.1  F (36.2  C) (Tympanic)   Resp 14   Ht 1.702 m (5' 7\")   Wt 74.8 kg (165 lb)   SpO2 96%   BMI 25.84 kg/m     General Appearance:    Dillon is a well-appearing 69 year old  male who is in no acute distress.   Cardiac:    Skin is warm and dry    Pulmonary:   Nonlabored breathing on room air   Abdomen:    Soft, nontender, non distended. No inguinal bulge. Incisions are healing well without swelling, bruising or any issues.    Incision: The incision site is healing very well. There are no signs of cellulitis or drainage.        Labs/Imaging:     No new labs or imaging       Pathology:     No pathology      Assessment:     Mr.Kevin DAVID Matias is status post robotic incisional hernia repair, doing well .        Plan:     1. The patient is instructed to call the office if there is any increasing incisional pain, swelling, redness, drainage, or any other problems.   2. " Recommend modification of lifting until 6 weeks to allow incorporation of mesh.   3. Follow up in clinic as needed  4. Contact clinic if any questions, concerns or new or worsening symptoms. All questions answered. Patient understands plans and is happy with visit.        Again, thank you for allowing me to participate in the care of your patient.        Sincerely,        DOUGLAS RANGEL PA-C

## 2024-06-11 NOTE — NURSING NOTE
"Chief Complaint   Patient presents with    Post-op Visit     Post op DOS 5/30       Vitals:    06/11/24 0924   BP: 131/77   BP Location: Right arm   Patient Position: Sitting   Cuff Size: Adult Regular   Pulse: 70   Resp: 14   Temp: 97.1  F (36.2  C)   TempSrc: Tympanic   SpO2: 96%   Weight: 74.8 kg (165 lb)   Height: 1.702 m (5' 7\")     Wt Readings from Last 1 Encounters:   06/11/24 74.8 kg (165 lb)       Namrata WHITNEY CMA.................6/11/2024    "

## 2024-06-12 ENCOUNTER — OFFICE VISIT (OUTPATIENT)
Dept: CARDIOLOGY | Facility: CLINIC | Age: 69
End: 2024-06-12
Payer: COMMERCIAL

## 2024-06-12 VITALS
HEART RATE: 62 BPM | WEIGHT: 169 LBS | HEIGHT: 67 IN | BODY MASS INDEX: 26.53 KG/M2 | SYSTOLIC BLOOD PRESSURE: 139 MMHG | DIASTOLIC BLOOD PRESSURE: 77 MMHG | RESPIRATION RATE: 16 BRPM | OXYGEN SATURATION: 98 %

## 2024-06-12 DIAGNOSIS — I25.10 CORONARY ARTERY DISEASE INVOLVING NATIVE CORONARY ARTERY OF NATIVE HEART WITHOUT ANGINA PECTORIS: ICD-10-CM

## 2024-06-12 PROCEDURE — 99214 OFFICE O/P EST MOD 30 MIN: CPT | Mod: 24 | Performed by: INTERNAL MEDICINE

## 2024-06-12 NOTE — PROGRESS NOTES
Cardiology Consultation       Assessment & Plan     1.  History of inferior MI 2015 with treatment at [facility] with single-vessel occlusive coronary artery disease.  Underwent emergent PCI to his RCA with drug-eluting stent x1.  Remainder of coronaries with mild nonocclusive disease.  Patient had cardiogenic shock and balloon pump and escalation to Impella device was performed  2.  Former smoker  3.  Hyperlipidemia on statin therapy  4.  Normal stress test in 2019 at outside hospital  5.  Coronary calcification seen on screening CT scan of chest      Recommendations    1.  Atherosclerotic coronary artery disease: He is on appropriate guideline directed medical therapy.  Let us continue with his current medical therapy.  He has no active symptoms at present.  Echocardiogram was reviewed and he has preserved LV systolic function with no wall motion abnormalities.  He had a nuclear myocardial perfusion study with no evidence of ischemia or infarct.  Clinically no symptoms    2.  Coronary calcifications: Patient has had a thorough work-up in the past and has no active cardiac symptoms.  He is on appropriate guideline directed medical therapy and his LDL is at goal.  Let us continue with his current therapy.  Continue with his aspirin and statin.  He is not on a beta-blocker due to being relatively bradycardic and his home blood pressure demonstrate that he is normotensive on no medical therapy.    3.  He is recovering well from his abdominal hernia surgery.    4.  Return to clinic in 1 years time with pre-clinic lab work      Agustina Nunez MD, MD        HPI:      Patient is a pleasant 69-year-old gentleman with the above-mentioned cardiac history.  He was in his usual health up until 2015 where he had acute onset of chest pain leading to an emergent evaluation at [facility].  Per review of notes single-vessel occlusive coronary artery disease was noted and he underwent emergent PCI to his RCA with  drug-eluting stent x1.  Remainder of his coronaries had mild nonocclusive disease he had cardiogenic shock post procedure which was mitigated by the placement of an intra-aortic balloon pump and an implant Impella device for a short period of time.  He has suffered no additional cardiovascular sequelae.  His EKG performed today demonstrates normal sinus rhythm with no evidence of a prior infarct.  He due to a longstanding history of smoking has undergone CT scans of his chest to look for nodules.  They also for the last 2 times have commented on severe coronary calcifications.  He is sent for evaluation and establishment for local cardiac care.  He recently retired and states that he is enjoying his [employer].  He performs activities of daily living and has never had any repeat symptoms since his initial presentation in 2015.  Here for a follow-up      Echo 2024  1. The left ventricle is normal in size. Proximal septal thickening is noted.  Left ventricular systolic function is normal. The visual ejection fraction is  55-60%. Diastolic Doppler findings (E/E' ratio and/or other parameters)  suggest left ventricular filling pressures are normal. No regional wall motion  abnormalities noted.  2. The right ventricle is normal size. The right ventricular systolic function  is normal.  3. Mild mitral and tricuspid regurgitation.  4. Aortic root dilatation is present.  5. No pericardial effusion.  6. No previous study for comparison.      MPI 2024      The nuclear stress test is negative for inducible myocardial ischemia or infarction.    Left ventricular function is normal.    The patient's exercise capacity is and stress EKG is normal.    There is no prior study for comparison.                Primary Care Physician   Antonio Hernandez    Patient Active Problem List   Diagnosis    Coronary artery disease involving native coronary artery of native heart without angina pectoris    Dyslipidemia    Actinic keratosis     Chronic left shoulder pain    History of tobacco abuse    Other eczema       Past Medical History   I have reviewed this patient's medical history and updated it with pertinent information if needed.   Past Medical History:   Diagnosis Date    Actinic keratosis     Basal cell carcinoma     Heart disease 07/2015       Past Surgical History   I have reviewed this patient's surgical history and updated it with pertinent information if needed.  Past Surgical History:   Procedure Laterality Date    COLONOSCOPY      DAVINCI XI HERNIORRHAPHY INGUINAL Right 5/30/2024    Procedure: RIGHT HERNIORRHAPHY, INGUINAL, ROBOT-ASSISTED, LAPAROSCOPIC, USING DA ANNA XI;  Surgeon: Bryan Alex MD;  Location: [facility] OR    INSERT SEED MARKER / MATRIX N/A 9/8/2023    Procedure: Transrectal Ultrasound Guided Placement of Fiducials and SpaceOar;  Surgeon: Michael Almendarez MD;  Location:  OR    ORTHOPEDIC SURGERY  08/2010    RIGHT KNEE       Prior to Admission Medications   Cannot display prior to admission medications because the patient has not been admitted in this contact.     [unfilled]  [unfilled]  Allergies   No Known Allergies    Social History    reports that he quit smoking about 8 years ago. His smoking use included cigarettes. He started smoking about 54 years ago. He has a 45.1 pack-year smoking history. He has been exposed to tobacco smoke. He has never used smokeless tobacco. He reports that he does not currently use alcohol. He reports that he does not currently use drugs.    Family History   Family History   Problem Relation Age of Onset    Anesthesia Reaction No family hx of     Venous thrombosis No family hx of        Review of Systems   The comprehensive 10 point Review of Systems is negative other than noted in the HPI or here.     Physical Exam   Vital Signs with Ranges     Wt Readings from Last 4 Encounters:   06/11/24 74.8 kg (165 lb)   05/30/24 74.8 kg (165 lb)   05/13/24 76.7 kg (169 lb)   04/26/24  76.7 kg (169 lb)     [unfilled]      Vitals: There were no vitals taken for this visit.    GENERAL: Healthy, alert and no distress  EYES: Eyes grossly normal to inspection.  No discharge or erythema, or obvious scleral/conjunctival abnormalities.  RESP: No audible wheeze, cough, or visible cyanosis.  No visible retractions or increased work of breathing.    CV:  RRR  Lungs:  CTA B  Abdomen: + BS soft NT/ND  SKIN: Visible skin clear. No significant rash, abnormal pigmentation or lesions.  NEURO: Cranial nerves grossly intact.  Mentation and speech appropriate for age.  PSYCH: Mentation appears normal, affect normal/bright, judgement and insight intact, normal speech and appearance well-groomed.

## 2024-06-12 NOTE — LETTER
6/12/2024    Antonio Hernandez PA-C  [address] [place] [id number]    RE: Dillon Matias       Dear Colleague,     I had the pleasure of seeing Dillon Matias in the [facility].    Cardiology Consultation       Assessment & Plan    1.  History of inferior MI 2015 with treatment at [facility] with single-vessel occlusive coronary artery disease.  Underwent emergent PCI to his RCA with drug-eluting stent x1.  Remainder of coronaries with mild nonocclusive disease.  Patient had cardiogenic shock and balloon pump and escalation to Impella device was performed  2.  Former smoker  3.  Hyperlipidemia on statin therapy  4.  Normal stress test in 2019 at outside hospital  5.  Coronary calcification seen on screening CT scan of chest      Recommendations    1.  Atherosclerotic coronary artery disease: He is on appropriate guideline directed medical therapy.  Let us continue with his current medical therapy.  He has no active symptoms at present.  Echocardiogram was reviewed and he has preserved LV systolic function with no wall motion abnormalities.  He had a nuclear myocardial perfusion study with no evidence of ischemia or infarct.  Clinically no symptoms    2.  Coronary calcifications: Patient has had a thorough work-up in the past and has no active cardiac symptoms.  He is on appropriate guideline directed medical therapy and his LDL is at goal.  Let us continue with his current therapy.  Continue with his aspirin and statin.  He is not on a beta-blocker due to being relatively bradycardic and his home blood pressure demonstrate that he is normotensive on no medical therapy.    3.  He is recovering well from his abdominal hernia surgery.    4.  Return to clinic in 1 years time with pre-clinic lab work      Agustina Nunez MD, MD        HPI:      Patient is a pleasant 69-year-old gentleman with the above-mentioned cardiac history.  He was in his usual health up until 2015 where he had  acute onset of chest pain leading to an emergent evaluation at [facility].  Per review of notes single-vessel occlusive coronary artery disease was noted and he underwent emergent PCI to his RCA with drug-eluting stent x1.  Remainder of his coronaries had mild nonocclusive disease he had cardiogenic shock post procedure which was mitigated by the placement of an intra-aortic balloon pump and an implant Impella device for a short period of time.  He has suffered no additional cardiovascular sequelae.  His EKG performed today demonstrates normal sinus rhythm with no evidence of a prior infarct.  He due to a longstanding history of smoking has undergone CT scans of his chest to look for nodules.  They also for the last 2 times have commented on severe coronary calcifications.  He is sent for evaluation and establishment for local cardiac care.  He recently retired and states that he is enjoying his shelter.  He performs activities of daily living and has never had any repeat symptoms since his initial presentation in 2015.  Here for a follow-up      Echo 2024  1. The left ventricle is normal in size. Proximal septal thickening is noted.  Left ventricular systolic function is normal. The visual ejection fraction is  55-60%. Diastolic Doppler findings (E/E' ratio and/or other parameters)  suggest left ventricular filling pressures are normal. No regional wall motion  abnormalities noted.  2. The right ventricle is normal size. The right ventricular systolic function  is normal.  3. Mild mitral and tricuspid regurgitation.  4. Aortic root dilatation is present.  5. No pericardial effusion.  6. No previous study for comparison.      MPI 2024      The nuclear stress test is negative for inducible myocardial ischemia or infarction.    Left ventricular function is normal.    The patient's exercise capacity is and stress EKG is normal.    There is no prior study for comparison.                Primary Care  Physician  Antonio Hernandez    Patient Active Problem List   Diagnosis    Coronary artery disease involving native coronary artery of native heart without angina pectoris    Dyslipidemia    Actinic keratosis    Chronic left shoulder pain    History of tobacco abuse    Other eczema       Past Medical History  I have reviewed this patient's medical history and updated it with pertinent information if needed.   Past Medical History:   Diagnosis Date    Actinic keratosis     Basal cell carcinoma     Heart disease 07/2015       Past Surgical History  I have reviewed this patient's surgical history and updated it with pertinent information if needed.  Past Surgical History:   Procedure Laterality Date    COLONOSCOPY      DAVINCI XI HERNIORRHAPHY INGUINAL Right 5/30/2024    Procedure: RIGHT HERNIORRHAPHY, INGUINAL, ROBOT-ASSISTED, LAPAROSCOPIC, USING DA NANA XI;  Surgeon: Bryan Alex MD;  Location: [facility] OR    INSERT SEED MARKER / MATRIX N/A 9/8/2023    Procedure: Transrectal Ultrasound Guided Placement of Fiducials and SpaceOar;  Surgeon: Michael Almendarez MD;  Location:  OR    ORTHOPEDIC SURGERY  08/2010    RIGHT KNEE       Prior to Admission Medications  Cannot display prior to admission medications because the patient has not been admitted in this contact.     [unfilled]  [unfilled]  Allergies  No Known Allergies    Social History   reports that he quit smoking about 8 years ago. His smoking use included cigarettes. He started smoking about 54 years ago. He has a 45.1 pack-year smoking history. He has been exposed to tobacco smoke. He has never used smokeless tobacco. He reports that he does not currently use alcohol. He reports that he does not currently use drugs.    Family History  Family History   Problem Relation Age of Onset    Anesthesia Reaction No family hx of     Venous thrombosis No family hx of        Review of Systems  The comprehensive 10 point Review of Systems is negative other than  noted in the HPI or here.     Physical Exam  Vital Signs with Ranges     Wt Readings from Last 4 Encounters:   06/11/24 74.8 kg (165 lb)   05/30/24 74.8 kg (165 lb)   05/13/24 76.7 kg (169 lb)   04/26/24 76.7 kg (169 lb)     [unfilled]      Vitals: There were no vitals taken for this visit.    GENERAL: Healthy, alert and no distress  EYES: Eyes grossly normal to inspection.  No discharge or erythema, or obvious scleral/conjunctival abnormalities.  RESP: No audible wheeze, cough, or visible cyanosis.  No visible retractions or increased work of breathing.    CV:  RRR  Lungs:  CTA B  Abdomen: + BS soft NT/ND  SKIN: Visible skin clear. No significant rash, abnormal pigmentation or lesions.  NEURO: Cranial nerves grossly intact.  Mentation and speech appropriate for age.  PSYCH: Mentation appears normal, affect normal/bright, judgement and insight intact, normal speech and appearance well-groomed.    Thank you for allowing me to participate in the care of your patient.      Sincerely,     Agustina Nunez MD     [facility]  cc:   Agustina Nunez MD  [address] [facility] [address] [place] [id number]

## 2024-06-18 NOTE — PROGRESS NOTES
"Surgery Post-op Note  [facility]  [address] [place] [address]  T: [phone]  F: [phone]    Subjective:     Dillon Matias presents to the clinic after undergoing robotic hernia repair of right inguinal hernia on 5/30/2024 with Dr. Alex.  Patient is here for follow up. The patient reports no incisional pain.  Patient is currently tolerating regular diet.  Patient states bowel habits are normal and denies significant nausea or vomiting. He is feel much better after his surgery.  He is having no bulge. No issues. All his previous symptoms have resolved. He wants to return to lifting normally. He is having no issues. He is very happy.         Objective:     Vitals:   /77 (BP Location: Right arm, Patient Position: Sitting, Cuff Size: Adult Regular)   Pulse 70   Temp 97.1  F (36.2  C) (Tympanic)   Resp 14   Ht 1.702 m (5' 7\")   Wt 74.8 kg (165 lb)   SpO2 96%   BMI 25.84 kg/m     General Appearance:    Dillon is a well-appearing 69 year old  male who is in no acute distress.   Cardiac:    Skin is warm and dry    Pulmonary:   Nonlabored breathing on room air   Abdomen:    Soft, nontender, non distended. No inguinal bulge. Incisions are healing well without swelling, bruising or any issues.    Incision: The incision site is healing very well. There are no signs of cellulitis or drainage.        Labs/Imaging:     No new labs or imaging       Pathology:     No pathology      Assessment:     Mr.Kevin DAVID Matias is status post robotic incisional hernia repair, doing well .        Plan:     1. The patient is instructed to call the office if there is any increasing incisional pain, swelling, redness, drainage, or any other problems.   2. Recommend modification of lifting until 6 weeks to allow incorporation of mesh.   3. Follow up in clinic as needed  4. Contact clinic if any questions, concerns or new or worsening symptoms. All questions answered. Patient understands plans and " is happy with visit.

## 2024-07-02 ENCOUNTER — OFFICE VISIT (OUTPATIENT)
Dept: DERMATOLOGY | Facility: CLINIC | Age: 69
End: 2024-07-02
Payer: COMMERCIAL

## 2024-07-02 ENCOUNTER — TELEPHONE (OUTPATIENT)
Dept: DERMATOLOGY | Facility: CLINIC | Age: 69
End: 2024-07-02

## 2024-07-02 DIAGNOSIS — D23.9 DERMAL NEVUS: ICD-10-CM

## 2024-07-02 DIAGNOSIS — Z85.828 HISTORY OF BASAL CELL CARCINOMA (BCC): ICD-10-CM

## 2024-07-02 DIAGNOSIS — L82.0 INFLAMED SEBORRHEIC KERATOSIS: ICD-10-CM

## 2024-07-02 DIAGNOSIS — Z85.828 HISTORY OF SKIN CANCER: ICD-10-CM

## 2024-07-02 DIAGNOSIS — L82.1 SEBORRHEIC KERATOSES: ICD-10-CM

## 2024-07-02 DIAGNOSIS — L57.0 AK (ACTINIC KERATOSIS): Primary | ICD-10-CM

## 2024-07-02 DIAGNOSIS — D18.01 ANGIOMA OF SKIN: ICD-10-CM

## 2024-07-02 DIAGNOSIS — L81.4 LENTIGO: ICD-10-CM

## 2024-07-02 PROCEDURE — 99213 OFFICE O/P EST LOW 20 MIN: CPT | Mod: 25 | Performed by: DERMATOLOGY

## 2024-07-02 PROCEDURE — 11102 TANGNTL BX SKIN SINGLE LES: CPT | Performed by: DERMATOLOGY

## 2024-07-02 PROCEDURE — 88331 PATH CONSLTJ SURG 1 BLK 1SPC: CPT | Performed by: DERMATOLOGY

## 2024-07-02 PROCEDURE — 17000 DESTRUCT PREMALG LESION: CPT | Mod: 59 | Performed by: DERMATOLOGY

## 2024-07-02 ASSESSMENT — PAIN SCALES - GENERAL: PAINLEVEL: NO PAIN (0)

## 2024-07-02 NOTE — NURSING NOTE
Dillon Matias's chief complaint for this visit includes:  Chief Complaint   Patient presents with    Skin Check     Patient is concerned about growths on arms and left temple.      PCP: Antonio Hernandez    Referring Provider:  Referred Self, MD  No address on file    There were no vitals taken for this visit.  No Pain (0)      No Known Allergies      Do you need any medication refills at today's visit? No    Dominique Gill MA

## 2024-07-02 NOTE — TELEPHONE ENCOUNTER
----- Message from Herrera Paul sent at 7/2/2024 11:54 AM CDT -----  L post auricular scalp inflamed seborrheic keratosis No further treatment

## 2024-07-02 NOTE — PROGRESS NOTES
Dillon Matias is an extremely pleasant 69 year old year old male patient here today for spot onleft cheek.  Patient has no other skin complaints today.  Remainder of the HPI, Meds, PMH, Allergies, FH, and SH was reviewed in chart.      Past Medical History:   Diagnosis Date    Actinic keratosis     Basal cell carcinoma     Heart disease 2015       Past Surgical History:   Procedure Laterality Date    COLONOSCOPY      DAVINCI XI HERNIORRHAPHY INGUINAL Right 2024    Procedure: RIGHT HERNIORRHAPHY, INGUINAL, ROBOT-ASSISTED, LAPAROSCOPIC, USING DA ANNA XI;  Surgeon: Bryan Alex MD;  Location: [facility]    INSERT SEED MARKER / MATRIX N/A 2023    Procedure: Transrectal Ultrasound Guided Placement of Fiducials and SpaceOar;  Surgeon: Mihcael Almendarez MD;  Location: [facility]    ORTHOPEDIC SURGERY  2010    RIGHT KNEE        Family History   Problem Relation Age of Onset    Anesthesia Reaction No family hx of     Venous thrombosis No family hx of        Social History     Socioeconomic History    Marital status:      Spouse name: Not on file    Number of children: Not on file    Years of education: Not on file    Highest education level: Not on file   Occupational History    Not on file   Tobacco Use    Smoking status: Former     Current packs/day: 0.00     Average packs/day: 1 pack/day for 45.1 years (45.1 ttl pk-yrs)     Types: Cigarettes     Start date: 1970     Quit date: 7/15/2015     Years since quittin.9     Passive exposure: Past    Smokeless tobacco: Never   Vaping Use    Vaping status: Never Used   Substance and Sexual Activity    Alcohol use: Not Currently    Drug use: Not Currently    Sexual activity: Not Currently     Partners: Female     Birth control/protection: Post-menopausal   Other Topics Concern    Not on file   Social History Narrative    Not on file     Social Determinants of Health     Financial Resource Strain: Low Risk  (1/10/2024)    Financial Resource Strain      Within the past 12 months, have you or your family members you live with been unable to get utilities (heat, electricity) when it was really needed?: No   Food Insecurity: Low Risk  (1/10/2024)    Food Insecurity     Within the past 12 months, did you worry that your food would run out before you got money to buy more?: No     Within the past 12 months, did the food you bought just not last and you didn t have money to get more?: No   Transportation Needs: Low Risk  (1/10/2024)    Transportation Needs     Within the past 12 months, has lack of transportation kept you from medical appointments, getting your medicines, non-medical meetings or appointments, work, or from getting things that you need?: No   Physical Activity: Not on file   Stress: Not on file   Social Connections: Not on file   Interpersonal Safety: Low Risk  (1/29/2024)    Interpersonal Safety     Do you feel physically and emotionally safe where you currently live?: Yes     Within the past 12 months, have you been hit, slapped, kicked or otherwise physically hurt by someone?: No     Within the past 12 months, have you been humiliated or emotionally abused in other ways by your partner or ex-partner?: No   Housing Stability: Low Risk  (1/10/2024)    Housing Stability     Do you have housing? : Yes     Are you worried about losing your housing?: No       Outpatient Encounter Medications as of 7/2/2024   Medication Sig Dispense Refill    acetaminophen (TYLENOL) 325 MG tablet Take 2 tablets (650 mg) by mouth every 4 hours as needed for mild pain 50 tablet 0    aspirin (ASA) 81 MG chewable tablet Take 1 tablet (81 mg) by mouth daily 90 tablet 3    atorvastatin (LIPITOR) 80 MG tablet Take 1 tablet (80 mg) by mouth every evening 90 tablet 3    Multiple Vitamin (MULTIVITAMIN ADULT PO) Take 1 tablet by mouth every morning      oxyCODONE (ROXICODONE) 5 MG tablet Take 1 tablet (5 mg) by mouth every 6 hours as needed for moderate to severe pain 12 tablet 0     senna-docusate (SENOKOT-S/PERICOLACE) 8.6-50 MG tablet Take 1-2 tablets by mouth 2 times daily 30 tablet 0    triamcinolone (KENALOG) 0.1 % external cream Apply to AA on the leg BID x 1-2 week then PRN only 454 g 2     No facility-administered encounter medications on file as of 7/2/2024.             O:   NAD, WDWN, Alert & Oriented, Mood & Affect wnl, Vitals stable   General appearance normal   Vitals stable   Alert, oriented and in no acute distress     L post auricular scalp 8mm shiny scaly papule   L jawline gritty scaly papule    Stuck on papules and brown macules on trunk and ext   Red papules on trunk  Flesh colored papules on trunk     The remainder of the full exam was normal; the following areas were examined:  conjunctiva/lids, , neck, peripheral vascular system, abdomen, lymph nodes, digits/nails, eccrine and apocrine glands, scalp/hair, face, neck, chest, abdomen, buttocks, back, RUE, LUE, RLE, LLE       Eyes: Conjunctivae/lids:Normal     ENT: Lips, mucosa: normal    MSK:Normal    Cardiovascular: peripheral edema none    Pulm: Breathing Normal    Lymph Nodes: No Head and Neck Lymphadenopathy     Neuro/Psych: Orientation:Alert and Orientedx3 ; Mood/Affect:normal       MICRO:   L PA scalp:Sharply demarcated exophytic epidermal growth composed of sheets of small cells basaloid cells with variable melanin pigmentation.  There are keratin-filled cysts throughout.  The dermis is overall unremarkable with a bland monomorphic inflammatory infiltrate.      A/P:  1. Seborrheic keratosis, lentigo, angioma, dermal nevus. Hx of non-melanoma skin cancer   2. L jawline actinic keratosis   LN2:  Treated with LN2 for 5s for 1-2 cycles. Warned risks of blistering, pain, pigment change, scarring, and incomplete resolution.  Advised patient to return if lesions do not completely resolve.  Wound care sheet given.  3. L PA scalp r/o squamous cell carcinoma   TANGENTIAL BIOPSY IN HOUSE:  After consent, anesthesia with LEC and  prep, tangential excision performed and dx above confirmed with frozen section histology.  No complications and routine wound care.  Patient is not on  anticoagulants and risk of bleeding discussed with patient.       I have personally reviewed all specimens and/or slides and used them with my medical judgement to determine or confirm the final diagnosis.     Patient told result inflamed seborrheic keratosis No further treatment  .      It was a pleasure speaking to Dillon Matias today.  Previous clinic notes and pertinent laboratory tests were reviewed prior to Dillon Matias's visit.  Signs and Symptoms of skin cancer discussed with patient.  Patient encouraged to perform monthly skin exams.  UV precautions reviewed with patient.  Risks of non-melanoma skin cancer discussed with patient   Return to clinic 12 months

## 2024-07-02 NOTE — PATIENT INSTRUCTIONS
WOUND CARE INSTRUCTIONS   FOR CRYOSURGERY   This area treated with liquid nitrogen should form a blister (areas treated may or may not blister-skin may just turn dark and slough off). You do not need to bandage the area unless a blister forms and breaks (which may be a few days). When the blister breaks, begin daily dressing changes as follows:  1) Clean and dry the area with tap water using clean Q-tip or sterile gauze pad.   2) Apply Polysporin ointment or Bacitracin ointment over entire wound. Do NOT use Neosporin ointment.   3) Cover the wound with a band-aid or sterile non-stick gauze pad and micropore paper tape.   REPEAT THESE INSTRUCTIONS AT LEAST ONCE A DAY UNTIL THE WOUND HAS COMPLETELY HEALED.   It is an old wives tale that a wound heals better when it is exposed to air and allowed to dry out. The wound will heal faster with a better cosmetic result if it is kept moist with ointment and covered with a bandage.   Do not let the wound dry out.   IMPORTANT INFORMATION ON REVERSE SIDE   Supplies Needed:   *Cotton tipped applicators (Q-tips)   *Polysporin ointment or Bacitracin ointment (NOT NEOSPORIN)   *Band-aids, or non stick gauze pads and micropore paper tape   PATIENT INFORMATION   During the healing process you will notice a number of changes. All wounds develop a small halo of redness surrounding the wound. This means healing is occurring. Severe itching with extensive redness usually indicates sensitivity to the ointment or bandage tape used to dress the wound. You should call our office if this develops.   Swelling and/or discoloration around your surgical site is common, particularly when performed around the eye.   All wounds normally drain. The larger the wound the more drainage there will be. After 7-10 days, you will notice the wound beginning to shrink and new skin will begin to grow. The wound is healed when you can see skin has formed over the entire area. A healed wound has a healthy, shiny  look to the surface and is red to dark pink in color to normalize. Wounds may take approximately 4-6 weeks to heal. Larger wounds may take 6-8 weeks. After the wound is healed you may discontinue dressing changes.   You may experience a sensation of tightness as your wound heals. This is normal and will gradually subside.   Your healed wound may be sensitive to temperature changes. This sensitivity improves with time, but if you re having a lot of discomfort, try to avoid temperature extremes.   Patients frequently experience itching after their wound appears to have healed because of the continue healing under the skin. Plain Vaseline will help relieve the itching.            Wound Care Instructions     FOR SUPERFICIAL WOUNDS     [facility] [phone]    [facility] [phone]                       AFTER 24 HOURS YOU SHOULD REMOVE THE BANDAGE AND BEGIN DAILY DRESSING CHANGES AS FOLLOWS:     1) Remove Dressing.     2) Clean and dry the area with tap water using a Q-tip or sterile gauze pad.     3) Apply Vaseline, Aquaphor, Polysporin ointment or Bacitracin ointment over entire wound.  Do NOT use Neosporin ointment.     4) Cover the wound with a band-aid, or a sterile non-stick gauze pad and micropore paper tape      REPEAT THESE INSTRUCTIONS AT LEAST ONCE A DAY UNTIL THE WOUND HAS COMPLETELY HEALED.    It is an old wives tale that a wound heals better when it is exposed to air and allowed to dry out. The wound will heal faster with a better cosmetic result if it is kept moist with ointment and covered with a bandage.    **Do not let the wound dry out.**      Supplies Needed:      *Cotton tipped applicators (Q-tips)    *Polysporin Ointment or Bacitracin Ointment (NOT NEOSPORIN)    *Band-aids or non-stick gauze pads and micropore paper tape.      PATIENT INFORMATION:    During the healing process you will notice a number of changes. All wounds develop a small halo of redness surrounding  the wound.  This means healing is occurring. Severe itching with extensive redness usually indicates sensitivity to the ointment or bandage tape used to dress the wound.  You should call our office if this develops.      Swelling  and/or discoloration around your surgical site is common, particularly when performed around the eye.    All wounds normally drain.  The larger the wound the more drainage there will be.  After 7-10 days, you will notice the wound beginning to shrink and new skin will begin to grow.  The wound is healed when you can see skin has formed over the entire area.  A healed wound has a healthy, shiny look to the surface and is red to dark pink in color to normalize.  Wounds may take approximately 4-6 weeks to heal.  Larger wounds may take 6-8 weeks.  After the wound is healed you may discontinue dressing changes.    You may experience a sensation of tightness as your wound heals. This is normal and will gradually subside.    Your healed wound may be sensitive to temperature changes. This sensitivity improves with time, but if you re having a lot of discomfort, try to avoid temperature extremes.    Patients frequently experience itching after their wound appears to have healed because of the continue healing under the skin.  Plain Vaseline will help relieve the itching.        POSSIBLE COMPLICATIONS    BLEEDING:    Leave the bandage in place.  Use tightly rolled up gauze or a cloth to apply direct pressure over the bandage for 30  minutes.  Reapply pressure for an additional 30 minutes if necessary  Use additional gauze and tape to maintain pressure once the bleeding has stopped.       Patient Education       Proper skin care from [facility]:    -Eliminate harsh soaps as they strip the natural oils from the skin, often resulting in dry itchy skin ( i.e. Dial, Zest, Malagasy Spring)  -Use mild soaps such as Cetaphil or Dove Sensitive Skin in the shower. You do not need to use soap on arms,  legs, and trunk every time you shower unless visibly soiled.   -Avoid hot or cold showers.  -After showering, lightly dry off and apply moisturizing within 2-3 minutes. This will help trap moisture in the skin.   -Aggressive use of a moisturizer at least 1-2 times a day to the entire body (including -Vanicream, Cetaphil, Aquaphor or Cerave) and moisturize hands after every washing.  -We recommend using moisturizers that come in a tub that needs to be scooped out, not a pump. This has more of an oil base. It will hold moisture in your skin much better than a water base moisturizer. The above recommended are non-pore clogging.      Wear a sunscreen with at least SPF 30 on your face, ears, neck and V of the chest daily. Wear sunscreen on other areas of the body if those areas are exposed to the sun throughout the day. Sunscreens can contain physical and/or chemical blockers. Physical blockers are less likely to clog pores, these include zinc oxide and titanium dioxide. Reapply every two hour and after swimming.     Sunscreen examples: https://www.ewg.org/sunscreen/    UV radiation  UVA radiation remains constant throughout the day and throughout the year. It is a longer wavelength than UVB and therefore penetrates deeper into the skin leading to immediate and delayed tanning, photoaging, and skin cancer. 70-80% of UVA and UVB radiation occurs between the hours of 10am-2pm.  UVB radiation  UVB radiation causes the most harmful effects and is more significant during the summer months. However, snow and ice can reflect UVB radiation leading to skin damage during the winter months as well. UVB radiation is responsible for tanning, burning, inflammation, delayed erythema (pinkness), pigmentation (brown spots), and skin cancer.     I recommend self monthly full body exams and yearly full body exams with a dermatology provider. If you develop a new or changing lesion please follow up for examination. Most skin cancers are  pink and scaly or pink and pearly. However, we do see blue/brown/black skin cancers.  Consider the ABCDEs of melanoma when giving yourself your monthly full body exam ( don't forget the groin, buttocks, feet, toes, etc). A-asymmetry, B-borders, C-color, D-diameter, E-elevation or evolving. If you see any of these changes please follow up in clinic. If you cannot see your back I recommend purchasing a hand held mirror to use with a larger wall mirror.       Checking for Skin Cancer  You can find cancer early by checking your skin each month. There are 3 kinds of skin cancer. They are melanoma, basal cell carcinoma, and squamous cell carcinoma. Doing monthly skin checks is the best way to find new marks or skin changes. Follow the instructions below for checking your skin.   The ABCDEs of checking moles for melanoma   Check your moles or growths for signs of melanoma using ABCDE:   Asymmetry: the sides of the mole or growth don t match  Border: the edges are ragged, notched, or blurred  Color: the color within the mole or growth varies  Diameter: the mole or growth is larger than 6 mm (size of a pencil eraser)  Evolving: the size, shape, or color of the mole or growth is changing (evolving is not shown in the images below)    Checking for other types of skin cancer  Basal cell carcinoma or squamous cell carcinoma have symptoms such as:     A spot or mole that looks different from all other marks on your skin  Changes in how an area feels, such as itching, tenderness, or pain  Changes in the skin's surface, such as oozing, bleeding, or scaliness  A sore that does not heal  New swelling or redness beyond the border of a mole    Who s at risk?  Anyone can get skin cancer. But you are at greater risk if you have:   Fair skin, light-colored hair, or light-colored eyes  Many moles or abnormal moles on your skin  A history of sunburns from sunlight or tanning beds  A family history of skin cancer  A history of exposure to  radiation or chemicals  A weakened immune system  If you have had skin cancer in the past, you are at risk for recurring skin cancer.   How to check your skin  Do your monthly skin checkups in front of a full-length mirror. Check all parts of your body, including your:   Head (ears, face, neck, and scalp)  Torso (front, back, and sides)  Arms (tops, undersides, upper, and lower armpits)  Hands (palms, backs, and fingers, including under the nails)  Buttocks and genitals  Legs (front, back, and sides)  Feet (tops, soles, toes, including under the nails, and between toes)  If you have a lot of moles, take digital photos of them each month. Make sure to take photos both up close and from a distance. These can help you see if any moles change over time.   Most skin changes are not cancer. But if you see any changes in your skin, call your doctor right away. Only he or she can diagnose a problem. If you have skin cancer, seeing your doctor can be the first step toward getting the treatment that could save your life.   [employer] last reviewed this educational content on 4/1/2019 2000-2020 [employer]. [address] [place] [address]. All rights reserved. This information is not intended as a substitute for professional medical care. Always follow your healthcare professional's instructions.       When should I call my doctor?  If you are worsening or not improving, please, contact us or seek urgent care as noted below.     Who should I call with questions (adults)?  [facility] (adult and pediatric): [phone]  [facility] (adult): [phone]  [facility] ([place], [place], [place] and [place]) [phone]  For urgent needs outside of business hours call the [facility] at [phone] and ask for the dermatology resident on call to be paged  If this is a medical emergency and you are unable  to reach an ER, Call 911      If you need a prescription refill, please contact your pharmacy. Refills are approved or denied by our Physicians during normal business hours, Monday through Fridays  Per office policy, refills will not be granted if you have not been seen within the past year (or sooner depending on your child's condition)

## 2024-07-02 NOTE — TELEPHONE ENCOUNTER
Patient Contact    Attempt # 1    Was call answered?  Yes    Results given to patient. Patient understands and has no questions.    Isamar Arenas MA    [facility]   [phone]

## 2024-07-02 NOTE — LETTER
2024      Dillon Matias  [address] [facility] [id number]      Dear Colleague,    Thank you for referring your patient, Dillon Matias, to the [facility]. Please see a copy of my visit note below.    Dillon Matias is an extremely pleasant 69 year old year old male patient here today for spot onleft cheek.  Patient has no other skin complaints today.  Remainder of the HPI, Meds, PMH, Allergies, FH, and SH was reviewed in chart.      Past Medical History:   Diagnosis Date     Actinic keratosis      Basal cell carcinoma      Heart disease 2015       Past Surgical History:   Procedure Laterality Date     COLONOSCOPY       DAVINCI XI HERNIORRHAPHY INGUINAL Right 2024    Procedure: RIGHT HERNIORRHAPHY, INGUINAL, ROBOT-ASSISTED, LAPAROSCOPIC, USING DA ANNA XI;  Surgeon: Bryan Alex MD;  Location: [facility] OR     INSERT SEED MARKER / MATRIX N/A 2023    Procedure: Transrectal Ultrasound Guided Placement of Fiducials and SpaceOar;  Surgeon: Michael Almendarez MD;  Location:  OR     ORTHOPEDIC SURGERY  2010    RIGHT KNEE        Family History   Problem Relation Age of Onset     Anesthesia Reaction No family hx of      Venous thrombosis No family hx of        Social History     Socioeconomic History     Marital status:      Spouse name: Not on file     Number of children: Not on file     Years of education: Not on file     Highest education level: Not on file   Occupational History     Not on file   Tobacco Use     Smoking status: Former     Current packs/day: 0.00     Average packs/day: 1 pack/day for 45.1 years (45.1 ttl pk-yrs)     Types: Cigarettes     Start date: 1970     Quit date: 7/15/2015     Years since quittin.9     Passive exposure: Past     Smokeless tobacco: Never   Vaping Use     Vaping status: Never Used   Substance and Sexual Activity     Alcohol use: Not Currently     Drug use: Not Currently     Sexual activity: Not Currently      Partners: Female     Birth control/protection: Post-menopausal   Other Topics Concern     Not on file   Social History Narrative     Not on file     Social Determinants of Health     Financial Resource Strain: Low Risk  (1/10/2024)    Financial Resource Strain      Within the past 12 months, have you or your family members you live with been unable to get utilities (heat, electricity) when it was really needed?: No   Food Insecurity: Low Risk  (1/10/2024)    Food Insecurity      Within the past 12 months, did you worry that your food would run out before you got money to buy more?: No      Within the past 12 months, did the food you bought just not last and you didn t have money to get more?: No   Transportation Needs: Low Risk  (1/10/2024)    Transportation Needs      Within the past 12 months, has lack of transportation kept you from medical appointments, getting your medicines, non-medical meetings or appointments, work, or from getting things that you need?: No   Physical Activity: Not on file   Stress: Not on file   Social Connections: Not on file   Interpersonal Safety: Low Risk  (1/29/2024)    Interpersonal Safety      Do you feel physically and emotionally safe where you currently live?: Yes      Within the past 12 months, have you been hit, slapped, kicked or otherwise physically hurt by someone?: No      Within the past 12 months, have you been humiliated or emotionally abused in other ways by your partner or ex-partner?: No   Housing Stability: Low Risk  (1/10/2024)    Housing Stability      Do you have housing? : Yes      Are you worried about losing your housing?: No       Outpatient Encounter Medications as of 7/2/2024   Medication Sig Dispense Refill     acetaminophen (TYLENOL) 325 MG tablet Take 2 tablets (650 mg) by mouth every 4 hours as needed for mild pain 50 tablet 0     aspirin (ASA) 81 MG chewable tablet Take 1 tablet (81 mg) by mouth daily 90 tablet 3     atorvastatin (LIPITOR) 80 MG tablet  Take 1 tablet (80 mg) by mouth every evening 90 tablet 3     Multiple Vitamin (MULTIVITAMIN ADULT PO) Take 1 tablet by mouth every morning       oxyCODONE (ROXICODONE) 5 MG tablet Take 1 tablet (5 mg) by mouth every 6 hours as needed for moderate to severe pain 12 tablet 0     senna-docusate (SENOKOT-S/PERICOLACE) 8.6-50 MG tablet Take 1-2 tablets by mouth 2 times daily 30 tablet 0     triamcinolone (KENALOG) 0.1 % external cream Apply to AA on the leg BID x 1-2 week then PRN only 454 g 2     No facility-administered encounter medications on file as of 7/2/2024.             O:   NAD, WDWN, Alert & Oriented, Mood & Affect wnl, Vitals stable   General appearance normal   Vitals stable   Alert, oriented and in no acute distress     L post auricular scalp 8mm shiny scaly papule   L jawline gritty scaly papule    Stuck on papules and brown macules on trunk and ext   Red papules on trunk  Flesh colored papules on trunk     The remainder of the full exam was normal; the following areas were examined:  conjunctiva/lids, , neck, peripheral vascular system, abdomen, lymph nodes, digits/nails, eccrine and apocrine glands, scalp/hair, face, neck, chest, abdomen, buttocks, back, RUE, LUE, RLE, LLE       Eyes: Conjunctivae/lids:Normal     ENT: Lips, mucosa: normal    MSK:Normal    Cardiovascular: peripheral edema none    Pulm: Breathing Normal    Lymph Nodes: No Head and Neck Lymphadenopathy     Neuro/Psych: Orientation:Alert and Orientedx3 ; Mood/Affect:normal       MICRO:   L PA scalp:Sharply demarcated exophytic epidermal growth composed of sheets of small cells basaloid cells with variable melanin pigmentation.  There are keratin-filled cysts throughout.  The dermis is overall unremarkable with a bland monomorphic inflammatory infiltrate.      A/P:  1. Seborrheic keratosis, lentigo, angioma, dermal nevus. Hx of non-melanoma skin cancer   2. L jawline actinic keratosis   LN2:  Treated with LN2 for 5s for 1-2 cycles. Warned  risks of blistering, pain, pigment change, scarring, and incomplete resolution.  Advised patient to return if lesions do not completely resolve.  Wound care sheet given.  3. L PA scalp r/o squamous cell carcinoma   TANGENTIAL BIOPSY IN HOUSE:  After consent, anesthesia with LEC and prep, tangential excision performed and dx above confirmed with frozen section histology.  No complications and routine wound care.  Patient is not on  anticoagulants and risk of bleeding discussed with patient.       I have personally reviewed all specimens and/or slides and used them with my medical judgement to determine or confirm the final diagnosis.     Patient told result inflamed seborrheic keratosis No further treatment  .      It was a pleasure speaking to Dillon Matias today.  Previous clinic notes and pertinent laboratory tests were reviewed prior to Dillon Matias's visit.  Signs and Symptoms of skin cancer discussed with patient.  Patient encouraged to perform monthly skin exams.  UV precautions reviewed with patient.  Risks of non-melanoma skin cancer discussed with patient   Return to clinic 12 months      Again, thank you for allowing me to participate in the care of your patient.        Sincerely,        Herrera Paul MD

## 2024-07-03 ENCOUNTER — MYC MEDICAL ADVICE (OUTPATIENT)
Dept: DERMATOLOGY | Facility: CLINIC | Age: 69
End: 2024-07-03
Payer: COMMERCIAL

## 2024-07-03 DIAGNOSIS — L30.9 DERMATITIS: ICD-10-CM

## 2024-07-03 NOTE — TELEPHONE ENCOUNTER
Patient asking for cream for his leg that was supposed to have been sent to the pharmacy when seen yesterday.     Looks like he was previously given Triamcinolone cream by Eduarda in Oct 2021.     Adele Kuhn RN

## 2024-07-05 RX ORDER — TRIAMCINOLONE ACETONIDE 1 MG/G
CREAM TOPICAL
Qty: 454 G | Refills: 2 | Status: SHIPPED | OUTPATIENT
Start: 2024-07-05

## 2024-10-14 ENCOUNTER — IMMUNIZATION (OUTPATIENT)
Dept: FAMILY MEDICINE | Facility: CLINIC | Age: 69
End: 2024-10-14
Payer: COMMERCIAL

## 2024-10-14 DIAGNOSIS — Z23 NEED FOR PROPHYLACTIC VACCINATION AND INOCULATION AGAINST INFLUENZA: Primary | ICD-10-CM

## 2024-10-14 PROCEDURE — 99207 PR NO CHARGE NURSE ONLY: CPT

## 2024-10-14 PROCEDURE — 90662 IIV NO PRSV INCREASED AG IM: CPT

## 2024-10-14 PROCEDURE — 90480 ADMN SARSCOV2 VAC 1/ONLY CMP: CPT

## 2024-10-14 PROCEDURE — G0008 ADMIN INFLUENZA VIRUS VAC: HCPCS

## 2024-10-14 PROCEDURE — 91320 SARSCV2 VAC 30MCG TRS-SUC IM: CPT

## 2024-11-16 ENCOUNTER — APPOINTMENT (OUTPATIENT)
Dept: CT IMAGING | Facility: CLINIC | Age: 69
End: 2024-11-16
Attending: EMERGENCY MEDICINE
Payer: COMMERCIAL

## 2024-11-16 ENCOUNTER — HOSPITAL ENCOUNTER (EMERGENCY)
Facility: CLINIC | Age: 69
Discharge: HOME | End: 2024-11-16
Attending: EMERGENCY MEDICINE | Admitting: EMERGENCY MEDICINE
Payer: COMMERCIAL

## 2024-11-16 VITALS
HEART RATE: 65 BPM | TEMPERATURE: 98.5 F | RESPIRATION RATE: 18 BRPM | DIASTOLIC BLOOD PRESSURE: 88 MMHG | OXYGEN SATURATION: 99 % | SYSTOLIC BLOOD PRESSURE: 147 MMHG

## 2024-11-16 DIAGNOSIS — R10.31 RIGHT LOWER QUADRANT ABDOMINAL PAIN: ICD-10-CM

## 2024-11-16 DIAGNOSIS — N17.9 ACUTE KIDNEY INJURY (H): ICD-10-CM

## 2024-11-16 DIAGNOSIS — N20.1 URETERAL STONE: ICD-10-CM

## 2024-11-16 LAB
ALBUMIN UR-MCNC: NEGATIVE MG/DL
ANION GAP SERPL CALCULATED.3IONS-SCNC: 10 MMOL/L (ref 7–15)
APPEARANCE UR: CLEAR
BASOPHILS # BLD AUTO: 0 10E3/UL (ref 0–0.2)
BASOPHILS NFR BLD AUTO: 0 %
BILIRUB UR QL STRIP: NEGATIVE
BUN SERPL-MCNC: 18.5 MG/DL (ref 8–23)
CALCIUM SERPL-MCNC: 9.3 MG/DL (ref 8.8–10.4)
CHLORIDE SERPL-SCNC: 102 MMOL/L (ref 98–107)
COLOR UR AUTO: YELLOW
CREAT SERPL-MCNC: 1.44 MG/DL (ref 0.67–1.17)
EGFRCR SERPLBLD CKD-EPI 2021: 52 ML/MIN/1.73M2
EOSINOPHIL # BLD AUTO: 0.2 10E3/UL (ref 0–0.7)
EOSINOPHIL NFR BLD AUTO: 2 %
ERYTHROCYTE [DISTWIDTH] IN BLOOD BY AUTOMATED COUNT: 12.5 % (ref 10–15)
GLUCOSE SERPL-MCNC: 109 MG/DL (ref 70–99)
GLUCOSE UR STRIP-MCNC: NEGATIVE MG/DL
HCO3 SERPL-SCNC: 27 MMOL/L (ref 22–29)
HCT VFR BLD AUTO: 41.4 % (ref 40–53)
HGB BLD-MCNC: 14.7 G/DL (ref 13.3–17.7)
HGB UR QL STRIP: ABNORMAL
IMM GRANULOCYTES # BLD: 0 10E3/UL
IMM GRANULOCYTES NFR BLD: 0 %
KETONES UR STRIP-MCNC: NEGATIVE MG/DL
LEUKOCYTE ESTERASE UR QL STRIP: NEGATIVE
LYMPHOCYTES # BLD AUTO: 1.2 10E3/UL (ref 0.8–5.3)
LYMPHOCYTES NFR BLD AUTO: 14 %
MCH RBC QN AUTO: 31.1 PG (ref 26.5–33)
MCHC RBC AUTO-ENTMCNC: 35.5 G/DL (ref 31.5–36.5)
MCV RBC AUTO: 88 FL (ref 78–100)
MONOCYTES # BLD AUTO: 0.8 10E3/UL (ref 0–1.3)
MONOCYTES NFR BLD AUTO: 10 %
MUCOUS THREADS #/AREA URNS LPF: PRESENT /LPF
NEUTROPHILS # BLD AUTO: 5.8 10E3/UL (ref 1.6–8.3)
NEUTROPHILS NFR BLD AUTO: 73 %
NITRATE UR QL: NEGATIVE
NRBC # BLD AUTO: 0 10E3/UL
NRBC BLD AUTO-RTO: 0 /100
PH UR STRIP: 6 [PH] (ref 5–7)
PLATELET # BLD AUTO: 184 10E3/UL (ref 150–450)
POTASSIUM SERPL-SCNC: 4.2 MMOL/L (ref 3.4–5.3)
RBC # BLD AUTO: 4.73 10E6/UL (ref 4.4–5.9)
RBC URINE: 11 /HPF
SODIUM SERPL-SCNC: 139 MMOL/L (ref 135–145)
SP GR UR STRIP: 1.01 (ref 1–1.03)
SQUAMOUS EPITHELIAL: <1 /HPF
UROBILINOGEN UR STRIP-MCNC: NORMAL MG/DL
WBC # BLD AUTO: 8 10E3/UL (ref 4–11)
WBC URINE: 1 /HPF

## 2024-11-16 PROCEDURE — 81003 URINALYSIS AUTO W/O SCOPE: CPT | Performed by: EMERGENCY MEDICINE

## 2024-11-16 PROCEDURE — 85025 COMPLETE CBC W/AUTO DIFF WBC: CPT | Performed by: EMERGENCY MEDICINE

## 2024-11-16 PROCEDURE — 99284 EMERGENCY DEPT VISIT MOD MDM: CPT | Performed by: EMERGENCY MEDICINE

## 2024-11-16 PROCEDURE — 96374 THER/PROPH/DIAG INJ IV PUSH: CPT | Performed by: EMERGENCY MEDICINE

## 2024-11-16 PROCEDURE — 36415 COLL VENOUS BLD VENIPUNCTURE: CPT | Performed by: EMERGENCY MEDICINE

## 2024-11-16 PROCEDURE — 99285 EMERGENCY DEPT VISIT HI MDM: CPT | Mod: 25 | Performed by: EMERGENCY MEDICINE

## 2024-11-16 PROCEDURE — 74176 CT ABD & PELVIS W/O CONTRAST: CPT

## 2024-11-16 PROCEDURE — 250N000011 HC RX IP 250 OP 636: Performed by: EMERGENCY MEDICINE

## 2024-11-16 PROCEDURE — 80048 BASIC METABOLIC PNL TOTAL CA: CPT | Performed by: EMERGENCY MEDICINE

## 2024-11-16 RX ORDER — TAMSULOSIN HYDROCHLORIDE 0.4 MG/1
0.4 CAPSULE ORAL DAILY
Qty: 7 CAPSULE | Refills: 0 | Status: SHIPPED | OUTPATIENT
Start: 2024-11-16

## 2024-11-16 RX ORDER — KETOROLAC TROMETHAMINE 15 MG/ML
15 INJECTION, SOLUTION INTRAMUSCULAR; INTRAVENOUS
Status: COMPLETED | OUTPATIENT
Start: 2024-11-16 | End: 2024-11-16

## 2024-11-16 RX ADMIN — KETOROLAC TROMETHAMINE 15 MG: 15 INJECTION, SOLUTION INTRAMUSCULAR; INTRAVENOUS at 16:34

## 2024-11-16 ASSESSMENT — COLUMBIA-SUICIDE SEVERITY RATING SCALE - C-SSRS
6. HAVE YOU EVER DONE ANYTHING, STARTED TO DO ANYTHING, OR PREPARED TO DO ANYTHING TO END YOUR LIFE?: NO
1. IN THE PAST MONTH, HAVE YOU WISHED YOU WERE DEAD OR WISHED YOU COULD GO TO SLEEP AND NOT WAKE UP?: NO
2. HAVE YOU ACTUALLY HAD ANY THOUGHTS OF KILLING YOURSELF IN THE PAST MONTH?: NO

## 2024-11-16 ASSESSMENT — ACTIVITIES OF DAILY LIVING (ADL): ADLS_ACUITY_SCORE: 0

## 2024-11-16 NOTE — DISCHARGE INSTRUCTIONS
Recommend straining the urine.    Follow-up in clinic for routine cares.  Follow-up with urology in future appointment.    Take Flomax as prescribed.    Tylenol and ibuprofen as needed for pain.

## 2024-11-16 NOTE — ED NOTES
69-year-old male medical history significant for hyperlipidemia, coronary artery disease, history of prostate cancer presented initially to the urgent care with concern over dysuria, urinary frequency, urgency which began upon waking this morning.  He subsequently developed right-sided flank pain, chills.  No objective fever, dyspnea, wheezing, nausea, vomiting.  No prior history of urinary tract infections or nephrolithiasis.  I discussed with patient typical scope evaluation available in urgent care and given concerns for flank pain did recommend evaluation in the emergency department.  Patient was amenable to plan.  He was transferred to ED waiting.    Disclaimer: This note consists of symbols derived from keyboarding, dictation, and/or voice recognition software. As a result, there may be errors in the script that have gone undetected.  Please consider this when interpreting information found in the chart.       Jess Almanza PA-C  11/16/24 9211

## 2024-11-16 NOTE — ED PROVIDER NOTES
ED Provider Note  [facility]      History     Chief Complaint   Patient presents with    Flank Pain     HPI  Dillon Matias is a 69 year old male who has medical history significant for coronary artery disease, hyperlipidemia, history of prostate cancer, presenting the emergency department with concerns regarding dysuria, in addition to right sided flank pain.  Symptoms began this morning after awakening.  He was asymptomatic yesterday evening.  No left-sided pains.  Patient does have history of inguinal hernia surgery, and no other abdominal surgeries.  Denies any fever, or chills.        Independent Historian:        Review of External Notes:  I reviewed prior office visit from June 11.  Patient has history of robotic hernia repair of the right inguinal hernia.      Allergies:  No Known Allergies    Problem List:    Patient Active Problem List    Diagnosis Date Noted    Aortic ectasia (H) 01/29/2024     Priority: Medium    Prostate cancer (H) 01/16/2024     Priority: Medium    Obstructive sleep apnea of adult 08/16/2023     Priority: Medium    Actinic keratosis 02/07/2020     Priority: Medium    Other eczema 02/07/2020     Priority: Medium    Coronary artery disease involving native coronary artery of native heart without angina pectoris 06/15/2015     Priority: Medium    Dyslipidemia 06/10/2015     Priority: Medium    Tobacco abuse 06/10/2015     Priority: Medium    Erectile dysfunction 06/10/2015     Priority: Medium    Hyperlipidemia 11/16/2005     Priority: Medium     Formatting of this note might be different from the original.   LW Onset:  [id number]          Past Medical History:    Past Medical History:   Diagnosis Date    Actinic keratosis     Basal cell carcinoma     Heart disease 07/2015       Past Surgical History:    Past Surgical History:   Procedure Laterality Date    COLONOSCOPY      DAVINCI XI HERNIORRHAPHY INGUINAL Right 5/30/2024    Procedure: RIGHT HERNIORRHAPHY,  INGUINAL, ROBOT-ASSISTED, LAPAROSCOPIC, USING DA ANNA XI;  Surgeon: Bryan Alex MD;  Location: [facility] OR    INSERT SEED MARKER / MATRIX N/A 2023    Procedure: Transrectal Ultrasound Guided Placement of Fiducials and SpaceOar;  Surgeon: Michael Almendarez MD;  Location: [facility] OR    ORTHOPEDIC SURGERY  2010    RIGHT KNEE       Family History:    Family History   Problem Relation Age of Onset    Anesthesia Reaction No family hx of     Venous thrombosis No family hx of        Social History:  Marital Status:   [2]  Social History     Tobacco Use    Smoking status: Former     Current packs/day: 0.00     Average packs/day: 1 pack/day for 45.1 years (45.1 ttl pk-yrs)     Types: Cigarettes     Start date: 1970     Quit date: 7/15/2015     Years since quittin.3     Passive exposure: Past    Smokeless tobacco: Never   Vaping Use    Vaping status: Never Used   Substance Use Topics    Alcohol use: Not Currently    Drug use: Not Currently        Medications:    acetaminophen (TYLENOL) 325 MG tablet  aspirin (ASA) 81 MG chewable tablet  atorvastatin (LIPITOR) 80 MG tablet  Multiple Vitamin (MULTIVITAMIN ADULT PO)  oxyCODONE (ROXICODONE) 5 MG tablet  senna-docusate (SENOKOT-S/PERICOLACE) 8.6-50 MG tablet  tamsulosin (FLOMAX) 0.4 MG capsule  triamcinolone (KENALOG) 0.1 % external cream          Review of Systems  A medically appropriate review of systems was performed with pertinent positives and negatives noted in the HPI, and all other systems negative.    Physical Exam   Patient Vitals for the past 24 hrs:   BP Temp Temp src Pulse Resp SpO2   24 1611 (!) 147/88 98.5  F (36.9  C) Tympanic 65 18 99 %          Physical Exam  General: alert and in mild acute distress on arrival  Head: atraumatic, normocephalic  Lungs:  nonlabored  CV:  extremities warm and perfused  Abd: nondistended.  Tender of the right lower quadrant.  Skin: no rashes, no diaphoresis and skin color normal  Neuro: Patient awake,  alert, speech is fluent,   Psychiatric: affect/mood normal,        ED Course                 Procedures                          Results for orders placed or performed during the hospital encounter of 11/16/24 (from the past 24 hours)   UA with Microscopic reflex to Culture    Specimen: Urine, Clean Catch   Result Value Ref Range    Color Urine Yellow Colorless, Straw, Light Yellow, Yellow    Appearance Urine Clear Clear    Glucose Urine Negative Negative mg/dL    Bilirubin Urine Negative Negative    Ketones Urine Negative Negative mg/dL    Specific Gravity Urine 1.014 1.003 - 1.035    Blood Urine Small (A) Negative    pH Urine 6.0 5.0 - 7.0    Protein Albumin Urine Negative Negative mg/dL    Urobilinogen Urine Normal Normal, 2.0 mg/dL    Nitrite Urine Negative Negative    Leukocyte Esterase Urine Negative Negative    Mucus Urine Present (A) None Seen /LPF    RBC Urine 11 (H) <=2 /HPF    WBC Urine 1 <=5 /HPF    Squamous Epithelials Urine <1 <=1 /HPF    Narrative    Urine Culture not indicated   CBC with platelets differential    Narrative    The following orders were created for panel order CBC with platelets differential.  Procedure                               Abnormality         Status                     ---------                               -----------         ------                     CBC with platelets and d...[[id number]]                      Final result                 Please view results for these tests on the individual orders.   Basic metabolic panel   Result Value Ref Range    Sodium 139 135 - 145 mmol/L    Potassium 4.2 3.4 - 5.3 mmol/L    Chloride 102 98 - 107 mmol/L    Carbon Dioxide (CO2) 27 22 - 29 mmol/L    Anion Gap 10 7 - 15 mmol/L    Urea Nitrogen 18.5 8.0 - 23.0 mg/dL    Creatinine 1.44 (H) 0.67 - 1.17 mg/dL    GFR Estimate 52 (L) >60 mL/min/1.73m2    Calcium 9.3 8.8 - 10.4 mg/dL    Glucose 109 (H) 70 - 99 mg/dL   CBC with platelets and differential   Result Value Ref Range    WBC Count  8.0 4.0 - 11.0 10e3/uL    RBC Count 4.73 4.40 - 5.90 10e6/uL    Hemoglobin 14.7 13.3 - 17.7 g/dL    Hematocrit 41.4 40.0 - 53.0 %    MCV 88 78 - 100 fL    MCH 31.1 26.5 - 33.0 pg    MCHC 35.5 31.5 - 36.5 g/dL    RDW 12.5 10.0 - 15.0 %    Platelet Count 184 150 - 450 10e3/uL    % Neutrophils 73 %    % Lymphocytes 14 %    % Monocytes 10 %    % Eosinophils 2 %    % Basophils 0 %    % Immature Granulocytes 0 %    NRBCs per 100 WBC 0 <1 /100    Absolute Neutrophils 5.8 1.6 - 8.3 10e3/uL    Absolute Lymphocytes 1.2 0.8 - 5.3 10e3/uL    Absolute Monocytes 0.8 0.0 - 1.3 10e3/uL    Absolute Eosinophils 0.2 0.0 - 0.7 10e3/uL    Absolute Basophils 0.0 0.0 - 0.2 10e3/uL    Absolute Immature Granulocytes 0.0 <=0.4 10e3/uL    Absolute NRBCs 0.0 10e3/uL   CT Abdomen Pelvis w/o Contrast    Narrative    EXAM: CT ABDOMEN PELVIS W/O CONTRAST  LOCATION: [facility]  DATE: 11/16/2024    INDICATION: right flank pain.  dysuria.  concern for stone  COMPARISON: None.  TECHNIQUE: CT scan of the abdomen and pelvis was performed without IV contrast. Multiplanar reformats were obtained. Dose reduction techniques were used.  CONTRAST: None.    FINDINGS:   LOWER CHEST: Normal.    HEPATOBILIARY: Liver and gallbladder within normal limits.    PANCREAS: Normal.    SPLEEN: Normal.    ADRENAL GLANDS: Normal.    KIDNEYS/BLADDER: 4 mm stone is noted at the right ureterovesicular junction which is causing minimal right hydronephrosis and right hydroureter with perinephric stranding noted around the right kidney and right ureter. Additional 2 mm stone noted within   the right kidney. Few small nonobstructive stones noted within the left kidney measuring up to 3 mm. No left hydronephrosis.    BOWEL: Diverticulosis of the colon. No acute inflammatory change. No obstruction.     LYMPH NODES: Normal.    VASCULATURE: Mild to moderate atherosclerotic disease of the abdominal aorta and its branches.    PELVIC ORGANS: Bladder  decompressed.    MUSCULOSKELETAL: Degenerative changes of spine and hips.      Impression    IMPRESSION:   1.  4 mm stone is noted at the right ureterovesicular junction which is causing minimal right hydroureter and hydronephrosis with adjacent perinephric and periureteric stranding. Additional small nonobstructive stones noted within both kidneys.         MEDICATIONS GIVEN IN THE EMERGENCY DEPARTMENT:  Medications   ketorolac (TORADOL) injection 15 mg (15 mg Intravenous $Given 11/16/24 [id number])           Independent Interpretation (X-rays, CTs, rhythm strip):  CT images personally reviewed.  Patient with distal right UVJ stone measuring 4 mm.  Mild hydroureteronephrosis present.  Other intrarenal stones are present.    Consultations/Discussion of Management or Tests:         Social Determinants of Health affecting care:         Assessments & Plan (with Medical Decision Making)  69 year old male who presents to the Emergency Department for evaluation of right-sided flank pain, in addition to dysuria, urgency, and frequency.  Pain worsened throughout the day today.  He has been able to eat and drink,  and still does maintain his appetite.  Denies any fever, or chills.  Concern is for possible UTI versus pyelonephritis versus kidney stone.  Appendicitis remains on the differential given the tenderness of the right lower quadrant, however given the appetite which is still present, I feel appendicitis is less likely.  Ultimately, patient is found to have a 4 mm distal right UVJ stone.  He is without significant amounts of pain, and declined analgesic medications during ED course.  Patient without any signs of infection on urinalysis.  CT scan of the abdomen/pelvis showing no other acute findings.  Does have intrarenal stones.  Will be referred to urology.  Tamsulosin as prescribed.  Follow-up recommended in urology, with return precautions discussed.  I did discuss the slightly elevated creatinine with patient, however  able to tolerate oral fluids, and therefore we will continue to push the oral fluids given IV fluid shortage currently existing.           I have reviewed the nursing notes.    I have reviewed the findings, diagnosis, plan and need for follow up with the patient.       Critical Care time:  none      NEW PRESCRIPTIONS STARTED AT TODAY'S ER VISIT  New Prescriptions    TAMSULOSIN (FLOMAX) 0.4 MG CAPSULE    Take 1 capsule (0.4 mg) by mouth daily.       Final diagnoses:   Ureteral stone   Right lower quadrant abdominal pain       11/16/2024   [facility] EMERGENCY DEPT       Kavon Dominguez MD  11/16/24 [id number]

## 2024-11-26 NOTE — CONFIDENTIAL NOTE
Chief Complaint:   Kidney stones         History of Present Illness:   Dillon Matias is a 69 year old male presenting for an evaluation of kidney stones.     The patient presented to the ED on 11/16/2024 for right flank pain. UA was notable for 11 RBCs. WBC count was WNL. Creatinine was 1.44, increased from 0.89 at baseline. CT abdomen pelvis was notable for a 4 mm stone at the right UVJ and non-obstructing stones bilaterally.     He is asymptomatic. He denies dysuria, flank pain, and gross hematuria. He has been straining his urine and has not seen the stone pass.     This is his first kidney stone.          Past Medical History:     Past Medical History:   Diagnosis Date    Actinic keratosis     Basal cell carcinoma     Heart disease 07/2015            Past Surgical History:     Past Surgical History:   Procedure Laterality Date    COLONOSCOPY      DAVINCI XI HERNIORRHAPHY INGUINAL Right 5/30/2024    Procedure: RIGHT HERNIORRHAPHY, INGUINAL, ROBOT-ASSISTED, LAPAROSCOPIC, USING DA ANNA XI;  Surgeon: Bryan Alex MD;  Location: [facility]    INSERT SEED MARKER / MATRIX N/A 9/8/2023    Procedure: Transrectal Ultrasound Guided Placement of Fiducials and SpaceOar;  Surgeon: Michael Almendarez MD;  Location: [facility]    ORTHOPEDIC SURGERY  08/2010    RIGHT KNEE            Medications     Current Outpatient Medications   Medication Sig Dispense Refill    acetaminophen (TYLENOL) 325 MG tablet Take 2 tablets (650 mg) by mouth every 4 hours as needed for mild pain 50 tablet 0    aspirin (ASA) 81 MG chewable tablet Take 1 tablet (81 mg) by mouth daily 90 tablet 3    atorvastatin (LIPITOR) 80 MG tablet Take 1 tablet (80 mg) by mouth every evening 90 tablet 3    Multiple Vitamin (MULTIVITAMIN ADULT PO) Take 1 tablet by mouth every morning      oxyCODONE (ROXICODONE) 5 MG tablet Take 1 tablet (5 mg) by mouth every 6 hours as needed for moderate to severe pain 12 tablet 0    senna-docusate (SENOKOT-S/PERICOLACE)  8.6-50 MG tablet Take 1-2 tablets by mouth 2 times daily 30 tablet 0    tamsulosin (FLOMAX) 0.4 MG capsule Take 1 capsule (0.4 mg) by mouth daily. 30 capsule 0    triamcinolone (KENALOG) 0.1 % external cream Apply to affected area on the leg twice a day for 1-2 weeks, then as needed only 454 g 2     No current facility-administered medications for this visit.            Allergies:   Patient has no known allergies.         Review of Systems:  From intake questionnaire   Negative 14 system review except as noted on HPI, nurse's note.         Physical Exam:   Patient is a 69 year old male evaluated via video visit.       Labs and Pathology:    I personally reviewed all applicable laboratory data and went over findings with patient  Significant for:    CBC RESULTS:  Recent Labs   Lab Test 11/16/24  1631 05/13/24  0919 09/03/23  0952   WBC 8.0 5.4 6.0   HGB 14.7 14.7 14.5    167 182        BMP RESULTS:  Recent Labs   Lab Test 11/16/24  1631 05/13/24  0919 01/29/24  1008 09/03/23  0952    141 140 139   POTASSIUM 4.2 4.1 3.9 4.0   CHLORIDE 102 107 104 105   CO2 27 26 25 26   ANIONGAP 10 8 11 8   * 103* 100* 99   BUN 18.5 14.9 13.4 13.1   CR 1.44* 0.89 0.93 0.88   GFRESTIMATED 52* >90 88 >90   SABRA 9.3 9.4 9.3 9.6       UA RESULTS:   Recent Labs   Lab Test 11/16/24  1622 09/03/23  0952 03/01/23  0832   SG 1.014 1.010 >=1.030   URINEPH 6.0 7.0 5.5   NITRITE Negative Negative Negative   RBCU 11* 0-2 None Seen   WBCU 1 None Seen None Seen       PSA RESULTS  Prostate Specific Antigen Screen   Date Value Ref Range Status   01/20/2023 6.26 (H) 0.00 - 4.50 ng/mL Final     PSA Tumor Marker   Date Value Ref Range Status   06/06/2024 1.81 0.00 - 6.50 ng/mL Final   12/04/2023 6.09 (H) 0.00 - 4.50 ng/mL Final   04/17/2023 5.30 (H) 0.00 - 4.50 ng/mL Final           Imaging:    I personally reviewed all applicable imaging and went over findings with patient.  Significant for:    Results for orders placed or performed  during the hospital encounter of 11/16/24   CT Abdomen Pelvis w/o Contrast    Narrative    EXAM: CT ABDOMEN PELVIS W/O CONTRAST  LOCATION: [facility]  DATE: 11/16/2024    INDICATION: right flank pain.  dysuria.  concern for stone  COMPARISON: None.  TECHNIQUE: CT scan of the abdomen and pelvis was performed without IV contrast. Multiplanar reformats were obtained. Dose reduction techniques were used.  CONTRAST: None.    FINDINGS:   LOWER CHEST: Normal.    HEPATOBILIARY: Liver and gallbladder within normal limits.    PANCREAS: Normal.    SPLEEN: Normal.    ADRENAL GLANDS: Normal.    KIDNEYS/BLADDER: 4 mm stone is noted at the right ureterovesicular junction which is causing minimal right hydronephrosis and right hydroureter with perinephric stranding noted around the right kidney and right ureter. Additional 2 mm stone noted within   the right kidney. Few small nonobstructive stones noted within the left kidney measuring up to 3 mm. No left hydronephrosis.    BOWEL: Diverticulosis of the colon. No acute inflammatory change. No obstruction.     LYMPH NODES: Normal.    VASCULATURE: Mild to moderate atherosclerotic disease of the abdominal aorta and its branches.    PELVIC ORGANS: Bladder decompressed.    MUSCULOSKELETAL: Degenerative changes of spine and hips.      Impression    IMPRESSION:   1.  4 mm stone is noted at the right ureterovesicular junction which is causing minimal right hydroureter and hydronephrosis with adjacent perinephric and periureteric stranding. Additional small nonobstructive stones noted within both kidneys.              Assessment and Plan:   Assessment: Dillon Matias is a 69 year old male seen in evaluation for a 4 mm stone at the right UVJ found on CT from 11/16/2024. He is essentially asymptomatic, but has not seen the stone pass.     We will plan for a follow up CT scan in 1-2 weeks. He has a lab appointment on Monday. I will order a BMP for recheck of his  kidney function. If the stone has not passed, we would consider ureteroscopy.       Plan:  Continue tamsulosin 0.4 mg daily.  BMP with lab visit on Monday.   CT abdomen pelvis to assess for stone passage.     Tania Garrett PA-C  Department of Urology

## 2024-11-27 ENCOUNTER — VIRTUAL VISIT (OUTPATIENT)
Dept: UROLOGY | Facility: CLINIC | Age: 69
End: 2024-11-27
Attending: EMERGENCY MEDICINE
Payer: COMMERCIAL

## 2024-11-27 DIAGNOSIS — R10.31 RIGHT LOWER QUADRANT ABDOMINAL PAIN: ICD-10-CM

## 2024-11-27 DIAGNOSIS — N20.1 URETERAL STONE: ICD-10-CM

## 2024-11-27 NOTE — PROGRESS NOTES
Dillon Matias is a 69 year old who is being evaluated via telephone visit.       Distant Location (provider location):  Off-site    Phone call duration: 8 minutes

## 2024-12-02 ENCOUNTER — LAB (OUTPATIENT)
Dept: LAB | Facility: CLINIC | Age: 69
End: 2024-12-02
Payer: COMMERCIAL

## 2024-12-02 DIAGNOSIS — N20.1 URETERAL STONE: ICD-10-CM

## 2024-12-02 DIAGNOSIS — C61 PROSTATE CANCER (H): ICD-10-CM

## 2024-12-02 LAB
ANION GAP SERPL CALCULATED.3IONS-SCNC: 12 MMOL/L (ref 7–15)
BUN SERPL-MCNC: 16.8 MG/DL (ref 8–23)
CALCIUM SERPL-MCNC: 9.2 MG/DL (ref 8.8–10.4)
CHLORIDE SERPL-SCNC: 103 MMOL/L (ref 98–107)
CREAT SERPL-MCNC: 1.1 MG/DL (ref 0.67–1.17)
EGFRCR SERPLBLD CKD-EPI 2021: 72 ML/MIN/1.73M2
GLUCOSE SERPL-MCNC: 107 MG/DL (ref 70–99)
HCO3 SERPL-SCNC: 26 MMOL/L (ref 22–29)
POTASSIUM SERPL-SCNC: 3.7 MMOL/L (ref 3.4–5.3)
PSA SERPL DL<=0.01 NG/ML-MCNC: 1.73 NG/ML (ref 0–6.5)
SODIUM SERPL-SCNC: 141 MMOL/L (ref 135–145)

## 2024-12-02 PROCEDURE — 80048 BASIC METABOLIC PNL TOTAL CA: CPT

## 2024-12-02 PROCEDURE — 84153 ASSAY OF PSA TOTAL: CPT

## 2024-12-02 PROCEDURE — 36415 COLL VENOUS BLD VENIPUNCTURE: CPT

## 2024-12-06 ENCOUNTER — HOSPITAL ENCOUNTER (OUTPATIENT)
Dept: CT IMAGING | Facility: HOSPITAL | Age: 69
Discharge: HOME | End: 2024-12-06
Attending: STUDENT IN AN ORGANIZED HEALTH CARE EDUCATION/TRAINING PROGRAM | Admitting: STUDENT IN AN ORGANIZED HEALTH CARE EDUCATION/TRAINING PROGRAM
Payer: COMMERCIAL

## 2024-12-06 DIAGNOSIS — N20.1 URETERAL STONE: ICD-10-CM

## 2024-12-06 PROCEDURE — 74176 CT ABD & PELVIS W/O CONTRAST: CPT

## 2024-12-09 ENCOUNTER — TELEPHONE (OUTPATIENT)
Dept: GASTROENTEROLOGY | Facility: CLINIC | Age: 69
End: 2024-12-09
Payer: COMMERCIAL

## 2024-12-09 NOTE — TELEPHONE ENCOUNTER
Endoscopy Scheduling Screen      What insurance is in the chart?  Other:  [employer]age    Ordering/Referring Provider: Karina Ackerman   (If ordering provider performs procedure, schedule with ordering provider unless otherwise instructed. )    BMI: There is no height or weight on file to calculate BMI. 26.47    Sedation Ordered  general anesthesia.   BMI<= 45 45 < BMI <= 48 48 < BMI < = 50  BMI > 50   No Restrictions No [facility]  No [facility]  [facility] with exceptions Hospital Only OR Only       Do you have a history of malignant hyperthermia?  No    (Females) Are you currently pregnant?   No     Are you currently on dialysis?   No    Do you need assistance transferring?   No    BMI: There is no height or weight on file to calculate BMI.     Is patients BMI > 50?  No    BMI > 40?  No    Do you have a diagnosis of diabetes?  No    Do you take an injectable medication for weight loss or diabetes (excluding insulin)?  No    Do you take the medication Naltrexone?  No    Do you take blood thinners?  No     Prep   Are you currently on dialysis or do you have chronic kidney disease?  No    Do you have a diagnosis of cystic fibrosis (CF)?  No    On a regular basis do you go 3 -5 days between bowel movements?  No    Preferred Pharmacy: [email or web address]  No Pharmacies Listed    Final Scheduling Details     Procedure scheduled  Colonoscopy    Surgeon:  Natalia     Date of procedure:  1-9-25     Location  [place] - Patient preference.    What is your communication preference for Instructions and/or Bowel Prep?   [place]    Patient Reminders:    You will receive a call from a Nurse to review instructions and health history.  This assessment must be completed prior to your procedure.  Failure to complete the Nurse assessment may result in the procedure being cancelled.       On the day of your procedure, please designate an adult(s) who can drive you home stay with you for the next 24 hours. The medicines  used in the exam will make you sleepy. You will not be able to drive.       You cannot take public transportation, ride share services, or non-medical taxi service without a responsible caregiver.  Medical transport services are allowed with the requirement that a responsible caregiver will receive you at your destination.  We require that drivers and caregivers are confirmed prior to your procedure.

## 2024-12-10 ENCOUNTER — TELEPHONE (OUTPATIENT)
Dept: SURGERY | Facility: CLINIC | Age: 69
End: 2024-12-10
Payer: COMMERCIAL

## 2024-12-10 DIAGNOSIS — N20.0 NEPHROLITHIASIS: Primary | ICD-10-CM

## 2024-12-10 RX ORDER — TAMSULOSIN HYDROCHLORIDE 0.4 MG/1
0.4 CAPSULE ORAL DAILY
Qty: 14 CAPSULE | Refills: 0 | Status: SHIPPED | OUTPATIENT
Start: 2024-12-10 | End: 2024-12-24

## 2024-12-12 ENCOUNTER — TELEPHONE (OUTPATIENT)
Dept: SLEEP MEDICINE | Facility: CLINIC | Age: 69
End: 2024-12-12

## 2024-12-12 ENCOUNTER — VIRTUAL VISIT (OUTPATIENT)
Dept: SLEEP MEDICINE | Facility: CLINIC | Age: 69
End: 2024-12-12
Payer: COMMERCIAL

## 2024-12-12 VITALS — BODY MASS INDEX: 26.68 KG/M2 | HEIGHT: 67 IN | WEIGHT: 170 LBS

## 2024-12-12 DIAGNOSIS — G47.33 OSA (OBSTRUCTIVE SLEEP APNEA): Primary | ICD-10-CM

## 2024-12-12 ASSESSMENT — SLEEP AND FATIGUE QUESTIONNAIRES
HOW LIKELY ARE YOU TO NOD OFF OR FALL ASLEEP WHILE SITTING AND TALKING TO SOMEONE: WOULD NEVER DOZE
HOW LIKELY ARE YOU TO NOD OFF OR FALL ASLEEP WHILE SITTING AND READING: WOULD NEVER DOZE
HOW LIKELY ARE YOU TO NOD OFF OR FALL ASLEEP IN A CAR, WHILE STOPPED FOR A FEW MINUTES IN TRAFFIC: WOULD NEVER DOZE
HOW LIKELY ARE YOU TO NOD OFF OR FALL ASLEEP WHILE WATCHING TV: WOULD NEVER DOZE
HOW LIKELY ARE YOU TO NOD OFF OR FALL ASLEEP WHEN YOU ARE A PASSENGER IN A CAR FOR AN HOUR WITHOUT A BREAK: WOULD NEVER DOZE
HOW LIKELY ARE YOU TO NOD OFF OR FALL ASLEEP WHILE SITTING INACTIVE IN A PUBLIC PLACE: WOULD NEVER DOZE
HOW LIKELY ARE YOU TO NOD OFF OR FALL ASLEEP WHILE LYING DOWN TO REST IN THE AFTERNOON WHEN CIRCUMSTANCES PERMIT: MODERATE CHANCE OF DOZING
HOW LIKELY ARE YOU TO NOD OFF OR FALL ASLEEP WHILE SITTING QUIETLY AFTER LUNCH WITHOUT ALCOHOL: WOULD NEVER DOZE

## 2024-12-12 ASSESSMENT — PAIN SCALES - GENERAL: PAINLEVEL_OUTOF10: NO PAIN (0)

## 2024-12-12 NOTE — PATIENT INSTRUCTIONS
[facility]  Search engine: https://mms.aadsm.org/members/directory/search_bootstrap.php?org_id=ADSM&   Certified in Dental Sleep Medicine    Fer Janusz  Degree: NEIL  [address]  [address]  [place] [address]  Professional Phone: [phone]  Website: [email or web address]    Bertram Heller  Degree: NEIL  [facility]  [address]  [address]  [place] [address]  Professional Phone: [phone]: [phone]    [facility]  [address] [place] [address]  [address]  [place] [address]  Appointments: [phone]  Fax: [phone]    Yesenia Orourke  [facility]  [address]  [place] [address]  Professional Phone: [phone]  Website: [email or web address]      Matt Love   [employer]   Degree: MD ENIL   [address]  [address]  [place] [address]  Appointments: [phone]    Mohan Cota  Degree: NEIL  [address]  [address]  [place] [address]  Professional Phone: [phone]  Fax: [phone]    Dat Carreno  Degree: NEIL  [facility]  [address]  [address]  [place] [address]  Professional Phone: [phone]  Website: [email or web address]      Elsa Rodriguez  [facility]-you should verify insurance coverage  [address]  [address]  [place] [address]  Professional Phone: [phone]  Website: [email or web address]      Rosa Muniz--DOES NOT ACCEPT INSURANCE  Degree: NEIL--you should verify insurance coverage  [facility]  [address]  [address]  [place] [address]  Professional Phone: [phone]    María Combs  Degree: NEIL, PhD  [facility]  [address] [place] [address]   Appointments: [phone]   Fax: [phone]     Bryan  [facility]  Degree: DDS  [address] [place] [address]  Professional Phone: [phone]  Fax: [phone]  Website: [email or web address]      Wilner Bee  Degree: DDS  [facility]  [address] [place] [address]    Mariona Mulet Pradera  Degree: DDS, MS  [facility]  [address] [place] [address]  Professional Phone: [phone]      Hailey Stanley  Degree: DDS, MS  [facility]  [address]  [address]  [place] [address]  Professional Phone: [phone]    Eloisa Zhong  Degree: LYNETTES  [facility]  [address]  [place] [address]  Appointments: [phone]    Magan Conway  Degree: DDS  [facility]  [address] [place] [address]  Professional Phone: [phone]  Website: [email or web address]      Ja Mcpherson  Degree: DDS  [facility]  [address] [place] [address]  Professional Phone: [phone]  Fax: [phone]      Freddie Becker  Degree: DDS  [facility]  [address]  [address]  [place] [address]    Cory Herbert   Degree: DDS   [facility]   [address]   [address]  [place] [address]  Phone [phone]  Website: [email or web address]    Aida Felix   Degree: DDS   [address]  [address]  [place] [address]  Appointments: [phone]    Erika Coleman   Degree: DDS   [address] [place] [address]   Appointments: [phone]    Ryan Abrams   Degree: DDS   [address] [place] [address]   Appointments: [phone]    Michael Sears   Degree: DDS   [address] [place] [address]   Appointments: [phone]    Sancho Orlando   Degree: NEIL [facility], [address] [address]   Appointments: [phone]    Allyson Floyd   Degree: NEIL  [address] [place] [address]  [place] [address]  Appointments: [phone]    Ryan Carrell   [address] [address]  Appointments: [phone]    Yoko Peters   Degree: DDS   [facility]   [address] [place] [address]   Appointments: [phone]    Singh Spencer   Degree: NEIL   [address] [place] [address]   Appointments: [phone]    [facility]    Degree: NEIL  [address]  [address]  [place] [address]  Appointments: [phone]    Deion Thibodeaux   Degree: NEIL   [facility]   [address]  [address]  [place] [address]  Appointment: [phone]    Milena Girl   Degree: MS MARISSA, FAAERNESTO   [facility]  [address]   [address]  [place] [address]  Appointments: [phone]    Matt Valiente   Degree: NEIL   [address]   [place] [address]   Appointments: [phone]   Fax: [phone]        ACCEPT MEDICARE  Peterson Horner DDS  [address], [place] [address]  [phone]; [phone] (fax)  [employer]    Get Robin DDS, MS   [facility] Professional [place] [address]   [address]   [place] [address]   Appointments: [phone]   Fax: [phone]     Hira Kinsey   Degree: DMD, MSD   [facility]   [address]  [address]  [place] [address]  Appointments: [phone]  Fax: [phone]      ADDITIONAL PROVIDERS    Pa Rodriguez DDS    [facility]  [address] [place] [address]  Appointments: [phone]     [facility]  [address]  [address]  [place] [address]  Appointments: [phone]  Fax- [phone]    Sara Dumont   Degree: DDS   [facility]    [address]  [place] [address]  Appointments: [phone]

## 2024-12-12 NOTE — PROGRESS NOTES
Virtual Visit Details  Type of service: Video Visit   Video Start Time: 8:53 AM  Video End Time: 8:58 AM  Originating Location (pt. Location): Home  Distant Location (provider location): Off-site  Platform used for Video Visit: [facility]    Sleep Apnea - Follow-up Visit:    Impression/Plan:  (G47.33) EVANGELISTA (obstructive sleep apnea) (primary encounter diagnosis)  Comment: Dillon Matias presents for follow-up of his mild sleep apnea, currently untreated. He initially tried positional therapy. He is now interested in an oral appliance. He did go to the [facility] but at the time, he was getting partial dentures made. He would like to return to the clinic to see if he is a good candidate for an oral appliance now that he has his partials made.   Plan: Sleep Dental Referral    Dillon Matias will follow up in the future if he would like to complete a repeat home sleep study to confirm efficacy of his oral appliance.     Total time spent reviewing medical records, history and physical examination, review of previous testing and interpretation as well as documentation on this date: 11 minutes     CC: Antonio Hernandez PA-C     History of Present Illness:  Chief Complaint   Patient presents with    RECHECK     Dillon Matias presents for follow-up of his mild sleep apnea, currently untreated. He initially tried positional therapy. He is now interested in an oral appliance. He did go to the [facility] but at the time, he was getting partial dentures made. He would like to go back to see if he is a good candidate for an oral appliance now that he has his partials made.     HST on 05/03/2023 at 167 lbs.- AHI 6.5 events per hour. No sleep associated hypoxemia.    Do you use a CPAP Machine at home: No    EPWORTH SLEEPINESS SCALE         12/12/2024     8:43 AM    [place] Sleepiness Scale ( DILAN Hernandez  1547-0950<br>ESS - [place]/English - Final version - 21 Nov 07 - [facility] Research  [place].)   Sitting and reading Would never doze   Watching TV Would never doze   Sitting, inactive in a public place (e.g. a theatre or a meeting) Would never doze   As a passenger in a car for an hour without a break Would never doze   Lying down to rest in the afternoon when circumstances permit Moderate chance of dozing   Sitting and talking to someone Would never doze   Sitting quietly after a lunch without alcohol Would never doze   In a car, while stopped for a few minutes in traffic Would never doze   [place] Score (MC) 2   [place] Score (Sleep) 2        Patient-reported       INSOMNIA SEVERITY INDEX (ROCK)          12/12/2024     8:42 AM   Insomnia Severity Index (ROCK)   Difficulty falling asleep 0    Difficulty staying asleep 1    Problems waking up too early 1    How SATISFIED/DISSATISFIED are you with your CURRENT sleep pattern? 3    How NOTICEABLE to others do you think your sleep problem is in terms of impairing the quality of your life? 4    How WORRIED/DISTRESSED are you about your current sleep problem? 3    To what extent do you consider your sleep problem to INTERFERE with your daily functioning (e.g. daytime fatigue, mood, ability to function at work/daily chores, concentration, memory, mood, etc.) CURRENTLY? 1    ROCK Total Score 13        Patient-reported       Guidelines for Scoring/Interpretation:  Total score categories:  0-7 = No clinically significant insomnia   8-14 = Subthreshold insomnia   15-21 = Clinical insomnia (moderate severity)  22-28 = Clinical insomnia (severe)  Used via courtesy of www.[facility] Cent[facility].va.gov with permission from Jamar Blount PhD., [facility]      Past medical/surgical history, family history, social history, medications and allergies were reviewed.        Problem List:  Patient Active Problem List    Diagnosis Date Noted    Aortic ectasia (H) 01/29/2024     Priority: Medium    Prostate cancer (H) 01/16/2024     Priority: Medium    Obstructive sleep apnea of  "adult 08/16/2023     Priority: Medium    Actinic keratosis 02/07/2020     Priority: Medium    Other eczema 02/07/2020     Priority: Medium    Coronary artery disease involving native coronary artery of native heart without angina pectoris 06/15/2015     Priority: Medium    Dyslipidemia 06/10/2015     Priority: Medium    Tobacco abuse 06/10/2015     Priority: Medium    Erectile dysfunction 06/10/2015     Priority: Medium    Hyperlipidemia 11/16/2005     Priority: Medium     Formatting of this note might be different from the original.   LW Onset:  16Nov05          Ht 1.702 m (5' 7\")   Wt 77.1 kg (170 lb)   BMI 26.63 kg/m      Palomo Guerra, APRN CNP  "

## 2024-12-12 NOTE — NURSING NOTE
Current patient location: [address] [facility] [address]    Is the patient currently in the state of [place]? YES    Visit mode:VIDEO    If the visit is dropped, the patient can be reconnected by:VIDEO VISIT: Text to cell phone:   Telephone Information:   Mobile [phone]       Will anyone else be joining the visit? NO  (If patient encounters technical issues they should call [phone])    Are changes needed to the allergy or medication list? No    Are refills needed on medications prescribed by this physician? NO    Rooming Documentation:  VF Required questionnaires complete    Reason for visit: RECHECK    Awa FRANKF

## 2024-12-12 NOTE — TELEPHONE ENCOUNTER
Order/Referral Request    Who is requesting: SELF    Orders being requested: oral appliance    Reason service is needed/diagnosis: sleep     When are orders needed by: asap    Has this been discussed with Provider: Yes    Does patient have a preference on a Group/Provider/Facility? [facility] in [place]  Fax [phone]    Does patient have an appointment scheduled?: No    Where to send orders: Fax    Could we send this information to you in [facility] or would you prefer to receive a phone call?:   Patient would prefer a phone call   Okay to leave a detailed message?: Yes at Home number on file [phone] (home)

## 2024-12-13 ENCOUNTER — HOSPITAL ENCOUNTER (OUTPATIENT)
Facility: CLINIC | Age: 69
End: 2024-12-13
Attending: STUDENT IN AN ORGANIZED HEALTH CARE EDUCATION/TRAINING PROGRAM | Admitting: STUDENT IN AN ORGANIZED HEALTH CARE EDUCATION/TRAINING PROGRAM
Payer: COMMERCIAL

## 2024-12-14 ENCOUNTER — NURSE TRIAGE (OUTPATIENT)
Dept: NURSING | Facility: CLINIC | Age: 69
End: 2024-12-14

## 2024-12-14 PROCEDURE — 99000 SPECIMEN HANDLING OFFICE-LAB: CPT | Performed by: NURSE PRACTITIONER

## 2024-12-14 PROCEDURE — 82365 CALCULUS SPECTROSCOPY: CPT | Mod: 90 | Performed by: NURSE PRACTITIONER

## 2024-12-14 NOTE — TELEPHONE ENCOUNTER
"Pt asks how to store a kidney stone which has been passed.  Advised pt to await callback after checking with lab staff.  Info conveyed as follows:  - lab order is needed first to process the sample  - order would be called \"Renal Calculus Analysis\"  - place stone on a paper towel and air dry thoroughly at room temp for 24 hours  - then place stone in the container provided  - bring to lab     Pt still needs lab order placed.  Decision is therefore to keep stone at home (air dried at room temp and placed in container provided) until Monday.  Pt will then call the [facility] [place] Monday morning to request orders for analysis.  Once orders are placed, pt will drop off stone at [facility] on his way to his pre-op appt at [facility].  Pt verbalizes clear understanding.  No further questions at this time.    Marietta Robin RN  [facility] Nurse Advisor     Reason for Disposition   [1] Follow-up call to recent contact AND [2] information only call, no triage required    Protocols used: Information Only Call - No Triage-A-AH    "

## 2024-12-16 ENCOUNTER — OFFICE VISIT (OUTPATIENT)
Dept: FAMILY MEDICINE | Facility: CLINIC | Age: 69
End: 2024-12-16
Payer: COMMERCIAL

## 2024-12-16 ENCOUNTER — TELEPHONE (OUTPATIENT)
Dept: UROLOGY | Facility: CLINIC | Age: 69
End: 2024-12-16

## 2024-12-16 VITALS
SYSTOLIC BLOOD PRESSURE: 118 MMHG | TEMPERATURE: 96.9 F | OXYGEN SATURATION: 96 % | HEIGHT: 67 IN | DIASTOLIC BLOOD PRESSURE: 84 MMHG | WEIGHT: 171.6 LBS | RESPIRATION RATE: 16 BRPM | BODY MASS INDEX: 26.93 KG/M2 | HEART RATE: 64 BPM

## 2024-12-16 DIAGNOSIS — N20.1 URETERAL STONE: ICD-10-CM

## 2024-12-16 DIAGNOSIS — I25.10 CORONARY ARTERY DISEASE INVOLVING NATIVE CORONARY ARTERY OF NATIVE HEART WITHOUT ANGINA PECTORIS: ICD-10-CM

## 2024-12-16 DIAGNOSIS — Z01.818 PREOP GENERAL PHYSICAL EXAM: Primary | ICD-10-CM

## 2024-12-16 DIAGNOSIS — N20.1 URETERAL STONE: Primary | ICD-10-CM

## 2024-12-16 PROCEDURE — 99213 OFFICE O/P EST LOW 20 MIN: CPT | Performed by: NURSE PRACTITIONER

## 2024-12-16 PROCEDURE — 93000 ELECTROCARDIOGRAM COMPLETE: CPT | Performed by: NURSE PRACTITIONER

## 2024-12-16 NOTE — TELEPHONE ENCOUNTER
Pt called and stated he passed a stone and would like to drop it off at a lab. Orders placed for stone analysis. Pt stated he will drop it off at [facility] lab.  Felicity DOS SANTOS RN BSN PHN  Specialty Clinics

## 2024-12-16 NOTE — PROGRESS NOTES
Preoperative Evaluation  [facility]  [address]  [facility] [address]  Phone: [phone]  Primary Provider: Antonio Hernandez PA-C  Pre-op Performing Provider: SRIRAM Juarez CNP  Dec 16, 2024             12/13/2024   Surgical Information   What procedure is being done? kidney stone removal    Facility or Hospital where procedure/surgery will be performed: [facility]    Who is doing the procedure / surgery? Dr. Austin    Date of surgery / procedure: 12/18/2024    Time of surgery / procedure: 9:00 arrival    Where do you plan to recover after surgery? at home with family        Patient-reported     Fax number for surgical facility: Note does not need to be faxed, will be available electronically in Epic.    Assessment & Plan     The proposed surgical procedure is considered INTERMEDIATE risk.    Preop general physical exam      Ureteral stone    - Stone analysis    Coronary artery disease involving native coronary artery of native heart without angina pectoris    - EKG 12-lead complete w/read - Clinics            - No identified additional risk factors other than previously addressed    Preoperative Medication Instructions  Antiplatelet or Anticoagulation Medication Instructions   - aspirin: Discontinue aspirin 7-10 days prior to procedure to reduce bleeding risk. It should be resumed postoperatively.     Additional Medication Instructions  Take all scheduled medications on the day of surgery EXCEPT for modifications listed below:   - Statins: Continue taking on the day of surgery.    - Herbal medications and vitamins: DO NOT TAKE 14 days prior to surgery.    Recommendation  Approval given to proceed with proposed procedure, without further diagnostic evaluation.    Zaria Carranza is a 69 year old, presenting for the following:  Pre-Op Exam          12/16/2024     2:29 PM   Additional Questions   Roomed by Yadira GARRETT related to upcoming  procedure: 4 mm stone in the right ureterovesical junction with no significant associated hydroureter/hydronephrosis. Multiple small intrarenal stones are also noted. No left ureteral stone or hydronephrosis.         12/13/2024   Pre-Op Questionnaire   Have you ever had a heart attack or stroke? (!) YES     Have you ever had surgery on your heart or blood vessels, such as a stent placement, a coronary artery bypass, or surgery on an artery in your head, neck, heart, or legs? (!) YES     Do you have chest pain with activity? No    Do you have a history of heart failure? No    Do you currently have a cold, bronchitis or symptoms of other infection? No    Do you have a cough, shortness of breath, or wheezing? No    Do you or anyone in your family have previous history of blood clots? (!) UNKNOWN     Do you or does anyone in your family have a serious bleeding problem such as prolonged bleeding following surgeries or cuts? (!) UNKNOWN     Have you ever had problems with anemia or been told to take iron pills? No    Have you had any abnormal blood loss such as black, tarry or bloody stools? No    Have you ever had a blood transfusion? (!) UNKNOWN    Are you willing to have a blood transfusion if it is medically needed before, during, or after your surgery? Yes    Have you or any of your relatives ever had problems with anesthesia? No    Do you have sleep apnea, excessive snoring or daytime drowsiness? (!) YES    Do you have a CPAP machine? (!) NO     Do you have any artifical heart valves or other implanted medical devices like a pacemaker, defibrillator, or continuous glucose monitor? No    Do you have artificial joints? No    Are you allergic to latex? No        Patient-reported     Health Care Directive  Patient has a Health Care Directive on file      Preoperative Review of    reviewed - no record of controlled substances prescribed.    PDMP Review         Value Time User    State PDMP site checked  Yes  12/16/2024  3:16 PM Brigitte Berg APRN CNP              Status of Chronic Conditions:  CAD - Patient has a longstanding history of moderate-severe CAD. Patient denies recent chest pain or NTG use, denies exercise induced dyspnea or PND. Last Stress test 04/2024, EKG 04/2023.     Patient Active Problem List    Diagnosis Date Noted    Aortic ectasia (H) 01/29/2024     Priority: Medium    Prostate cancer (H) 01/16/2024     Priority: Medium    Obstructive sleep apnea of adult 08/16/2023     Priority: Medium    Actinic keratosis 02/07/2020     Priority: Medium    Other eczema 02/07/2020     Priority: Medium    Coronary artery disease involving native coronary artery of native heart without angina pectoris 06/15/2015     Priority: Medium    Dyslipidemia 06/10/2015     Priority: Medium    Tobacco abuse 06/10/2015     Priority: Medium    Erectile dysfunction 06/10/2015     Priority: Medium    Hyperlipidemia 11/16/2005     Priority: Medium     Formatting of this note might be different from the original.   LW Onset:  [id number]        Past Medical History:   Diagnosis Date    Actinic keratosis     Basal cell carcinoma     Heart disease 07/2015     Past Surgical History:   Procedure Laterality Date    COLONOSCOPY      DAVINCI XI HERNIORRHAPHY INGUINAL Right 5/30/2024    Procedure: RIGHT HERNIORRHAPHY, INGUINAL, ROBOT-ASSISTED, LAPAROSCOPIC, USING DA ANNA XI;  Surgeon: Bryan Alex MD;  Location: [facility] OR    INSERT SEED MARKER / MATRIX N/A 9/8/2023    Procedure: Transrectal Ultrasound Guided Placement of Fiducials and SpaceOar;  Surgeon: Michael Almendarez MD;  Location: [facility]    ORTHOPEDIC SURGERY  08/2010    RIGHT KNEE     Current Outpatient Medications   Medication Sig Dispense Refill    acetaminophen (TYLENOL) 325 MG tablet Take 2 tablets (650 mg) by mouth every 4 hours as needed for mild pain 50 tablet 0    aspirin (ASA) 81 MG chewable tablet Take 1 tablet (81 mg) by mouth daily 90 tablet 3    atorvastatin  "(LIPITOR) 80 MG tablet Take 1 tablet (80 mg) by mouth every evening 90 tablet 3    Multiple Vitamin (MULTIVITAMIN ADULT PO) Take 1 tablet by mouth every morning      sildenafil (VIAGRA) 100 MG tablet Take 1 tablet (100 mg) by mouth daily as needed (take one tablet orally 30 minutes prior to initiating sexual intercourse.). 30 tablet 0    tamsulosin (FLOMAX) 0.4 MG capsule Take 1 capsule (0.4 mg) by mouth daily for 14 days. Start taking 1 week before kidney stone surgery 14 capsule 0    triamcinolone (KENALOG) 0.1 % external cream Apply to affected area on the leg twice a day for 1-2 weeks, then as needed only 454 g 2    tamsulosin (FLOMAX) 0.4 MG capsule Take 1 capsule (0.4 mg) by mouth daily. 30 capsule 0       No Known Allergies     Social History     Tobacco Use    Smoking status: Former     Current packs/day: 0.00     Average packs/day: 1 pack/day for 45.1 years (45.1 ttl pk-yrs)     Types: Cigarettes     Start date: 1970     Quit date: 7/15/2015     Years since quittin.4     Passive exposure: Past    Smokeless tobacco: Never   Substance Use Topics    Alcohol use: Not Currently       History   Drug Use Unknown             Review of Systems  Constitutional, HEENT, cardiovascular, pulmonary, gi and gu systems are negative, except as otherwise noted.    Objective    /84   Pulse 64   Temp 96.9  F (36.1  C) (Tympanic)   Resp 16   Ht 1.702 m (5' 7\")   Wt 77.8 kg (171 lb 9.6 oz)   SpO2 96%   BMI 26.88 kg/m     Estimated body mass index is 26.88 kg/m  as calculated from the following:    Height as of this encounter: 1.702 m (5' 7\").    Weight as of this encounter: 77.8 kg (171 lb 9.6 oz).  Physical Exam  GENERAL: alert and no distress  EYES: Eyes grossly normal to inspection and conjunctivae and sclerae normal  HENT: normal cephalic/atraumatic, oropharynx clear, and oral mucous membranes moist  NECK: no adenopathy, no asymmetry, masses, or scars  RESP: lungs clear to auscultation - no rales, rhonchi " or wheezes  CV: regular rate and rhythm, normal S1 S2, no S3 or S4, no murmur, click or rub, no peripheral edema  ABDOMEN: soft, nontender  MS: no gross musculoskeletal defects noted, no edema  SKIN: no suspicious lesions or rashes  NEURO: Normal strength and tone, mentation intact and speech normal    Recent Labs   Lab Test 12/02/24  0847 11/16/24  1631 05/13/24  0919   HGB  --  14.7 14.7   PLT  --  184 167    139 141   POTASSIUM 3.7 4.2 4.1   CR 1.10 1.44* 0.89        Diagnostics  No labs were ordered during this visit.   EKG: appears normal, NSR- EKG machine in clinic today is not fully working, Was not able to get full EKG and not able to save to his chart.          Media Information      Document Information    Other: Photograph   EKG   12/16/2024 3:23 PM   Attached To:   Office Visit on 12/16/24 with Brigitte Berg APRN CNP   Source Information    Brigitte Berg APRN CNP   Family Practice   Document History        Revised Cardiac Risk Index (RCRI)  The patient has the following serious cardiovascular risks for perioperative complications:   - Coronary Artery Disease (MI, positive stress test, angina, Qs on EKG) = 1 point     RCRI Interpretation: 1 point: Class II (low risk - 0.9% complication rate)         Signed Electronically by: SRIRAM Juarez CNP  A copy of this evaluation report is provided to the requesting physician.

## 2024-12-16 NOTE — TELEPHONE ENCOUNTER
Reason for Call:  Other call back    Detailed comments: Pt passed a small kidney stone on Sat and is wondering if there is a certain lab it should be dropped off at or does he need to bring it to [place] lab.     Phone Number Patient can be reached at: Cell number on file:    Telephone Information:   Mobile [phone]       Best Time:       Can we leave a detailed message on this number? YES    Call taken on 12/16/2024 at 8:07 AM by Dory Noriega MA

## 2024-12-16 NOTE — PATIENT INSTRUCTIONS
How to Take Your Medication Before Surgery  Preoperative Medication Instructions   Antiplatelet or Anticoagulation Medication Instructions   - aspirin: Discontinue aspirin 7-10 days prior to procedure to reduce bleeding risk. It should be resumed postoperatively.     Additional Medication Instructions  Take all scheduled medications on the day of surgery EXCEPT for modifications listed below:   - Statins: Continue taking on the day of surgery.    - Herbal medications and vitamins: DO NOT TAKE 14 days prior to surgery.       Patient Education   Preparing for Your Surgery  For Adults  Getting started  In most cases, a nurse will call to review your health history and instructions. They will give you an arrival time based on your scheduled surgery time. Please be ready to share:  Your doctor's clinic name and phone number  Your medical, surgical, and anesthesia history  A list of allergies and sensitivities  A list of medicines, including herbal treatments and over-the-counter drugs  Whether the patient has a legal guardian (ask how to send us the papers in advance)  Note: You may not receive a call if you were seen at our PAC (Preoperative Assessment Center).  Please tell us if you're pregnant--or if there's any chance you might be pregnant. Some surgeries may injure a fetus (unborn baby), so they require a pregnancy test. Surgeries that are safe for a fetus don't always need a test, and you can choose whether to have one.   Preparing for surgery  Within 10 to 30 days of surgery: Have a pre-op exam (sometimes called an H&P, or History and Physical). This can be done at a clinic or pre-operative center.  If you're having a , you may not need this exam. Talk to your care team.  At your pre-op exam, talk to your care team about all medicines you take. (This includes CBD oil and any drugs, such as THC, marijuana, and other forms of cannabis.) If you need to stop any medicine before surgery, ask when to start  taking it again.  This is for your safety. Many medicines and drugs can make you bleed too much during surgery. Some change how well surgery (anesthesia) drugs work.  Call your insurance company to let them know you're having surgery. (If you don't have insurance, call [phone].)  Call your clinic if there's any change in your health. This includes a scrape or scratch near the surgery site, or any signs of a cold (sore throat, runny nose, cough, rash, fever).  Eating and drinking guidelines  For your safety: Unless your surgeon tells you otherwise, follow the guidelines below.  Eat and drink as normal until 8 hours before you arrive for surgery. After that, no food or milk. You can spit out gum when you arrive.  Drink clear liquids until 2 hours before you arrive. These are liquids you can see through, like water, Gatorade, and Propel Water. They also include plain black coffee and tea (no cream or milk).  No alcohol for 24 hours before you arrive. The night before surgery, stop any drinks that contain THC.  If your care team tells you to take medicine on the morning of surgery, it's okay to take it with a sip of water. No other medicines or drugs are allowed (including CBD oil)--follow your care team's instructions.  If you have questions the day of surgery, call your hospital or surgery center.   Preventing infection  Shower or bathe the night before and the morning of surgery. Follow the instructions your clinic gave you. (If no instructions, use regular soap.)  Don't shave or clip hair near your surgery site. We'll remove the hair if needed.  Don't smoke or vape the morning of surgery. No chewing tobacco for 6 hours before you arrive. A nicotine patch is okay. You may spit out nicotine gum when you arrive.  For some surgeries, the surgeon will tell you to fully quit smoking and nicotine.  We will make every effort to keep you safe from infection. We will:  Clean our hands often with soap and water (or an  alcohol-based hand rub).  Clean the skin at your surgery site with a special soap that kills germs.  Give you a special gown to keep you warm. (Cold raises the risk of infection.)  Wear hair covers, masks, gowns, and gloves during surgery.  Give antibiotic medicine, if prescribed. Not all surgeries need this medicine.  What to bring on the day of surgery  Photo ID and insurance card  Copy of your health care directive, if you have one  Glasses and hearing aids (bring cases)  You can't wear contacts during surgery  Inhaler and eye drops, if you use them (tell us about these when you arrive)  CPAP machine or breathing device, if you use them  A few personal items, if spending the night  If you have . . .  A pacemaker, ICD (cardiac defibrillator), or other implant: Bring the ID card.  An implanted stimulator: Bring the remote control.  A legal guardian: Bring a copy of the certified (court-stamped) guardianship papers.  Please remove any jewelry, including body piercings. Leave jewelry and other valuables at home.  If you're going home the day of surgery  You must have a responsible adult drive you home. They should stay with you overnight as well.  If you don't have someone to stay with you, and you aren't safe to go home alone, we may keep you overnight. Insurance often won't pay for this.  After surgery  If it's hard to control your pain or you need more pain medicine, please call your surgeon's office.  Questions?   If you have any questions for your care team, list them here:   ____________________________________________________________________________________________________________________________________________________________________________________________________________________________________________________________  For informational purposes only. Not to replace the advice of your health care provider. Copyright   2003, 2019 [facility]. All rights reserved. Clinically reviewed by Magan  MD Tammi. Algramo [id number] - REV 08/24.

## 2024-12-17 ENCOUNTER — TELEPHONE (OUTPATIENT)
Dept: SURGERY | Facility: CLINIC | Age: 69
End: 2024-12-17
Payer: COMMERCIAL

## 2024-12-17 NOTE — TELEPHONE ENCOUNTER
Pt passed stone  Will cancel surgery  Will do a follow-up in 6-8 weeks to review stone analysis and discuss stone prevention

## 2024-12-18 ENCOUNTER — MYC MEDICAL ADVICE (OUTPATIENT)
Dept: SLEEP MEDICINE | Facility: CLINIC | Age: 69
End: 2024-12-18
Payer: COMMERCIAL

## 2025-01-02 ENCOUNTER — ANESTHESIA EVENT (OUTPATIENT)
Dept: GASTROENTEROLOGY | Facility: CLINIC | Age: 70
End: 2025-01-02
Payer: COMMERCIAL

## 2025-01-02 ASSESSMENT — LIFESTYLE VARIABLES: TOBACCO_USE: 1

## 2025-01-02 NOTE — ANESTHESIA PREPROCEDURE EVALUATION
Anesthesia Pre-Procedure Evaluation    Patient: Dillon Matias   MRN: [id number] : 1955        Procedure : Procedure(s):  Colonoscopy          Past Medical History:   Diagnosis Date     Actinic keratosis      Basal cell carcinoma      Heart disease 2015      Past Surgical History:   Procedure Laterality Date     COLONOSCOPY       DAVINCI XI HERNIORRHAPHY INGUINAL Right 2024    Procedure: RIGHT HERNIORRHAPHY, INGUINAL, ROBOT-ASSISTED, LAPAROSCOPIC, USING DA ANAN XI;  Surgeon: Bryan Alex MD;  Location: [facility] OR     INSERT SEED MARKER / MATRIX N/A 2023    Procedure: Transrectal Ultrasound Guided Placement of Fiducials and SpaceOar;  Surgeon: Michael Almendarez MD;  Location: [facility]     ORTHOPEDIC SURGERY  2010    RIGHT KNEE      No Known Allergies   Social History     Tobacco Use     Smoking status: Former     Current packs/day: 0.00     Average packs/day: 1 pack/day for 45.1 years (45.1 ttl pk-yrs)     Types: Cigarettes     Start date: 1970     Quit date: 7/15/2015     Years since quittin.4     Passive exposure: Past     Smokeless tobacco: Never   Substance Use Topics     Alcohol use: Not Currently      Wt Readings from Last 1 Encounters:   24 77.8 kg (171 lb 9.6 oz)        Anesthesia Evaluation   Pt has had prior anesthetic. Type: MAC, General and Regional.        ROS/MED HX  ENT/Pulmonary:     (+) sleep apnea,               tobacco use, Past use,  45  Pack-Year Hx,                      Neurologic:  - neg neurologic ROS     Cardiovascular: Comment: Aortic ectasia    (+) Dyslipidemia - Peripheral Vascular Disease-  CAD -  - -                                 Previous cardiac testing   Echo: Date:  Results:  Interpretation Summary     1. The left ventricle is normal in size. Proximal septal thickening is noted.  Left ventricular systolic function is normal. The visual ejection fraction is  55-60%. Diastolic Doppler findings (E/E' ratio and/or other  parameters)  suggest left ventricular filling pressures are normal. No regional wall motion  abnormalities noted.  2. The right ventricle is normal size. The right ventricular systolic function  is normal.  3. Mild mitral and tricuspid regurgitation.  4. Aortic root dilatation is present.  5. No pericardial effusion.  6. No previous study for compariso    Stress Test:  Date: 4-2024 Results:   The nuclear stress test is negative for inducible myocardial ischemia or infarction.     Left ventricular function is normal.     The patient's exercise capacity is and stress EKG is normal.     There is no prior study for comparison.       ECG Reviewed:  Date: 4-2023 Results:  Sinus  Rhythm   WITHIN NORMAL LIMITS  Cath:  Date: Results:      METS/Exercise Tolerance:     Hematologic:  - neg hematologic  ROS     Musculoskeletal:   (+)  arthritis,             GI/Hepatic:     (+)        bowel prep,            Renal/Genitourinary:  - neg Renal ROS     Endo:  - neg endo ROS     Psychiatric/Substance Use:  - neg psychiatric ROS     Infectious Disease:  - neg infectious disease ROS     Malignancy:   (+) Malignancy, History of Skin and Prostate.Prostate CA status post Surgery.  Skin CA Remission status post Surgery.      Other:  - neg other ROS          Physical Exam    Airway        Mallampati: I   TM distance: > 3 FB   Neck ROM: full   Mouth opening: > 3 cm    Respiratory Devices and Support         Dental           Cardiovascular   cardiovascular exam normal          Pulmonary   pulmonary exam normal            OUTSIDE LABS:  CBC:   Lab Results   Component Value Date    WBC 8.0 11/16/2024    WBC 5.4 05/13/2024    HGB 14.7 11/16/2024    HGB 14.7 05/13/2024    HCT 41.4 11/16/2024    HCT 43.5 05/13/2024     11/16/2024     05/13/2024     BMP:   Lab Results   Component Value Date     12/02/2024     11/16/2024    POTASSIUM 3.7 12/02/2024    POTASSIUM 4.2 11/16/2024    CHLORIDE 103 12/02/2024    CHLORIDE 102  "11/16/2024    CO2 26 12/02/2024    CO2 27 11/16/2024    BUN 16.8 12/02/2024    BUN 18.5 11/16/2024    CR 1.10 12/02/2024    CR 1.44 (H) 11/16/2024     (H) 12/02/2024     (H) 11/16/2024     COAGS: No results found for: \"PTT\", \"INR\", \"FIBR\"  POC: No results found for: \"BGM\", \"HCG\", \"HCGS\"  HEPATIC:   Lab Results   Component Value Date    ALBUMIN 4.3 01/29/2024    PROTTOTAL 6.9 01/29/2024    ALT 27 01/29/2024    AST 26 01/29/2024    ALKPHOS 140 01/29/2024    BILITOTAL 0.7 01/29/2024     OTHER:   Lab Results   Component Value Date    SABRA 9.2 12/02/2024    SED 1 12/31/2004       Anesthesia Plan    ASA Status:  3       Anesthesia Type: General.   Induction: Propofol.   Maintenance: TIVA.        Consents    Anesthesia Plan(s) and associated risks, benefits, and realistic alternatives discussed. Questions answered and patient/representative(s) expressed understanding.     - Discussed: Risks, Benefits and Alternatives for BOTH SEDATION and the PROCEDURE were discussed     - Discussed with:  Patient            Postoperative Care    Pain management: IV analgesics, Oral pain medications, Multi-modal analgesia.   PONV prophylaxis: Ondansetron (or other 5HT-3), Dexamethasone or Solumedrol     Comments:             SRIRAM Flores CRNA    I have reviewed the pertinent notes and labs in the chart from the past 30 days and (re)examined the patient.  Any updates or changes from those notes are reflected in this note.                         # Overweight: Estimated body mass index is 26.88 kg/m  as calculated from the following:    Height as of 12/16/24: 1.702 m (5' 7\").    Weight as of 12/16/24: 77.8 kg (171 lb 9.6 oz).             "

## 2025-01-07 ENCOUNTER — PATIENT OUTREACH (OUTPATIENT)
Dept: CARE COORDINATION | Facility: CLINIC | Age: 70
End: 2025-01-07
Payer: COMMERCIAL

## 2025-01-09 ENCOUNTER — ANESTHESIA (OUTPATIENT)
Dept: GASTROENTEROLOGY | Facility: CLINIC | Age: 70
End: 2025-01-09
Payer: COMMERCIAL

## 2025-01-09 ENCOUNTER — HOSPITAL ENCOUNTER (OUTPATIENT)
Facility: CLINIC | Age: 70
Discharge: HOME | End: 2025-01-09
Attending: STUDENT IN AN ORGANIZED HEALTH CARE EDUCATION/TRAINING PROGRAM | Admitting: STUDENT IN AN ORGANIZED HEALTH CARE EDUCATION/TRAINING PROGRAM
Payer: COMMERCIAL

## 2025-01-09 VITALS
RESPIRATION RATE: 16 BRPM | TEMPERATURE: 98.2 F | OXYGEN SATURATION: 95 % | HEART RATE: 67 BPM | DIASTOLIC BLOOD PRESSURE: 79 MMHG | SYSTOLIC BLOOD PRESSURE: 106 MMHG

## 2025-01-09 LAB — COLONOSCOPY: NORMAL

## 2025-01-09 PROCEDURE — 45385 COLONOSCOPY W/LESION REMOVAL: CPT | Performed by: STUDENT IN AN ORGANIZED HEALTH CARE EDUCATION/TRAINING PROGRAM

## 2025-01-09 PROCEDURE — 370N000017 HC ANESTHESIA TECHNICAL FEE, PER MIN: Performed by: STUDENT IN AN ORGANIZED HEALTH CARE EDUCATION/TRAINING PROGRAM

## 2025-01-09 PROCEDURE — 250N000011 HC RX IP 250 OP 636: Performed by: NURSE ANESTHETIST, CERTIFIED REGISTERED

## 2025-01-09 PROCEDURE — 88305 TISSUE EXAM BY PATHOLOGIST: CPT | Mod: 26 | Performed by: PATHOLOGY

## 2025-01-09 PROCEDURE — 250N000009 HC RX 250: Performed by: NURSE ANESTHETIST, CERTIFIED REGISTERED

## 2025-01-09 PROCEDURE — 88305 TISSUE EXAM BY PATHOLOGIST: CPT | Mod: TC | Performed by: STUDENT IN AN ORGANIZED HEALTH CARE EDUCATION/TRAINING PROGRAM

## 2025-01-09 RX ORDER — ONDANSETRON 2 MG/ML
4 INJECTION INTRAMUSCULAR; INTRAVENOUS EVERY 30 MIN PRN
Status: DISCONTINUED | OUTPATIENT
Start: 2025-01-09 | End: 2025-01-09 | Stop reason: HOSPADM

## 2025-01-09 RX ORDER — LIDOCAINE 40 MG/G
CREAM TOPICAL
Status: DISCONTINUED | OUTPATIENT
Start: 2025-01-09 | End: 2025-01-09 | Stop reason: HOSPADM

## 2025-01-09 RX ORDER — PROPOFOL 10 MG/ML
INJECTION, EMULSION INTRAVENOUS PRN
Status: DISCONTINUED | OUTPATIENT
Start: 2025-01-09 | End: 2025-01-09

## 2025-01-09 RX ORDER — NALOXONE HYDROCHLORIDE 0.4 MG/ML
0.1 INJECTION, SOLUTION INTRAMUSCULAR; INTRAVENOUS; SUBCUTANEOUS
Status: DISCONTINUED | OUTPATIENT
Start: 2025-01-09 | End: 2025-01-09 | Stop reason: HOSPADM

## 2025-01-09 RX ORDER — ALBUTEROL SULFATE 0.83 MG/ML
2.5 SOLUTION RESPIRATORY (INHALATION) EVERY 4 HOURS PRN
Status: DISCONTINUED | OUTPATIENT
Start: 2025-01-09 | End: 2025-01-09 | Stop reason: HOSPADM

## 2025-01-09 RX ORDER — FLUMAZENIL 0.1 MG/ML
0.2 INJECTION, SOLUTION INTRAVENOUS
Status: DISCONTINUED | OUTPATIENT
Start: 2025-01-09 | End: 2025-01-09 | Stop reason: HOSPADM

## 2025-01-09 RX ORDER — ONDANSETRON 4 MG/1
4 TABLET, ORALLY DISINTEGRATING ORAL EVERY 30 MIN PRN
Status: DISCONTINUED | OUTPATIENT
Start: 2025-01-09 | End: 2025-01-09 | Stop reason: HOSPADM

## 2025-01-09 RX ORDER — SODIUM CHLORIDE, SODIUM LACTATE, POTASSIUM CHLORIDE, CALCIUM CHLORIDE 600; 310; 30; 20 MG/100ML; MG/100ML; MG/100ML; MG/100ML
INJECTION, SOLUTION INTRAVENOUS CONTINUOUS
Status: DISCONTINUED | OUTPATIENT
Start: 2025-01-09 | End: 2025-01-09 | Stop reason: HOSPADM

## 2025-01-09 RX ORDER — LIDOCAINE HYDROCHLORIDE 20 MG/ML
INJECTION, SOLUTION INFILTRATION; PERINEURAL PRN
Status: DISCONTINUED | OUTPATIENT
Start: 2025-01-09 | End: 2025-01-09

## 2025-01-09 RX ORDER — GLYCOPYRROLATE 0.2 MG/ML
INJECTION, SOLUTION INTRAMUSCULAR; INTRAVENOUS PRN
Status: DISCONTINUED | OUTPATIENT
Start: 2025-01-09 | End: 2025-01-09

## 2025-01-09 RX ORDER — PROPOFOL 10 MG/ML
INJECTION, EMULSION INTRAVENOUS CONTINUOUS PRN
Status: DISCONTINUED | OUTPATIENT
Start: 2025-01-09 | End: 2025-01-09

## 2025-01-09 RX ADMIN — LIDOCAINE HYDROCHLORIDE 100 MG: 20 INJECTION, SOLUTION INFILTRATION; PERINEURAL at 11:45

## 2025-01-09 RX ADMIN — PROPOFOL 200 MCG/KG/MIN: 10 INJECTION, EMULSION INTRAVENOUS at 11:45

## 2025-01-09 RX ADMIN — GLYCOPYRROLATE 0.1 MG: 0.2 INJECTION, SOLUTION INTRAMUSCULAR; INTRAVENOUS at 11:45

## 2025-01-09 RX ADMIN — PROPOFOL 100 MG: 10 INJECTION, EMULSION INTRAVENOUS at 11:45

## 2025-01-09 ASSESSMENT — ACTIVITIES OF DAILY LIVING (ADL)
ADLS_ACUITY_SCORE: 41
ADLS_ACUITY_SCORE: 41

## 2025-01-09 NOTE — ANESTHESIA CARE TRANSFER NOTE
Patient: Dillon Matias    Procedure: Procedure(s):  COLONOSCOPY, FLEXIBLE, WITH LESION REMOVAL USING SNARE       Diagnosis: Prostate cancer (H) [C61]  Encounter for screening colonoscopy [Z12.11]  Diagnosis Additional Information: No value filed.    Anesthesia Type:   General     Note:    Oropharynx: oropharynx clear of all foreign objects  Level of Consciousness: drowsy  Oxygen Supplementation: nasal cannula    Independent Airway: airway patency satisfactory and stable  Dentition: dentition unchanged  Vital Signs Stable: post-procedure vital signs reviewed and stable  Report to RN Given: handoff report given  Patient transferred to: Phase II    Handoff Report: Identifed the Patient, Identified the Reponsible Provider, Reviewed the pertinent medical history, Discussed the surgical course, Reviewed Intra-OP anesthesia mangement and issues during anesthesia, Set expectations for post-procedure period and Allowed opportunity for questions and acknowledgement of understanding      Vitals:  Vitals Value Taken Time   BP     Temp     Pulse     Resp     SpO2         Electronically Signed By: SRIRAM Gutierrez CRNA  January 9, 2025  12:05 PM

## 2025-01-09 NOTE — H&P
[facility]    Pre-Endoscopy History and Physical     Dillon Matias MRN# [id number]   YOB: 1955 Age: 69 year old     Date of Procedure: 1/9/2025  Primary care provider: Antonio Hernandez  Type of Endoscopy: Colonoscopy with possible biopsy, possible polypectomy  Reason for Procedure: screening  Type of Anesthesia Anticipated: Conscious Sedation    HPI:    Dillon is a 69 year old male who will be undergoing the above procedure.      A history and physical has been performed. The patient's medications and allergies have been reviewed. The risks and benefits of the procedure and the sedation options and risks were discussed with the patient.  All questions were answered and informed consent was obtained.      He denies a personal or family history of anesthesia complications or bleeding disorders.     Colonoscopy, no AC. Screening. Last one 2018. ASA II. Surgical hx of inguinal hernia repair. No family hx of colon cancer.     Patient Active Problem List   Diagnosis    Coronary artery disease involving native coronary artery of native heart without angina pectoris    Dyslipidemia    Actinic keratosis    Tobacco abuse    Other eczema    Prostate cancer (H)    Aortic ectasia    Erectile dysfunction    Hyperlipidemia    Obstructive sleep apnea of adult        Past Medical History:   Diagnosis Date    Actinic keratosis     Basal cell carcinoma     Heart disease 07/2015        Past Surgical History:   Procedure Laterality Date    COLONOSCOPY      DAVINCI XI HERNIORRHAPHY INGUINAL Right 5/30/2024    Procedure: RIGHT HERNIORRHAPHY, INGUINAL, ROBOT-ASSISTED, LAPAROSCOPIC, USING DA ANNA XI;  Surgeon: Bryan Alex MD;  Location: [facility] OR    INSERT SEED MARKER / MATRIX N/A 9/8/2023    Procedure: Transrectal Ultrasound Guided Placement of Fiducials and SpaceOar;  Surgeon: Michael Almendarez MD;  Location:  OR    ORTHOPEDIC SURGERY  08/2010    RIGHT KNEE       Social History     Tobacco  "Use    Smoking status: Former     Current packs/day: 0.00     Average packs/day: 1 pack/day for 45.1 years (45.1 ttl pk-yrs)     Types: Cigarettes     Start date: 1970     Quit date: 7/15/2015     Years since quittin.4     Passive exposure: Past    Smokeless tobacco: Never   Substance Use Topics    Alcohol use: Not Currently       Family History   Problem Relation Age of Onset    Anesthesia Reaction No family hx of     Venous thrombosis No family hx of        Prior to Admission medications    Medication Sig Start Date End Date Taking? Authorizing Provider   aspirin (ASA) 81 MG chewable tablet Take 1 tablet (81 mg) by mouth daily 20  Yes Antonio Hernandez PA-C   Multiple Vitamin (MULTIVITAMIN ADULT PO) Take 1 tablet by mouth every morning   Yes Reported, Patient   acetaminophen (TYLENOL) 325 MG tablet Take 2 tablets (650 mg) by mouth every 4 hours as needed for mild pain 23   Singh Loyola MD   atorvastatin (LIPITOR) 80 MG tablet Take 1 tablet (80 mg) by mouth every evening 24   Antonio Hernandez PA-C   sildenafil (VIAGRA) 100 MG tablet Take 1 tablet (100 mg) by mouth daily as needed (take one tablet orally 30 minutes prior to initiating sexual intercourse.). 24   Karina Ackerman PA-C   triamcinolone (KENALOG) 0.1 % external cream Apply to affected area on the leg twice a day for 1-2 weeks, then as needed only 24   Herrera Paul MD       No Known Allergies     REVIEW OF SYSTEMS:   5 point ROS negative except as noted above in HPI, including Gen., Resp., CV, GI &  system review.    PHYSICAL EXAM:   There were no vitals taken for this visit. Estimated body mass index is 26.88 kg/m  as calculated from the following:    Height as of 24: 1.702 m (5' 7\").    Weight as of 24: 77.8 kg (171 lb 9.6 oz).   Constitutional: Awake, alert, no acute distress.  Eyes: No scleral icterus.  Conjunctiva are without injection.  ENMT: Mucous membranes moist, dentition and " gums are intact.   Neck: Soft, supple, trachea midline.    Endocrine: n/a   Lymphatic: There is no cervical, submandibularadenopathy.  Respiratory: normal efforts on room air  Cardiovascular: extremities warm and well perfused  Abdomen: Non-distended, non-tender,  No masses,  Musculoskeletal: Full range of motion in the upper and lower extremities.    Skin: No skin rashes or lesions to inspection.  No petechia.    Neurologic: alerted and oriented 3x  Psychiatric: The patient's affect is not blunted and mood is appropriate.  DIAGNOSTICS:    Not indicated    IMPRESSION   ASA Class 2 - Mild systemic disease    PLAN:   Plan for Colonoscopy with possible biopsy, possible polypectomy. We discussed the risks, benefits and alternatives and the patient wished to proceed.  Patient is cleared for the above procedure.    The above has been forwarded to the consulting provider.    Karan Fisher MD on 1/9/2025 at 10:36 AM  [facility]

## 2025-01-09 NOTE — ANESTHESIA POSTPROCEDURE EVALUATION
Patient: Dillon Matias    Procedure: Procedure(s):  COLONOSCOPY, FLEXIBLE, WITH LESION REMOVAL USING SNARE       Anesthesia Type:  General    Note:  Disposition: Outpatient   Postop Pain Control: Uneventful            Sign Out: Well controlled pain   PONV: No   Neuro/Psych: Uneventful            Sign Out: Acceptable/Baseline neuro status   Airway/Respiratory: Uneventful            Sign Out: Acceptable/Baseline resp. status   CV/Hemodynamics: Uneventful            Sign Out: Acceptable CV status; No obvious hypovolemia; No obvious fluid overload   Other NRE: NONE   DID A NON-ROUTINE EVENT OCCUR? No           Last vitals:  Vitals:    01/09/25 1030   Resp: 16   Temp: 36.8  C (98.2  F)   SpO2: 97%       Electronically Signed By: SRIRAM Gutierrez CRNA  January 9, 2025  12:06 PM

## 2025-01-13 LAB
PATH REPORT.COMMENTS IMP SPEC: NORMAL
PATH REPORT.COMMENTS IMP SPEC: NORMAL
PATH REPORT.FINAL DX SPEC: NORMAL
PATH REPORT.GROSS SPEC: NORMAL
PATH REPORT.MICROSCOPIC SPEC OTHER STN: NORMAL
PATH REPORT.RELEVANT HX SPEC: NORMAL
PHOTO IMAGE: NORMAL

## 2025-01-23 PROBLEM — D12.6 COLON ADENOMA: Status: ACTIVE | Noted: 2025-01-23

## 2025-01-30 ENCOUNTER — CARE COORDINATION (OUTPATIENT)
Dept: SLEEP MEDICINE | Facility: CLINIC | Age: 70
End: 2025-01-30
Payer: COMMERCIAL

## 2025-01-30 NOTE — PROGRESS NOTES
Received fax from [facility] for oral appliance therapy. Given to provider for review.        RISSA Millard

## 2025-02-03 SDOH — HEALTH STABILITY: PHYSICAL HEALTH: ON AVERAGE, HOW MANY MINUTES DO YOU ENGAGE IN EXERCISE AT THIS LEVEL?: 40 MIN

## 2025-02-03 SDOH — HEALTH STABILITY: PHYSICAL HEALTH: ON AVERAGE, HOW MANY DAYS PER WEEK DO YOU ENGAGE IN MODERATE TO STRENUOUS EXERCISE (LIKE A BRISK WALK)?: 4 DAYS

## 2025-02-03 ASSESSMENT — SOCIAL DETERMINANTS OF HEALTH (SDOH): HOW OFTEN DO YOU GET TOGETHER WITH FRIENDS OR RELATIVES?: TWICE A WEEK

## 2025-02-05 ENCOUNTER — OFFICE VISIT (OUTPATIENT)
Dept: FAMILY MEDICINE | Facility: CLINIC | Age: 70
End: 2025-02-05
Attending: PHYSICIAN ASSISTANT
Payer: COMMERCIAL

## 2025-02-05 VITALS
BODY MASS INDEX: 26.09 KG/M2 | HEIGHT: 67 IN | TEMPERATURE: 96.8 F | SYSTOLIC BLOOD PRESSURE: 124 MMHG | DIASTOLIC BLOOD PRESSURE: 68 MMHG | HEART RATE: 61 BPM | WEIGHT: 166.2 LBS | OXYGEN SATURATION: 98 %

## 2025-02-05 DIAGNOSIS — I25.10 CORONARY ARTERY DISEASE INVOLVING NATIVE CORONARY ARTERY OF NATIVE HEART WITHOUT ANGINA PECTORIS: Primary | ICD-10-CM

## 2025-02-05 DIAGNOSIS — E78.5 DYSLIPIDEMIA: ICD-10-CM

## 2025-02-05 DIAGNOSIS — Z87.891 HISTORY OF TOBACCO USE, PRESENTING HAZARDS TO HEALTH: ICD-10-CM

## 2025-02-05 DIAGNOSIS — Z00.00 ENCOUNTER FOR MEDICARE ANNUAL WELLNESS EXAM: ICD-10-CM

## 2025-02-05 DIAGNOSIS — C61 PROSTATE CANCER (H): ICD-10-CM

## 2025-02-05 LAB
CHOLEST SERPL-MCNC: 113 MG/DL
FASTING STATUS PATIENT QL REPORTED: YES
HDLC SERPL-MCNC: 47 MG/DL
LDLC SERPL CALC-MCNC: 53 MG/DL
NONHDLC SERPL-MCNC: 66 MG/DL
TRIGL SERPL-MCNC: 67 MG/DL

## 2025-02-05 PROCEDURE — 99214 OFFICE O/P EST MOD 30 MIN: CPT | Mod: 25 | Performed by: PHYSICIAN ASSISTANT

## 2025-02-05 PROCEDURE — 99397 PER PM REEVAL EST PAT 65+ YR: CPT | Performed by: PHYSICIAN ASSISTANT

## 2025-02-05 PROCEDURE — G2211 COMPLEX E/M VISIT ADD ON: HCPCS | Performed by: PHYSICIAN ASSISTANT

## 2025-02-05 PROCEDURE — 36415 COLL VENOUS BLD VENIPUNCTURE: CPT | Performed by: PHYSICIAN ASSISTANT

## 2025-02-05 PROCEDURE — 80061 LIPID PANEL: CPT | Performed by: PHYSICIAN ASSISTANT

## 2025-02-05 RX ORDER — ATORVASTATIN CALCIUM 80 MG/1
80 TABLET, FILM COATED ORAL EVERY EVENING
Qty: 90 TABLET | Refills: 3 | Status: SHIPPED | OUTPATIENT
Start: 2025-02-05

## 2025-02-05 ASSESSMENT — PAIN SCALES - GENERAL: PAINLEVEL_OUTOF10: NO PAIN (0)

## 2025-02-05 NOTE — PATIENT INSTRUCTIONS
Patient Education   Preventive Care Advice   This is general advice given by our system to help you stay healthy. However, your care team may have specific advice just for you. Please talk to your care team about your preventive care needs.  Nutrition  Eat 5 or more servings of fruits and vegetables each day.  Try wheat bread, brown rice and whole grain pasta (instead of white bread, rice, and pasta).  Get enough calcium and vitamin D. Check the label on foods and aim for 100% of the RDA (recommended daily allowance).  Lifestyle  Exercise at least 150 minutes each week  (30 minutes a day, 5 days a week).  Do muscle strengthening activities 2 days a week. These help control your weight and prevent disease.  No smoking.  Wear sunscreen to prevent skin cancer.  Have a dental exam and cleaning every 6 months.  Yearly exams  See your health care team every year to talk about:  Any changes in your health.  Any medicines your care team has prescribed.  Preventive care, family planning, and ways to prevent chronic diseases.  Shots (vaccines)   HPV shots (up to age 26), if you've never had them before.  Hepatitis B shots (up to age 59), if you've never had them before.  COVID-19 shot: Get this shot when it's due.  Flu shot: Get a flu shot every year.  Tetanus shot: Get a tetanus shot every 10 years.  Pneumococcal, hepatitis A, and RSV shots: Ask your care team if you need these based on your risk.  Shingles shot (for age 50 and up)  General health tests  Diabetes screening:  Starting at age 35, Get screened for diabetes at least every 3 years.  If you are younger than age 35, ask your care team if you should be screened for diabetes.  Cholesterol test: At age 39, start having a cholesterol test every 5 years, or more often if advised.  Bone density scan (DEXA): At age 50, ask your care team if you should have this scan for osteoporosis (brittle bones).  Hepatitis C: Get tested at least once in your life.  STIs (sexually  transmitted infections)  Before age 24: Ask your care team if you should be screened for STIs.  After age 24: Get screened for STIs if you're at risk. You are at risk for STIs (including HIV) if:  You are sexually active with more than one person.  You don't use condoms every time.  You or a partner was diagnosed with a sexually transmitted infection.  If you are at risk for HIV, ask about PrEP medicine to prevent HIV.  Get tested for HIV at least once in your life, whether you are at risk for HIV or not.  Cancer screening tests  Cervical cancer screening: If you have a cervix, begin getting regular cervical cancer screening tests starting at age 21.  Breast cancer scan (mammogram): If you've ever had breasts, begin having regular mammograms starting at age 40. This is a scan to check for breast cancer.  Colon cancer screening: It is important to start screening for colon cancer at age 45.  Have a colonoscopy test every 10 years (or more often if you're at risk) Or, ask your provider about stool tests like a FIT test every year or Cologuard test every 3 years.  To learn more about your testing options, visit:   .  For help making a decision, visit:   https://bit.ly/ub18589.  Prostate cancer screening test: If you have a prostate, ask your care team if a prostate cancer screening test (PSA) at age 55 is right for you.  Lung cancer screening: If you are a current or former smoker ages 50 to 80, ask your care team if ongoing lung cancer screenings are right for you.  For informational purposes only. Not to replace the advice of your health care provider. Copyright   2023 [facility]. All rights reserved. Clinically reviewed by the [facility]itions Program. 3D Forms 722008 - REV 01/24.  Learning About Sleeping Well  What does sleeping well mean?     Sleeping well means getting enough sleep to feel good and stay healthy. How much sleep is enough varies among people.  The number of hours you  sleep and how you feel when you wake up are both important. If you do not feel refreshed, you probably need more sleep. Another sign of not getting enough sleep is feeling tired during the day.  Experts recommend that adults get at least 7 or more hours of sleep per day. Children and older adults need more sleep.  Why is getting enough sleep important?  Getting enough quality sleep is a basic part of good health. When your sleep suffers, your physical health, mood, and your thoughts can suffer too. You may find yourself feeling more grumpy or stressed. Not getting enough sleep also can lead to serious problems, including injury, accidents, anxiety, and depression.  What might cause poor sleeping?  Many things can cause sleep problems, including:  Changes to your sleep schedule.  Stress. Stress can be caused by fear about a single event, such as giving a speech. Or you may have ongoing stress, such as worry about work or school.  Depression, anxiety, and other mental or emotional conditions.  Changes in your sleep habits or surroundings. This includes changes that happen where you sleep, such as noise, light, or sleeping in a different bed. It also includes changes in your sleep pattern, such as having jet lag or working a late shift.  Health problems, such as pain, breathing problems, and restless legs syndrome.  Lack of regular exercise.  Using alcohol, nicotine, or caffeine before bed.  How can you help yourself?  Here are some tips that may help you sleep more soundly and wake up feeling more refreshed.  Your sleeping area   Use your bedroom only for sleeping and sex. A bit of light reading may help you fall asleep. But if it doesn't, do your reading elsewhere in the house. Try not to use your TV, computer, smartphone, or tablet while you are in bed.  Be sure your bed is big enough to stretch out comfortably, especially if you have a sleep partner.  Keep your bedroom quiet, dark, and cool. Use curtains, blinds,  "or a sleep mask to block out light. To block out noise, use earplugs, soothing music, or a \"white noise\" machine.  Your evening and bedtime routine   Create a relaxing bedtime routine. You might want to take a warm shower or bath, or listen to soothing music.  Go to bed at the same time every night. And get up at the same time every morning, even if you feel tired.  What to avoid   Limit caffeine (coffee, tea, caffeinated sodas) during the day, and don't have any for at least 6 hours before bedtime.  Avoid drinking alcohol before bedtime. Alcohol can cause you to wake up more often during the night.  Try not to smoke or use tobacco, especially in the evening. Nicotine can keep you awake.  Limit naps during the day, especially close to bedtime.  Avoid lying in bed awake for too long. If you can't fall asleep or if you wake up in the middle of the night and can't get back to sleep within about 20 minutes, get out of bed and go to another room until you feel sleepy.  Avoid taking medicine right before bed that may keep you awake or make you feel hyper or energized. Your doctor can tell you if your medicine may do this and if you can take it earlier in the day.  If you can't sleep   Imagine yourself in a peaceful, pleasant scene. Focus on the details and feelings of being in a place that is relaxing.  Get up and do a quiet or boring activity until you feel sleepy.  Avoid drinking any liquids before going to bed to help prevent waking up often to use the bathroom.  Where can you learn more?  Go to https://www.[employer].net/patiented  Enter J942 in the search box to learn more about \"Learning About Sleeping Well.\"  Current as of: July 31, 2024  Content Version: 14.3    2024 [employer].   Care instructions adapted under license by your healthcare professional. If you have questions about a medical condition or this instruction, always ask your healthcare professional. [employer] disclaims any " warranty or liability for your use of this information.

## 2025-02-05 NOTE — PROGRESS NOTES
"Preventive Care Visit  [facility]  Antonio Hernandez PA-C, Family Medicine  Feb 5, 2025      Assessment & Plan   Coronary artery disease involving native coronary artery of native heart without angina pectoris  Dyslipidemia  Stable on current regimen of statin and ASA. He denies any cardiac symptoms today. Continue current regimen and follow-up in 1 year for recheck.   - atorvastatin (LIPITOR) 80 MG tablet; Take 1 tablet (80 mg) by mouth every evening.  - Lipid panel reflex to direct LDL Fasting; Future    Prostate cancer (H)  Follows with Urology. S/p Radiation treatments. On Tamsulosin with active monitoring with PSA and serial CT scans. Continue to assist as needed.     Encounter for Medicare annual wellness exam  70 yr old here for Medicare Wellness exam. Last MWE done 1 year(s) ago. Discussed preventative screenings which are updated below. Counseled on immunizations and healthy lifestyle. Follow-up in 1 year for repeat physical exam.     History of tobacco use, presenting hazards to health  Due for annual screening. Asymptomatic.   - CT Chest Lung Cancer Screen Low Dose Without; Future    The longitudinal plan of care for the diagnosis(es)/condition(s) as documented were addressed during this visit. Due to the added complexity in care, I will continue to support Dillon in the subsequent management and with ongoing continuity of care.    Patient has been advised of split billing requirements and indicates understanding: Yes    BMI  Estimated body mass index is 25.72 kg/m  as calculated from the following:    Height as of this encounter: 1.712 m (5' 7.4\").    Weight as of this encounter: 75.4 kg (166 lb 3.2 oz).     Counseling  Appropriate preventive services were addressed with this patient via screening, questionnaire, or discussion as appropriate for fall prevention, nutrition, physical activity, Tobacco-use cessation, social engagement, weight loss and cognition.  Checklist " reviewing preventive services available has been given to the patient.  Reviewed patient's diet, addressing concerns and/or questions.   Discussed possible causes of fatigue.     Zaria Carranza is a 70 year old, presenting for the following:  Physical and Health Maintenance        2/5/2025     8:06 AM   Additional Questions   Roomed by Yoko Fulton CMA   Accompanied by Self           HPI  Hyperlipidemia Follow-Up  Are you regularly taking any medication or supplement to lower your cholesterol?   Yes- Lipitor   Are you having muscle aches or other side effects that you think could be caused by your cholesterol lowering medication?  No    Health Care Directive  Patient has a Health Care Directive on file  Advance care planning document is on file and is current.      2/3/2025   General Health   How would you rate your overall physical health? Excellent   Feel stress (tense, anxious, or unable to sleep) Only a little   (!) STRESS CONCERN      2/3/2025   Nutrition   Diet: Regular (no restrictions)         2/3/2025   Exercise   Days per week of moderate/strenous exercise 4 days   Average minutes spent exercising at this level 40 min         2/3/2025   Social Factors   Frequency of gathering with friends or relatives Twice a week   Worry food won't last until get money to buy more No   Food not last or not have enough money for food? No   Do you have housing? (Housing is defined as stable permanent housing and does not include staying ouside in a car, in a tent, in an abandoned building, in an overnight shelter, or couch-surfing.) Yes   Are you worried about losing your housing? No   Lack of transportation? No   Unable to get utilities (heat,electricity)? No         2/3/2025   Fall Risk   Fallen 2 or more times in the past year? No   Trouble with walking or balance? No          2/3/2025   Activities of Daily Living- Home Safety   Needs help with the following daily activites None of the above   Safety concerns in the  home None of the above         2/3/2025   Dental   Dentist two times every year? Yes         2/3/2025   Hearing Screening   Hearing concerns? None of the above         2/3/2025   Driving Risk Screening   Patient/family members have concerns about driving No         2/3/2025   General Alertness/Fatigue Screening   Have you been more tired than usual lately? (!) YES         2/3/2025   Urinary Incontinence Screening   Bothered by leaking urine in past 6 months No         2/3/2025   TB Screening   Were you born outside of the [place]? Yes         Today's PHQ-2 Score:       2025     8:00 AM   PHQ-2 (  [employer])   Q1: Little interest or pleasure in doing things 0   Q2: Feeling down, depressed or hopeless 0   PHQ-2 Score 0    Q1: Little interest or pleasure in doing things Not at all   Q2: Feeling down, depressed or hopeless Not at all   PHQ-2 Score 0       Patient-reported           2/3/2025   Substance Use   Alcohol more than 3/day or more than 7/wk Not Applicable   Do you have a current opioid prescription? No   How severe/bad is pain from 1 to 10? 0/10 (No Pain)   Do you use any other substances recreationally? No     Social History     Tobacco Use    Smoking status: Former     Current packs/day: 0.00     Average packs/day: 1 pack/day for 45.1 years (45.1 ttl pk-yrs)     Types: Cigarettes     Start date: 1970     Quit date: 7/15/2015     Years since quittin.5     Passive exposure: Past    Smokeless tobacco: Never   Vaping Use    Vaping status: Never Used   Substance Use Topics    Alcohol use: Not Currently    Drug use: Not Currently       ASCVD Risk   The ASCVD Risk score ([place] DK, et al., 2019) failed to calculate for the following reasons:    The valid total cholesterol range is 130 to 320 mg/dL          Reviewed and updated as needed this visit by Provider                    Current providers sharing in care for this patient include:  Patient Care Team:  Antonio Hernandez PA-C as PCP - General  "(Physician Assistant)  Antonio Hernandez PA-C as Assigned PCP  Tania Garrett PA-C as Physician Assistant (Urology)  Agustina Nunez MD as MD (Cardiovascular Disease)  Agustina Nunez MD as Assigned Heart and Vascular Provider  Jaren Ohara MD as Assigned Cancer Care Provider  Herrera Paul MD as MD (Dermatology)  Ashley Palacios PA-C as Assigned Musculoskeletal Provider  Bryan Alex MD as Assigned Surgical Provider    The following health maintenance items are reviewed in Epic and correct as of today:  Health Maintenance   Topic Date Due    LIPID  01/29/2025    LUNG CANCER SCREENING  02/12/2025    COVID-19 Vaccine (7 - 2024-25 season) 04/14/2025    MEDICARE ANNUAL WELLNESS VISIT  02/05/2026    ANNUAL REVIEW OF HM ORDERS  02/05/2026    FALL RISK ASSESSMENT  02/05/2026    GLUCOSE  12/02/2027    COLORECTAL CANCER SCREENING  01/09/2030    ADVANCE CARE PLANNING  02/05/2030    DTAP/TDAP/TD IMMUNIZATION (5 - Td or Tdap) 02/08/2034    HEPATITIS C SCREENING  Completed    PHQ-2 (once per calendar year)  Completed    INFLUENZA VACCINE  Completed    Pneumococcal Vaccine: 50+ Years  Completed    ZOSTER IMMUNIZATION  Completed    RSV VACCINE  Completed    AORTIC ANEURYSM SCREENING (SYSTEM ASSIGNED)  Completed    HPV IMMUNIZATION  Aged Out    MENINGITIS IMMUNIZATION  Aged Out     Review of Systems  See HPI      Objective    Exam  /68 (BP Location: Right arm, Patient Position: Sitting, Cuff Size: Adult Regular)   Pulse 61   Temp 96.8  F (36  C) (Tympanic)   Ht 1.712 m (5' 7.4\")   Wt 75.4 kg (166 lb 3.2 oz)   SpO2 98%   BMI 25.72 kg/m     Estimated body mass index is 25.72 kg/m  as calculated from the following:    Height as of this encounter: 1.712 m (5' 7.4\").    Weight as of this encounter: 75.4 kg (166 lb 3.2 oz).    Physical Exam  GENERAL: alert and no distress  NECK: no adenopathy, no asymmetry, masses, or scars  RESP: lungs clear to auscultation - no rales, rhonchi or " wheezes  CV: regular rate and rhythm, normal S1 S2, no S3 or S4, no murmur, click or rub, no peripheral edema  ABDOMEN: soft, nontender, no hepatosplenomegaly, no masses and bowel sounds normal  MS: no gross musculoskeletal defects noted, no edema         2/5/2025   Mini Cog   Clock Draw Score 2 Normal   3 Item Recall 3 objects recalled   Mini Cog Total Score 5              Signed Electronically by: Antonio Hernandez PA-C

## 2025-02-06 ENCOUNTER — DOCUMENTATION ONLY (OUTPATIENT)
Dept: OTHER | Facility: CLINIC | Age: 70
End: 2025-02-06
Payer: COMMERCIAL

## 2025-04-10 ENCOUNTER — DOCUMENTATION ONLY (OUTPATIENT)
Dept: OTHER | Facility: CLINIC | Age: 70
End: 2025-04-10
Payer: COMMERCIAL

## 2025-05-27 ENCOUNTER — RESULTS FOLLOW-UP (OUTPATIENT)
Dept: CARDIOLOGY | Facility: CLINIC | Age: 70
End: 2025-05-27

## 2025-05-27 ENCOUNTER — LAB (OUTPATIENT)
Dept: LAB | Facility: CLINIC | Age: 70
End: 2025-05-27
Payer: COMMERCIAL

## 2025-05-27 DIAGNOSIS — I25.10 CORONARY ARTERY DISEASE INVOLVING NATIVE CORONARY ARTERY OF NATIVE HEART WITHOUT ANGINA PECTORIS: ICD-10-CM

## 2025-05-27 DIAGNOSIS — C61 PROSTATE CANCER (H): ICD-10-CM

## 2025-05-27 LAB
ALT SERPL W P-5'-P-CCNC: 23 U/L (ref 0–70)
CHOLEST SERPL-MCNC: 123 MG/DL
FASTING STATUS PATIENT QL REPORTED: YES
HDLC SERPL-MCNC: 48 MG/DL
LDLC SERPL CALC-MCNC: 60 MG/DL
NONHDLC SERPL-MCNC: 75 MG/DL
PSA SERPL DL<=0.01 NG/ML-MCNC: 1.72 NG/ML (ref 0–6.5)
TRIGL SERPL-MCNC: 76 MG/DL

## 2025-05-27 PROCEDURE — 36415 COLL VENOUS BLD VENIPUNCTURE: CPT

## 2025-05-27 PROCEDURE — 84153 ASSAY OF PSA TOTAL: CPT

## 2025-05-27 PROCEDURE — 84460 ALANINE AMINO (ALT) (SGPT): CPT

## 2025-05-27 PROCEDURE — 80061 LIPID PANEL: CPT

## 2025-07-02 ENCOUNTER — OFFICE VISIT (OUTPATIENT)
Dept: DERMATOLOGY | Facility: CLINIC | Age: 70
End: 2025-07-02
Payer: COMMERCIAL

## 2025-07-02 DIAGNOSIS — L81.4 LENTIGO: ICD-10-CM

## 2025-07-02 DIAGNOSIS — D23.9 DERMAL NEVUS: Primary | ICD-10-CM

## 2025-07-02 DIAGNOSIS — D18.01 ANGIOMA OF SKIN: ICD-10-CM

## 2025-07-02 DIAGNOSIS — L82.1 SEBORRHEIC KERATOSES: ICD-10-CM

## 2025-07-02 DIAGNOSIS — Z85.828 HISTORY OF SKIN CANCER: ICD-10-CM

## 2025-07-02 PROCEDURE — 99213 OFFICE O/P EST LOW 20 MIN: CPT | Performed by: DERMATOLOGY

## 2025-07-02 NOTE — LETTER
2025      Dillon Matias  [address] [facility] [address]      Dear Colleague,    Thank you for referring your patient, Dillon Matias, to the [facility]. Please see a copy of my visit note below.    Dillon Matias is an extremely pleasant 70 year old year old male patient here today for hx of non-melanoma skin cancer.  Patient has no other skin complaints today.  Remainder of the HPI, Meds, PMH, Allergies, FH, and SH was reviewed in chart.      Past Medical History:   Diagnosis Date     Actinic keratosis      Basal cell carcinoma      Heart disease 2015       Past Surgical History:   Procedure Laterality Date     COLONOSCOPY       COLONOSCOPY N/A 2025    Procedure: COLONOSCOPY, FLEXIBLE, WITH LESION REMOVAL USING SNARE;  Surgeon: Karan Fisher MD;  Location: [facility]     DAVINCI XI HERNIORRHAPHY INGUINAL Right 2024    Procedure: RIGHT HERNIORRHAPHY, INGUINAL, ROBOT-ASSISTED, LAPAROSCOPIC, USING DA ANNA XI;  Surgeon: Bryan Alex MD;  Location: [facility]     INSERT SEED MARKER / MATRIX N/A 2023    Procedure: Transrectal Ultrasound Guided Placement of Fiducials and SpaceOar;  Surgeon: Michael Almendarez MD;  Location:  OR     ORTHOPEDIC SURGERY  2010    RIGHT KNEE        Family History   Problem Relation Age of Onset     Anesthesia Reaction No family hx of      Venous thrombosis No family hx of        Social History     Socioeconomic History     Marital status:      Spouse name: Not on file     Number of children: Not on file     Years of education: Not on file     Highest education level: Not on file   Occupational History     Not on file   Tobacco Use     Smoking status: Former     Current packs/day: 0.00     Average packs/day: 1 pack/day for 45.1 years (45.1 ttl pk-yrs)     Types: Cigarettes     Start date: 1970     Quit date: 7/15/2015     Years since quittin.9     Passive exposure: Past     Smokeless tobacco: Never   Vaping Use      Vaping status: Never Used   Substance and Sexual Activity     Alcohol use: Not Currently     Drug use: Not Currently     Sexual activity: Not Currently     Partners: Female     Birth control/protection: Post-menopausal   Other Topics Concern     Not on file   Social History Narrative     Not on file     Social Drivers of Health     Financial Resource Strain: Low Risk  (2/3/2025)    Financial Resource Strain      Within the past 12 months, have you or your family members you live with been unable to get utilities (heat, electricity) when it was really needed?: No   Food Insecurity: Low Risk  (2/3/2025)    Food Insecurity      Within the past 12 months, did you worry that your food would run out before you got money to buy more?: No      Within the past 12 months, did the food you bought just not last and you didn t have money to get more?: No   Transportation Needs: Low Risk  (2/3/2025)    Transportation Needs      Within the past 12 months, has lack of transportation kept you from medical appointments, getting your medicines, non-medical meetings or appointments, work, or from getting things that you need?: No   Physical Activity: Sufficiently Active (2/3/2025)    Exercise Vital Sign      Days of Exercise per Week: 4 days      Minutes of Exercise per Session: 40 min   Stress: No Stress Concern Present (2/3/2025)    [employer] - Occupational Stress Questionnaire      Feeling of Stress : Only a little   Social Connections: Unknown (2/3/2025)    Social Connection and Isolation Panel [NHANES]      Frequency of Communication with Friends and Family: Not on file      Frequency of Social Gatherings with Friends and Family: Twice a week      Attends Sikhism Services: Not on file      Active Member of Clubs or Organizations: Not on file      Attends Club or Organization Meetings: Not on file      Marital Status: Not on file   Interpersonal Safety: Low Risk  (2/5/2025)    Interpersonal Safety       Do you feel physically and emotionally safe where you currently live?: Yes      Within the past 12 months, have you been hit, slapped, kicked or otherwise physically hurt by someone?: No      Within the past 12 months, have you been humiliated or emotionally abused in other ways by your partner or ex-partner?: No   Housing Stability: Low Risk  (2/3/2025)    Housing Stability      Do you have housing? : Yes      Are you worried about losing your housing?: No       Outpatient Encounter Medications as of 7/2/2025   Medication Sig Dispense Refill     acetaminophen (TYLENOL) 325 MG tablet Take 2 tablets (650 mg) by mouth every 4 hours as needed for mild pain 50 tablet 0     aspirin (ASA) 81 MG chewable tablet Take 1 tablet (81 mg) by mouth daily 90 tablet 3     Multiple Vitamin (MULTIVITAMIN ADULT PO) Take 1 tablet by mouth every morning       rosuvastatin (CRESTOR) 40 MG tablet Take 1 tablet (40 mg) by mouth daily. 30 tablet 11     sildenafil (VIAGRA) 100 MG tablet Take 1 tablet (100 mg) by mouth daily as needed (take one tablet orally 30 minutes prior to initiating sexual intercourse.). 30 tablet 0     triamcinolone (KENALOG) 0.1 % external cream Apply to affected area on the leg twice a day for 1-2 weeks, then as needed only 454 g 2     No facility-administered encounter medications on file as of 7/2/2025.             O:   NAD, WDWN, Alert & Oriented, Mood & Affect wnl, Vitals stable   General appearance normal   Vitals stable   Alert, oriented and in no acute distress        Stuck on papules and brown macules on trunk and ext   Red papules on trunk  Flesh colored papules on trunk     The remainder of the full exam was normal; the following areas were examined:  conjunctiva/lids, , neck, peripheral vascular system, abdomen, lymph nodes, digits/nails, eccrine and apocrine glands, scalp/hair, face, neck, chest, abdomen, buttocks, back, RUE, LUE, RLE, LLE       Eyes: Conjunctivae/lids:Normal     ENT: Lips, mucosa:  normal    MSK:Normal    Cardiovascular: peripheral edema none    Pulm: Breathing Normal    Lymph Nodes: No Head and Neck Lymphadenopathy     Neuro/Psych: Orientation:Alert and Orientedx3 ; Mood/Affect:normal       A/P:  1. Seborrheic keratosis, lentigo, angioma, dermal nevus, hx of non-melanoma skin cancer   It was a pleasure speaking to Dillon Matias today.  Previous clinic notes and pertinent laboratory tests were reviewed prior to Dillon Matias's visit.  Signs and Symptoms of skin cancer discussed with patient.  Patient encouraged to perform monthly skin exams.  UV precautions reviewed with patient.  Risks of non-melanoma skin cancer discussed with patient   Return to clinic 12 months      Again, thank you for allowing me to participate in the care of your patient.        Sincerely,        Herrera Paul MD    Electronically signed

## 2025-07-02 NOTE — PROGRESS NOTES
Dillon Matias is an extremely pleasant 70 year old year old male patient here today for hx of non-melanoma skin cancer.  Patient has no other skin complaints today.  Remainder of the HPI, Meds, PMH, Allergies, FH, and SH was reviewed in chart.      Past Medical History:   Diagnosis Date    Actinic keratosis     Basal cell carcinoma     Heart disease 2015       Past Surgical History:   Procedure Laterality Date    COLONOSCOPY      COLONOSCOPY N/A 2025    Procedure: COLONOSCOPY, FLEXIBLE, WITH LESION REMOVAL USING SNARE;  Surgeon: Karan Fisher MD;  Location: [facility]    DAVINCI XI HERNIORRHAPHY INGUINAL Right 2024    Procedure: RIGHT HERNIORRHAPHY, INGUINAL, ROBOT-ASSISTED, LAPAROSCOPIC, USING DA ANNA XI;  Surgeon: Bryan Alex MD;  Location: [facility]    INSERT SEED MARKER / MATRIX N/A 2023    Procedure: Transrectal Ultrasound Guided Placement of Fiducials and SpaceOar;  Surgeon: Michael Almendarez MD;  Location:  OR    ORTHOPEDIC SURGERY  2010    RIGHT KNEE        Family History   Problem Relation Age of Onset    Anesthesia Reaction No family hx of     Venous thrombosis No family hx of        Social History     Socioeconomic History    Marital status:      Spouse name: Not on file    Number of children: Not on file    Years of education: Not on file    Highest education level: Not on file   Occupational History    Not on file   Tobacco Use    Smoking status: Former     Current packs/day: 0.00     Average packs/day: 1 pack/day for 45.1 years (45.1 ttl pk-yrs)     Types: Cigarettes     Start date: 1970     Quit date: 7/15/2015     Years since quittin.9     Passive exposure: Past    Smokeless tobacco: Never   Vaping Use    Vaping status: Never Used   Substance and Sexual Activity    Alcohol use: Not Currently    Drug use: Not Currently    Sexual activity: Not Currently     Partners: Female     Birth control/protection: Post-menopausal   Other Topics Concern    Not on file    Social History Narrative    Not on file     Social Drivers of Health     Financial Resource Strain: Low Risk  (2/3/2025)    Financial Resource Strain     Within the past 12 months, have you or your family members you live with been unable to get utilities (heat, electricity) when it was really needed?: No   Food Insecurity: Low Risk  (2/3/2025)    Food Insecurity     Within the past 12 months, did you worry that your food would run out before you got money to buy more?: No     Within the past 12 months, did the food you bought just not last and you didn t have money to get more?: No   Transportation Needs: Low Risk  (2/3/2025)    Transportation Needs     Within the past 12 months, has lack of transportation kept you from medical appointments, getting your medicines, non-medical meetings or appointments, work, or from getting things that you need?: No   Physical Activity: Sufficiently Active (2/3/2025)    Exercise Vital Sign     Days of Exercise per Week: 4 days     Minutes of Exercise per Session: 40 min   Stress: No Stress Concern Present (2/3/2025)    [employer] - Occupational Stress Questionnaire     Feeling of Stress : Only a little   Social Connections: Unknown (2/3/2025)    Social Connection and Isolation Panel [NHANES]     Frequency of Communication with Friends and Family: Not on file     Frequency of Social Gatherings with Friends and Family: Twice a week     Attends Buddhism Services: Not on file     Active Member of Clubs or Organizations: Not on file     Attends Club or Organization Meetings: Not on file     Marital Status: Not on file   Interpersonal Safety: Low Risk  (2/5/2025)    Interpersonal Safety     Do you feel physically and emotionally safe where you currently live?: Yes     Within the past 12 months, have you been hit, slapped, kicked or otherwise physically hurt by someone?: No     Within the past 12 months, have you been humiliated or emotionally abused in  other ways by your partner or ex-partner?: No   Housing Stability: Low Risk  (2/3/2025)    Housing Stability     Do you have housing? : Yes     Are you worried about losing your housing?: No       Outpatient Encounter Medications as of 7/2/2025   Medication Sig Dispense Refill    acetaminophen (TYLENOL) 325 MG tablet Take 2 tablets (650 mg) by mouth every 4 hours as needed for mild pain 50 tablet 0    aspirin (ASA) 81 MG chewable tablet Take 1 tablet (81 mg) by mouth daily 90 tablet 3    Multiple Vitamin (MULTIVITAMIN ADULT PO) Take 1 tablet by mouth every morning      rosuvastatin (CRESTOR) 40 MG tablet Take 1 tablet (40 mg) by mouth daily. 30 tablet 11    sildenafil (VIAGRA) 100 MG tablet Take 1 tablet (100 mg) by mouth daily as needed (take one tablet orally 30 minutes prior to initiating sexual intercourse.). 30 tablet 0    triamcinolone (KENALOG) 0.1 % external cream Apply to affected area on the leg twice a day for 1-2 weeks, then as needed only 454 g 2     No facility-administered encounter medications on file as of 7/2/2025.             O:   NAD, WDWN, Alert & Oriented, Mood & Affect wnl, Vitals stable   General appearance normal   Vitals stable   Alert, oriented and in no acute distress        Stuck on papules and brown macules on trunk and ext   Red papules on trunk  Flesh colored papules on trunk     The remainder of the full exam was normal; the following areas were examined:  conjunctiva/lids, , neck, peripheral vascular system, abdomen, lymph nodes, digits/nails, eccrine and apocrine glands, scalp/hair, face, neck, chest, abdomen, buttocks, back, RUE, LUE, RLE, LLE       Eyes: Conjunctivae/lids:Normal     ENT: Lips, mucosa: normal    MSK:Normal    Cardiovascular: peripheral edema none    Pulm: Breathing Normal    Lymph Nodes: No Head and Neck Lymphadenopathy     Neuro/Psych: Orientation:Alert and Orientedx3 ; Mood/Affect:normal       A/P:  1. Seborrheic keratosis, lentigo, angioma, dermal nevus, hx of  non-melanoma skin cancer   It was a pleasure speaking to Dillon Matias today.  Previous clinic notes and pertinent laboratory tests were reviewed prior to Dillon Matias's visit.  Signs and Symptoms of skin cancer discussed with patient.  Patient encouraged to perform monthly skin exams.  UV precautions reviewed with patient.  Risks of non-melanoma skin cancer discussed with patient   Return to clinic 12 months

## (undated) DEVICE — PREP POVIDONE-IODINE 7.5% SCRUB 4OZ BOTTLE MDS093945

## (undated) DEVICE — GEL ULTRASOUND AQUASONIC 100 .25 L 01-08

## (undated) DEVICE — SYSTEM HYDROGEL SPACEOAR INJEC 10ML SV-2101

## (undated) DEVICE — GOWN LG DISP 9515

## (undated) DEVICE — DECANTER BAG 2002S

## (undated) DEVICE — GLOVE BIOGEL PI MICRO SZ 5.5 48555

## (undated) DEVICE — PREP POVIDONE-IODINE 10% SOLUTION 4OZ BOTTLE MDS093944

## (undated) DEVICE — FILTER LAPROSHIELD SMOKE EVAC LSF1

## (undated) DEVICE — SU MONOCRYL 4-0 PS-2 18" UND Y496G

## (undated) DEVICE — DRAPE IOBAN INCISE 13X13" 6640EZ

## (undated) DEVICE — DAVINCI XI OBTURATOR BLADELESS 8MM 470359

## (undated) DEVICE — DAVINCI XI DRIVER NDL MEGA SUTURECUT 8MM EXT 471309

## (undated) DEVICE — ANTIFOG SOLUTION SEE SHARP 150M TROCAR SWABS 30978

## (undated) DEVICE — GLOVE BIOGEL PI MICRO SZ 7.5 48575

## (undated) DEVICE — PACK SET-UP STD 9102

## (undated) DEVICE — BLADE CLIPPER SGL USE 9680

## (undated) DEVICE — OINTMENT ANTIBIOTIC BACITRACIN ZINC .9 G 1171

## (undated) DEVICE — DRAPE UNDER BUTTOCK 8483

## (undated) DEVICE — ENDO SNARE EXACTO COLD 9MM LOOP 2.4MMX230CM 00711115

## (undated) DEVICE — DAVINCI XI FCP CADIERE 8MM ENDOWRIST 471049

## (undated) DEVICE — DRSG TEGADERM 4X4 3/4" 1626W

## (undated) DEVICE — LINEN TOWEL PACK X5 5464

## (undated) DEVICE — LINEN GOWN X4 5410

## (undated) DEVICE — PAD CHUX UNDERPAD 30X36" P3036C

## (undated) DEVICE — LINEN LEG DRAPE 5457

## (undated) DEVICE — DECANTER VIAL 2006S

## (undated) DEVICE — GLOVE BIOGEL PI MICRO INDICATOR UNDERGLOVE SZ 6.0 48960

## (undated) DEVICE — DRAPE U SPLIT 74X120" 29440

## (undated) DEVICE — Device

## (undated) DEVICE — ENDO TROCAR FIRST ENTRY KII FIOS Z-THRD 05X100MM CTF03

## (undated) DEVICE — COVER EQUIP BAG W/BAND 36X28" LF 01-3628

## (undated) DEVICE — DRAPE TIBURON GENERAL ENDOSCOPY 9458

## (undated) DEVICE — SU WND CLOSURE VLOC 180 ABS 3-0 6" V-20 VLOCL0604

## (undated) DEVICE — RULER SURGICAL PLASTIC STRL LF CS628

## (undated) DEVICE — TRAY PREP DRY SKIN SCRUB 067

## (undated) DEVICE — DAVINCI XI SEAL UNIVERSAL 5-12MM 470500

## (undated) DEVICE — COVER ULTRASOUND PROBE 1X9" LF 25040

## (undated) DEVICE — SU DERMABOND ADVANCED .7ML DNX12

## (undated) DEVICE — PEN MARKING SKIN W/LABELS 31145918

## (undated) DEVICE — SU VICRYL 2-0 SH 27" J317H

## (undated) DEVICE — DRSG KERLIX 4 1/2"X4YDS ROLL 6730

## (undated) DEVICE — SOL NACL 0.9% INJ 250ML BAG 2B1322Q

## (undated) DEVICE — LINEN DRAPE 54X72" 5467

## (undated) DEVICE — SOL WATER IRRIG 1000ML BOTTLE 2F7114

## (undated) DEVICE — DRSG GAUZE 4X8" NON21842

## (undated) DEVICE — GOWN IMPERVIOUS SPECIALTY XLG/XLONG 32474

## (undated) DEVICE — DAVINCI XI DRAPE ARM 470015

## (undated) DEVICE — GOWN XLG DISP 9545

## (undated) DEVICE — SYR 30ML SLIP TIP W/O NDL 302833

## (undated) DEVICE — DRSG TEGADERM 2 3/8X2 3/4" 1624W

## (undated) DEVICE — PREP CHLORAPREP 26ML TINTED ORANGE  260815

## (undated) DEVICE — DAVINCI HOT SHEARS TIP COVER  400180

## (undated) DEVICE — DAVINCI XI DRAPE COLUMN 470341

## (undated) DEVICE — JELLY LUBRICATING SURGILUBE 2OZ TUBE 0281-0205-02

## (undated) DEVICE — SYR 10ML LL W/O NDL 302995

## (undated) DEVICE — KIT PATIENT POSITIONING PIGAZZI LATEX FREE 40580

## (undated) RX ORDER — MEPERIDINE HYDROCHLORIDE 25 MG/ML
INJECTION INTRAMUSCULAR; INTRAVENOUS; SUBCUTANEOUS
Status: DISPENSED
Start: 2024-05-30

## (undated) RX ORDER — FENTANYL CITRATE-0.9 % NACL/PF 10 MCG/ML
PLASTIC BAG, INJECTION (ML) INTRAVENOUS
Status: DISPENSED
Start: 2024-05-30

## (undated) RX ORDER — DEXAMETHASONE SODIUM PHOSPHATE 4 MG/ML
INJECTION, SOLUTION INTRA-ARTICULAR; INTRALESIONAL; INTRAMUSCULAR; INTRAVENOUS; SOFT TISSUE
Status: DISPENSED
Start: 2024-05-30

## (undated) RX ORDER — FENTANYL CITRATE 50 UG/ML
INJECTION, SOLUTION INTRAMUSCULAR; INTRAVENOUS
Status: DISPENSED
Start: 2024-05-30

## (undated) RX ORDER — PROPOFOL 10 MG/ML
INJECTION, EMULSION INTRAVENOUS
Status: DISPENSED
Start: 2024-05-30

## (undated) RX ORDER — GABAPENTIN 100 MG/1
CAPSULE ORAL
Status: DISPENSED
Start: 2024-05-30

## (undated) RX ORDER — GENTAMICIN 40 MG/ML
INJECTION, SOLUTION INTRAMUSCULAR; INTRAVENOUS
Status: DISPENSED
Start: 2023-06-12

## (undated) RX ORDER — LIDOCAINE HYDROCHLORIDE 10 MG/ML
INJECTION, SOLUTION EPIDURAL; INFILTRATION; INTRACAUDAL; PERINEURAL
Status: DISPENSED
Start: 2023-06-12

## (undated) RX ORDER — CEFAZOLIN SODIUM/WATER 2 G/20 ML
SYRINGE (ML) INTRAVENOUS
Status: DISPENSED
Start: 2023-09-08

## (undated) RX ORDER — ONDANSETRON 2 MG/ML
INJECTION INTRAMUSCULAR; INTRAVENOUS
Status: DISPENSED
Start: 2023-09-08

## (undated) RX ORDER — CEFAZOLIN SODIUM/WATER 2 G/20 ML
SYRINGE (ML) INTRAVENOUS
Status: DISPENSED
Start: 2024-05-30

## (undated) RX ORDER — BUPIVACAINE HYDROCHLORIDE 2.5 MG/ML
INJECTION, SOLUTION EPIDURAL; INFILTRATION; INTRACAUDAL
Status: DISPENSED
Start: 2024-05-30

## (undated) RX ORDER — EPHEDRINE SULFATE 50 MG/ML
INJECTION, SOLUTION INTRAMUSCULAR; INTRAVENOUS; SUBCUTANEOUS
Status: DISPENSED
Start: 2024-05-30

## (undated) RX ORDER — ACETAMINOPHEN 325 MG/1
TABLET ORAL
Status: DISPENSED
Start: 2024-05-30

## (undated) RX ORDER — PROPOFOL 10 MG/ML
INJECTION, EMULSION INTRAVENOUS
Status: DISPENSED
Start: 2023-09-08

## (undated) RX ORDER — FENTANYL CITRATE 50 UG/ML
INJECTION, SOLUTION INTRAMUSCULAR; INTRAVENOUS
Status: DISPENSED
Start: 2023-09-08

## (undated) RX ORDER — HEPARIN SODIUM 5000 [USP'U]/.5ML
INJECTION, SOLUTION INTRAVENOUS; SUBCUTANEOUS
Status: DISPENSED
Start: 2024-05-30

## (undated) RX ORDER — CEFAZOLIN SODIUM 1 G/3ML
INJECTION, POWDER, FOR SOLUTION INTRAMUSCULAR; INTRAVENOUS
Status: DISPENSED
Start: 2023-09-08

## (undated) RX ORDER — ONDANSETRON 2 MG/ML
INJECTION INTRAMUSCULAR; INTRAVENOUS
Status: DISPENSED
Start: 2024-05-30

## (undated) RX ORDER — KETOROLAC TROMETHAMINE 30 MG/ML
INJECTION, SOLUTION INTRAMUSCULAR; INTRAVENOUS
Status: DISPENSED
Start: 2024-05-30